# Patient Record
Sex: FEMALE | Race: BLACK OR AFRICAN AMERICAN | NOT HISPANIC OR LATINO | Employment: FULL TIME | URBAN - METROPOLITAN AREA
[De-identification: names, ages, dates, MRNs, and addresses within clinical notes are randomized per-mention and may not be internally consistent; named-entity substitution may affect disease eponyms.]

---

## 2017-07-28 ENCOUNTER — EH NON-PROVIDER (OUTPATIENT)
Dept: OCCUPATIONAL MEDICINE | Facility: CLINIC | Age: 22
End: 2017-07-28

## 2017-07-28 ENCOUNTER — HOSPITAL ENCOUNTER (OUTPATIENT)
Facility: MEDICAL CENTER | Age: 22
End: 2017-07-28
Attending: PREVENTIVE MEDICINE
Payer: COMMERCIAL

## 2017-07-28 ENCOUNTER — EMPLOYEE HEALTH (OUTPATIENT)
Dept: OCCUPATIONAL MEDICINE | Facility: CLINIC | Age: 22
End: 2017-07-28

## 2017-07-28 VITALS
WEIGHT: 127 LBS | BODY MASS INDEX: 22.5 KG/M2 | DIASTOLIC BLOOD PRESSURE: 60 MMHG | SYSTOLIC BLOOD PRESSURE: 100 MMHG | HEIGHT: 63 IN | OXYGEN SATURATION: 90 % | TEMPERATURE: 98.8 F | HEART RATE: 90 BPM

## 2017-07-28 DIAGNOSIS — Z02.1 PRE-EMPLOYMENT HEALTH SCREENING EXAMINATION: ICD-10-CM

## 2017-07-28 DIAGNOSIS — Z02.1 PRE-EMPLOYMENT DRUG SCREENING: ICD-10-CM

## 2017-07-28 LAB
AMP AMPHETAMINE: NORMAL
BAR BARBITURATES: NORMAL
BZO BENZODIAZEPINES: NORMAL
COC COCAINE: NORMAL
INT CON NEG: NORMAL
INT CON POS: NORMAL
MDMA ECSTASY: NORMAL
MET METHAMPHETAMINES: NORMAL
MTD METHADONE: NORMAL
OPI OPIATES: NORMAL
OXY OXYCODONE: NORMAL
PCP PHENCYCLIDINE: NORMAL
POC URINE DRUG SCREEN OCDRS: NORMAL
THC: NORMAL

## 2017-07-28 PROCEDURE — 80305 DRUG TEST PRSMV DIR OPT OBS: CPT | Performed by: PREVENTIVE MEDICINE

## 2017-07-28 PROCEDURE — 8915 PR COMPREHENSIVE PHYSICAL: Performed by: PREVENTIVE MEDICINE

## 2017-07-28 PROCEDURE — 86480 TB TEST CELL IMMUN MEASURE: CPT | Performed by: PREVENTIVE MEDICINE

## 2017-07-28 NOTE — NON-PROVIDER
[employer] New Employee  1. Drug Screen   2. Immunizations  - Quantiferon labs    - Varicella  docs  - MMR  docs  - Hep B  docs  - Tdap  docs   - Flu   Na   3. Mask Fit     na  4. Physical Clearance

## 2017-07-28 NOTE — MR AVS SNAPSHOT
Alicia Meadesey   2017 4:00 PM   Employee Health   MRN: [id number]    Department:  [facility]   Dept Phone:  [phone]    Description:  Female : 1995   Provider:  Mundo Baird M.D.           Reason for Visit     Other physical Procedure Room1      Allergies as of 2017     Not on File      You were diagnosed with     Pre-employment health screening examination   [550988]         Basic Information     Date Of Birth Sex Race Ethnicity Preferred Language    1995 Female Unknown Unknown English      Health Maintenance     Patient has no pending health maintenance at this time      Current Immunizations     No immunizations on file.      Below and/or attached are the medications your provider expects you to take. Review all of your home medications and newly ordered medications with your provider and/or pharmacist. Follow medication instructions as directed by your provider and/or pharmacist. Please keep your medication list with you and share with your provider. Update the information when medications are discontinued, doses are changed, or new medications (including over-the-counter products) are added; and carry medication information at all times in the event of emergency situations     Allergies:  No Known Allergies          Medications  Valid as of: 2017 -  6:18 PM    Generic Name Brand Name Tablet Size Instructions for use    .                 Medicines prescribed today were sent to:     None      Medication refill instructions:       If your prescription bottle indicates you have medication refills left, it is not necessary to call your provider’s office. Please contact your pharmacy and they will refill your medication.    If your prescription bottle indicates you do not have any refills left, you may request refills at any time through one of the following ways: The online OR Productivity system (except Urgent Care), by calling your provider’s office, or by asking  your pharmacy to contact your provider’s office with a refill request. Medication refills are processed only during regular business hours and may not be available until the next business day. Your provider may request additional information or to have a follow-up visit with you prior to refilling your medication.   *Please Note: Medication refills are assigned a new Rx number when refilled electronically. Your pharmacy may indicate that no refills were authorized even though a new prescription for the same medication is available at the pharmacy. Please request the medicine by name with the pharmacy before contacting your provider for a refill.           PWA Access Code: [email or web address]  Expires: 8/27/2017  5:52 PM    PWA  A secure, online tool to manage your health information     [employer]’s PWA® is a secure, online tool that connects you to your personalized health information from the privacy of your home -- day or night - making it very easy for you to manage your healthcare. Once the activation process is completed, you can even access your medical information using the PWA carlos, which is available for free in the Apple Carlos store or Google Play store.     PWA provides the following levels of access (as shown below):   My Chart Features   [facility] Primary [facility] Doctor [facility]  Specialists [facility]  Urgent  Care Non-[facility]  Primary Care  Doctor   Email your healthcare team securely and privately 24/7 X X X    Manage appointments: schedule your next appointment; view details of past/upcoming appointments X      Request prescription refills. X      View recent personal medical records, including lab and immunizations X X X X   View health record, including health history, allergies, medications X X X X   Read reports about your outpatient visits, procedures, consult and ER notes X X X X   See your discharge summary, which is a recap of your hospital and/or ER visit that includes your diagnosis,  lab results, and care plan. X X       How to register for Microtask:  1. Go to  [email or web address].  2. Click on the Sign Up Now box, which takes you to the New Member Sign Up page. You will need to provide the following information:  a. Enter your Microtask Access Code exactly as it appears at the top of this page. (You will not need to use this code after you’ve completed the sign-up process. If you do not sign up before the expiration date, you must request a new code.)   b. Enter your date of birth.   c. Enter your home email address.   d. Click Submit, and follow the next screen’s instructions.  3. Create a Volteat ID. This will be your Volteat login ID and cannot be changed, so think of one that is secure and easy to remember.  4. Create a Volteat password. You can change your password at any time.  5. Enter your Password Reset Question and Answer. This can be used at a later time if you forget your password.   6. Enter your e-mail address. This allows you to receive e-mail notifications when new information is available in Microtask.  7. Click Sign Up. You can now view your health information.    For assistance activating your Microtask account, call [phone]

## 2017-07-28 NOTE — MR AVS SNAPSHOT
"Alicia Tejada   2017 3:20 PM    Non-Provider   MRN: [id number]    Department:  [facility]   Dept Phone:  [phone]    Description:  Female : 1995   Provider:  [facility] NESS DANIEL RPRASANNA           Reason for Visit     Other [facility] new employee ds, blood work, vaccine      Allergies as of 2017     Not on File      You were diagnosed with     Pre-employment drug screening   [259794]       Pre-employment health screening examination   [919425]         Vital Signs     Blood Pressure Pulse Temperature Height Weight Body Mass Index    100/60 mmHg 90 37.1 °C (98.8 °F) 1.6 m (5' 2.99\") 57.607 kg (127 lb) 22.50 kg/m2    Oxygen Saturation                   90%           Basic Information     Date Of Birth Sex Race Ethnicity Preferred Language    1995 Female Unknown Unknown English      Health Maintenance     Patient has no pending health maintenance at this time      Results     POCT 11 Panel Urine Drug Screen      Component    AMPHETAMINE    COCAINE    POC THC    METHAMPHETAMINES    OPIATES    PHENCYCLIDINE    BENZODIAZIPINES    BARBITURATES    METHADONE    MDMA Ecstasy    OXYCODONE    Urine Drug Screen    neg    Internal Control Positive    Valid    Internal Control Negative    Valid                        Current Immunizations     No immunizations on file.      Below and/or attached are the medications your provider expects you to take. Review all of your home medications and newly ordered medications with your provider and/or pharmacist. Follow medication instructions as directed by your provider and/or pharmacist. Please keep your medication list with you and share with your provider. Update the information when medications are discontinued, doses are changed, or new medications (including over-the-counter products) are added; and carry medication information at all times in the event of emergency situations     Allergies:  No Known Allergies          Medications  Valid as of: July " 28, 2017 -  5:52 PM    Generic Name Brand Name Tablet Size Instructions for use    .                 Medicines prescribed today were sent to:     None      Medication refill instructions:       If your prescription bottle indicates you have medication refills left, it is not necessary to call your provider’s office. Please contact your pharmacy and they will refill your medication.    If your prescription bottle indicates you do not have any refills left, you may request refills at any time through one of the following ways: The online [employer] system (except Urgent Care), by calling your provider’s office, or by asking your pharmacy to contact your provider’s office with a refill request. Medication refills are processed only during regular business hours and may not be available until the next business day. Your provider may request additional information or to have a follow-up visit with you prior to refilling your medication.   *Please Note: Medication refills are assigned a new Rx number when refilled electronically. Your pharmacy may indicate that no refills were authorized even though a new prescription for the same medication is available at the pharmacy. Please request the medicine by name with the pharmacy before contacting your provider for a refill.        Your To Do List     Future Labs/Procedures Complete By Expires    QuantiFERON-TB Gold [TB TEST CELL IMM MEASURE AG]  As directed 7/28/2018         [employer] Access Code: [email or web address]  Expires: 8/27/2017  5:52 PM    Skybox Imaging  A secure, online tool to manage your health information     [employer]’s Skybox Imaging® is a secure, online tool that connects you to your personalized health information from the privacy of your home -- day or night - making it very easy for you to manage your healthcare. Once the activation process is completed, you can even access your medical information using the [employer] carlos, which is available for free in the Apple Carlos store or  Google Play store.     Advanced [employer] Technology provides the following levels of access (as shown below):   My Chart Features   [facility] Primary [facility] Doctor [facility]  Specialists [facility]  Urgent  Care Non-[facility]  Primary Care  Doctor   Email your healthcare team securely and privately 24/7 X X X    Manage appointments: schedule your next appointment; view details of past/upcoming appointments X      Request prescription refills. X      View recent personal medical records, including lab and immunizations X X X X   View health record, including health history, allergies, medications X X X X   Read reports about your outpatient visits, procedures, consult and ER notes X X X X   See your discharge summary, which is a recap of your hospital and/or ER visit that includes your diagnosis, lab results, and care plan. X X       How to register for Advanced Cell Technology:  1. Go to  [email or web address].  2. Click on the Sign Up Now box, which takes you to the New Member Sign Up page. You will need to provide the following information:  a. Enter your Advanced Cell Technology Access Code exactly as it appears at the top of this page. (You will not need to use this code after you’ve completed the sign-up process. If you do not sign up before the expiration date, you must request a new code.)   b. Enter your date of birth.   c. Enter your home email address.   d. Click Submit, and follow the next screen’s instructions.  3. Create a Advanced Cell Technology ID. This will be your Advanced Cell Technology login ID and cannot be changed, so think of one that is secure and easy to remember.  4. Create a Advanced Cell Technology password. You can change your password at any time.  5. Enter your Password Reset Question and Answer. This can be used at a later time if you forget your password.   6. Enter your e-mail address. This allows you to receive e-mail notifications when new information is available in Advanced Cell Technology.  7. Click Sign Up. You can now view your health information.    For assistance activating your Advanced [employer] Technology account, call  [phone]

## 2017-07-30 LAB
M TB TUBERC IFN-G BLD QL: NEGATIVE
M TB TUBERC IFN-G/MITOGEN IGNF BLD: 0
M TB TUBERC IGNF/MITOGEN IGNF CONTROL: 69.15 [IU]/ML
MITOGEN IGNF BCKGRD COR BLD-ACNC: 0.01 [IU]/ML

## 2017-10-07 ENCOUNTER — IMMUNIZATION (OUTPATIENT)
Dept: OCCUPATIONAL MEDICINE | Facility: CLINIC | Age: 22
End: 2017-10-07

## 2017-10-07 DIAGNOSIS — Z23 NEED FOR VACCINATION: ICD-10-CM

## 2017-10-07 PROCEDURE — 90686 IIV4 VACC NO PRSV 0.5 ML IM: CPT | Performed by: EMERGENCY MEDICINE

## 2018-02-13 ENCOUNTER — OFFICE VISIT (OUTPATIENT)
Dept: URGENT CARE | Facility: CLINIC | Age: 23
End: 2018-02-13
Payer: COMMERCIAL

## 2018-02-13 VITALS
TEMPERATURE: 99.1 F | OXYGEN SATURATION: 98 % | RESPIRATION RATE: 14 BRPM | BODY MASS INDEX: 20.91 KG/M2 | WEIGHT: 118 LBS | HEART RATE: 93 BPM | DIASTOLIC BLOOD PRESSURE: 84 MMHG | SYSTOLIC BLOOD PRESSURE: 102 MMHG | HEIGHT: 63 IN

## 2018-02-13 DIAGNOSIS — R09.81 SINUS CONGESTION: ICD-10-CM

## 2018-02-13 DIAGNOSIS — R05.9 COUGH: ICD-10-CM

## 2018-02-13 DIAGNOSIS — J02.9 PHARYNGITIS, UNSPECIFIED ETIOLOGY: ICD-10-CM

## 2018-02-13 LAB
INT CON NEG: NORMAL
INT CON POS: NORMAL
S PYO AG THROAT QL: NEGATIVE

## 2018-02-13 PROCEDURE — 99204 OFFICE O/P NEW MOD 45 MIN: CPT | Performed by: PHYSICIAN ASSISTANT

## 2018-02-13 PROCEDURE — 87880 STREP A ASSAY W/OPTIC: CPT | Performed by: PHYSICIAN ASSISTANT

## 2018-02-13 RX ORDER — AMOXICILLIN AND CLAVULANATE POTASSIUM 875; 125 MG/1; MG/1
1 TABLET, FILM COATED ORAL 2 TIMES DAILY
Qty: 20 TAB | Refills: 0 | Status: SHIPPED | OUTPATIENT
Start: 2018-02-13 | End: 2018-08-16

## 2018-02-14 ASSESSMENT — ENCOUNTER SYMPTOMS
SORE THROAT: 1
VOMITING: 0
SPUTUM PRODUCTION: 0
CHILLS: 0
ABDOMINAL PAIN: 0
SINUS PAIN: 0
WHEEZING: 0
COUGH: 1
DIARRHEA: 0
SHORTNESS OF BREATH: 0
NAUSEA: 0
FEVER: 0

## 2018-05-24 ENCOUNTER — OFFICE VISIT (OUTPATIENT)
Dept: URGENT CARE | Facility: CLINIC | Age: 23
End: 2018-05-24
Payer: COMMERCIAL

## 2018-05-24 ENCOUNTER — HOSPITAL ENCOUNTER (OUTPATIENT)
Facility: MEDICAL CENTER | Age: 23
End: 2018-05-24
Attending: PHYSICIAN ASSISTANT
Payer: COMMERCIAL

## 2018-05-24 VITALS
HEART RATE: 80 BPM | DIASTOLIC BLOOD PRESSURE: 72 MMHG | BODY MASS INDEX: 21.26 KG/M2 | WEIGHT: 120 LBS | TEMPERATURE: 98.3 F | RESPIRATION RATE: 16 BRPM | HEIGHT: 63 IN | OXYGEN SATURATION: 95 % | SYSTOLIC BLOOD PRESSURE: 120 MMHG

## 2018-05-24 DIAGNOSIS — N89.8 VAGINAL DISCHARGE: ICD-10-CM

## 2018-05-24 PROCEDURE — 87591 N.GONORRHOEAE DNA AMP PROB: CPT

## 2018-05-24 PROCEDURE — 87491 CHLMYD TRACH DNA AMP PROBE: CPT

## 2018-05-24 PROCEDURE — 87510 GARDNER VAG DNA DIR PROBE: CPT

## 2018-05-24 PROCEDURE — 87660 TRICHOMONAS VAGIN DIR PROBE: CPT

## 2018-05-24 PROCEDURE — 87480 CANDIDA DNA DIR PROBE: CPT

## 2018-05-24 PROCEDURE — 99214 OFFICE O/P EST MOD 30 MIN: CPT | Performed by: PHYSICIAN ASSISTANT

## 2018-05-24 RX ORDER — FLUCONAZOLE 150 MG/1
TABLET ORAL
Qty: 2 TAB | Refills: 0 | Status: SHIPPED | OUTPATIENT
Start: 2018-05-24 | End: 2018-08-16

## 2018-05-24 ASSESSMENT — ENCOUNTER SYMPTOMS
COUGH: 0
SHORTNESS OF BREATH: 0
PALPITATIONS: 0
FLANK PAIN: 0
BACK PAIN: 0
FEVER: 0
CHILLS: 0

## 2018-05-24 NOTE — PROGRESS NOTES
"Subjective:      Alicia Tejada is a 22 y.o. female who presents with Vaginal Discharge (vaginal discharge and itchiness x 4 days)            Vaginal Itching   This is a new problem. The current episode started in the past 7 days. The problem occurs constantly. The problem has been unchanged. Pertinent negatives include no chest pain, chills, coughing or fever. Associated symptoms comments: White discharge  . Nothing aggravates the symptoms. She has tried nothing for the symptoms.       Review of Systems   Constitutional: Negative for chills and fever.   Respiratory: Negative for cough and shortness of breath.    Cardiovascular: Negative for chest pain and palpitations.   Genitourinary: Negative for dysuria, flank pain, frequency, hematuria and urgency.        White discharge and itching from vagina   Musculoskeletal: Negative for back pain.   All other systems reviewed and are negative.    PMH:  has no past medical history on file.  MEDS:   Current Outpatient Prescriptions:   •  fluconazole (DIFLUCAN) 150 MG tablet, Take 1 tablet.  Repeat in 72 hours if symptoms do not resolve., Disp: 2 Tab, Rfl: 0  •  amoxicillin-clavulanate (AUGMENTIN) 875-125 MG Tab, Take 1 Tab by mouth 2 times a day. (Patient not taking: Reported on 5/24/2018), Disp: 20 Tab, Rfl: 0  ALLERGIES: No Known Allergies  SURGHX: History reviewed. No pertinent surgical history.  SOCHX:  reports that she has never smoked. She has never used smokeless tobacco.  FH: Family history was reviewed, no pertinent findings to report  Medications, Allergies, and current problem list reviewed today in Epic       Objective:     /72   Pulse 80   Temp 36.8 °C (98.3 °F)   Resp 16   Ht 1.6 m (5' 2.99\")   Wt 54.4 kg (120 lb)   SpO2 95%   BMI 21.26 kg/m²      Physical Exam   Constitutional: She is oriented to person, place, and time. She appears well-developed and well-nourished.   HENT:   Head: Normocephalic and atraumatic.   Right Ear: External ear normal. "   Left Ear: External ear normal.   Nose: Nose normal.   Mouth/Throat: Oropharynx is clear and moist.   Neck: Normal range of motion. Neck supple.   Cardiovascular: Normal rate, regular rhythm and normal heart sounds.    Pulmonary/Chest: Effort normal and breath sounds normal.   Abdominal: Soft.   Genitourinary: There is no tenderness on the right labia. There is no tenderness on the left labia. No erythema, tenderness or bleeding in the vagina. Vaginal discharge found.   Musculoskeletal: She exhibits no tenderness.   No CVA tenderness present.   Neurological: She is alert and oriented to person, place, and time.   Skin: Skin is warm and dry.   Psychiatric: She has a normal mood and affect. Her behavior is normal. Judgment and thought content normal.   Vitals reviewed.              Assessment/Plan:   Probable yeast infection.  Cultures taken during pelvic exam with MA chaperone.  Further treatment pending labs.    1. Vaginal discharge    - CHLAMYDIA/GC PCR URINE OR SWAB; Future  - VAGINAL PATHOGENS DNA PANEL; Future  - fluconazole (DIFLUCAN) 150 MG tablet; Take 1 tablet.  Repeat in 72 hours if symptoms do not resolve.  Dispense: 2 Tab; Refill: 0    Differential diagnosis, natural history, supportive care discussed. Follow-up with primary care provider within 7-10 days, emergency room precautions discussed.  Patient and/or family appears understanding of information.

## 2018-05-25 LAB
C TRACH DNA SPEC QL NAA+PROBE: NEGATIVE
CANDIDA DNA VAG QL PROBE+SIG AMP: POSITIVE
G VAGINALIS DNA VAG QL PROBE+SIG AMP: NEGATIVE
N GONORRHOEA DNA SPEC QL NAA+PROBE: NEGATIVE
SPECIMEN SOURCE: NORMAL
T VAGINALIS DNA VAG QL PROBE+SIG AMP: NEGATIVE

## 2018-07-26 ENCOUNTER — APPOINTMENT (OUTPATIENT)
Dept: MEDICAL GROUP | Facility: MEDICAL CENTER | Age: 23
End: 2018-07-26
Payer: COMMERCIAL

## 2018-07-30 ENCOUNTER — HOSPITAL ENCOUNTER (OUTPATIENT)
Dept: LAB | Facility: MEDICAL CENTER | Age: 23
End: 2018-07-30
Payer: COMMERCIAL

## 2018-07-30 LAB
BDY FAT % MEASURED: 24.4 %
BP DIAS: 58 MMHG
BP SYS: 107 MMHG
CHOLEST SERPL-MCNC: 147 MG/DL (ref 100–199)
DIABETES HTDIA: NO
EVENT NAME HTEVT: NORMAL
FASTING HTFAS: YES
GLUCOSE SERPL-MCNC: 118 MG/DL (ref 65–99)
HDLC SERPL-MCNC: 44 MG/DL
HYPERTENSION HTHYP: NO
LDLC SERPL CALC-MCNC: 85 MG/DL
SCREENING LOC CITY HTCIT: NORMAL
SCREENING LOC STATE HTSTA: NORMAL
SCREENING LOCATION HTLOC: NORMAL
SMOKING HTSMO: NO
SUBSCRIBER ID HTSID: NORMAL
TRIGL SERPL-MCNC: 90 MG/DL (ref 0–149)

## 2018-07-30 PROCEDURE — 82947 ASSAY GLUCOSE BLOOD QUANT: CPT

## 2018-07-30 PROCEDURE — 36415 COLL VENOUS BLD VENIPUNCTURE: CPT

## 2018-07-30 PROCEDURE — S5190 WELLNESS ASSESSMENT BY NONPH: HCPCS

## 2018-07-30 PROCEDURE — 80061 LIPID PANEL: CPT

## 2018-08-08 ENCOUNTER — APPOINTMENT (OUTPATIENT)
Dept: RADIOLOGY | Facility: IMAGING CENTER | Age: 23
End: 2018-08-08
Attending: PHYSICIAN ASSISTANT
Payer: COMMERCIAL

## 2018-08-08 ENCOUNTER — OFFICE VISIT (OUTPATIENT)
Dept: URGENT CARE | Facility: CLINIC | Age: 23
End: 2018-08-08
Payer: COMMERCIAL

## 2018-08-08 VITALS
HEIGHT: 64 IN | BODY MASS INDEX: 21.45 KG/M2 | WEIGHT: 125.66 LBS | HEART RATE: 78 BPM | DIASTOLIC BLOOD PRESSURE: 50 MMHG | SYSTOLIC BLOOD PRESSURE: 100 MMHG | TEMPERATURE: 98.8 F | OXYGEN SATURATION: 87 % | RESPIRATION RATE: 14 BRPM

## 2018-08-08 DIAGNOSIS — R10.13 EPIGASTRIC PAIN: ICD-10-CM

## 2018-08-08 LAB
APPEARANCE UR: NORMAL
BILIRUB UR STRIP-MCNC: NEGATIVE MG/DL
COLOR UR AUTO: NORMAL
GLUCOSE UR STRIP.AUTO-MCNC: NEGATIVE MG/DL
KETONES UR STRIP.AUTO-MCNC: NEGATIVE MG/DL
LEUKOCYTE ESTERASE UR QL STRIP.AUTO: NEGATIVE
NITRITE UR QL STRIP.AUTO: NEGATIVE
PH UR STRIP.AUTO: 7 [PH] (ref 5–8)
PROT UR QL STRIP: NEGATIVE MG/DL
RBC UR QL AUTO: NORMAL
SP GR UR STRIP.AUTO: 1.01
UROBILINOGEN UR STRIP-MCNC: 4 MG/DL

## 2018-08-08 PROCEDURE — 99214 OFFICE O/P EST MOD 30 MIN: CPT | Performed by: PHYSICIAN ASSISTANT

## 2018-08-08 PROCEDURE — 81002 URINALYSIS NONAUTO W/O SCOPE: CPT | Performed by: PHYSICIAN ASSISTANT

## 2018-08-08 PROCEDURE — 74019 RADEX ABDOMEN 2 VIEWS: CPT | Mod: TC | Performed by: PHYSICIAN ASSISTANT

## 2018-08-08 ASSESSMENT — ENCOUNTER SYMPTOMS
VOMITING: 0
FEVER: 0
CONSTITUTIONAL NEGATIVE: 1
MUSCULOSKELETAL NEGATIVE: 1
ABDOMINAL PAIN: 1
NAUSEA: 0

## 2018-08-09 ENCOUNTER — TELEPHONE (OUTPATIENT)
Dept: URGENT CARE | Facility: CLINIC | Age: 23
End: 2018-08-09

## 2018-08-09 ENCOUNTER — HOSPITAL ENCOUNTER (OUTPATIENT)
Dept: RADIOLOGY | Facility: MEDICAL CENTER | Age: 23
End: 2018-08-09
Attending: PHYSICIAN ASSISTANT
Payer: COMMERCIAL

## 2018-08-09 ENCOUNTER — HOSPITAL ENCOUNTER (OUTPATIENT)
Dept: LAB | Facility: MEDICAL CENTER | Age: 23
End: 2018-08-09
Attending: PHYSICIAN ASSISTANT
Payer: COMMERCIAL

## 2018-08-09 DIAGNOSIS — R10.13 EPIGASTRIC PAIN: ICD-10-CM

## 2018-08-09 DIAGNOSIS — K82.8 PORCELAIN GALLBLADDER: ICD-10-CM

## 2018-08-09 DIAGNOSIS — K80.20 GALLSTONES: ICD-10-CM

## 2018-08-09 LAB
ALBUMIN SERPL BCP-MCNC: 4.7 G/DL (ref 3.2–4.9)
ALBUMIN/GLOB SERPL: 1.7 G/DL
ALP SERPL-CCNC: 45 U/L (ref 30–99)
ALT SERPL-CCNC: 12 U/L (ref 2–50)
ANION GAP SERPL CALC-SCNC: 8 MMOL/L (ref 0–11.9)
ANISOCYTOSIS BLD QL SMEAR: ABNORMAL
AST SERPL-CCNC: 40 U/L (ref 12–45)
BASOPHILS # BLD AUTO: 0.4 % (ref 0–1.8)
BASOPHILS # BLD: 0.04 K/UL (ref 0–0.12)
BILIRUB SERPL-MCNC: 4.2 MG/DL (ref 0.1–1.5)
BUN SERPL-MCNC: 6 MG/DL (ref 8–22)
CALCIUM SERPL-MCNC: 9.3 MG/DL (ref 8.5–10.5)
CHLORIDE SERPL-SCNC: 106 MMOL/L (ref 96–112)
CO2 SERPL-SCNC: 25 MMOL/L (ref 20–33)
COMMENT 1642: NORMAL
CREAT SERPL-MCNC: 0.51 MG/DL (ref 0.5–1.4)
EOSINOPHIL # BLD AUTO: 0.18 K/UL (ref 0–0.51)
EOSINOPHIL NFR BLD: 1.6 % (ref 0–6.9)
ERYTHROCYTE [DISTWIDTH] IN BLOOD BY AUTOMATED COUNT: 83.6 FL (ref 35.9–50)
GLOBULIN SER CALC-MCNC: 2.7 G/DL (ref 1.9–3.5)
GLUCOSE SERPL-MCNC: 82 MG/DL (ref 65–99)
HCT VFR BLD AUTO: 20.1 % (ref 37–47)
HGB BLD-MCNC: 7 G/DL (ref 12–16)
IMM GRANULOCYTES # BLD AUTO: 0.05 K/UL (ref 0–0.11)
IMM GRANULOCYTES NFR BLD AUTO: 0.5 % (ref 0–0.9)
LIPASE SERPL-CCNC: 19 U/L (ref 11–82)
LYMPHOCYTES # BLD AUTO: 2.9 K/UL (ref 1–4.8)
LYMPHOCYTES NFR BLD: 26.6 % (ref 22–41)
MACROCYTES BLD QL SMEAR: ABNORMAL
MCH RBC QN AUTO: 30.8 PG (ref 27–33)
MCHC RBC AUTO-ENTMCNC: 34.8 G/DL (ref 33.6–35)
MCV RBC AUTO: 88.5 FL (ref 81.4–97.8)
MONOCYTES # BLD AUTO: 0.7 K/UL (ref 0–0.85)
MONOCYTES NFR BLD AUTO: 6.4 % (ref 0–13.4)
MORPHOLOGY BLD-IMP: NORMAL
NEUTROPHILS # BLD AUTO: 7.05 K/UL (ref 2–7.15)
NEUTROPHILS NFR BLD: 64.5 % (ref 44–72)
NRBC # BLD AUTO: 0.55 K/UL
NRBC BLD-RTO: 5 /100 WBC
PATH REV: NORMAL
PATH REV: NORMAL
PLATELET # BLD AUTO: 546 K/UL (ref 164–446)
PLATELET BLD QL SMEAR: NORMAL
PMV BLD AUTO: 10.4 FL (ref 9–12.9)
POIKILOCYTOSIS BLD QL SMEAR: NORMAL
POLYCHROMASIA BLD QL SMEAR: NORMAL
POTASSIUM SERPL-SCNC: 4 MMOL/L (ref 3.6–5.5)
PROT SERPL-MCNC: 7.4 G/DL (ref 6–8.2)
RBC # BLD AUTO: 2.27 M/UL (ref 4.2–5.4)
RBC BLD AUTO: PRESENT
SCHISTOCYTES BLD QL SMEAR: NORMAL
SICKLE CELLS BLD QL SMEAR: NORMAL
SODIUM SERPL-SCNC: 139 MMOL/L (ref 135–145)
TARGETS BLD QL SMEAR: NORMAL
WBC # BLD AUTO: 10.9 K/UL (ref 4.8–10.8)

## 2018-08-09 PROCEDURE — 80053 COMPREHEN METABOLIC PANEL: CPT

## 2018-08-09 PROCEDURE — 83690 ASSAY OF LIPASE: CPT

## 2018-08-09 PROCEDURE — 36415 COLL VENOUS BLD VENIPUNCTURE: CPT

## 2018-08-09 PROCEDURE — 80500 HCHG CLINICAL PATH CONSULT-LIMITED: CPT

## 2018-08-09 PROCEDURE — 76705 ECHO EXAM OF ABDOMEN: CPT

## 2018-08-09 PROCEDURE — 85025 COMPLETE CBC W/AUTO DIFF WBC: CPT

## 2018-08-09 NOTE — TELEPHONE ENCOUNTER
Called pt back again after seeing CBC; pt has profound anemia [hx same, hx xfusion...]; told pt to go to ER tonight for more eval/tx; prob has surgical gallbladder, as well. rw

## 2018-08-09 NOTE — TELEPHONE ENCOUNTER
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 rw

## 2018-08-09 NOTE — PROGRESS NOTES
"Subjective:      Alicia Tejada is a 23 y.o. female who presents with Abdominal Pain ([id number], stomach pain, bloated, back pain in morning, unable to sleep well, constipation)            Abdominal Pain   This is a new problem. The current episode started yesterday. The onset quality is undetermined. The problem occurs constantly. The problem has been unchanged. The pain is located in the epigastric region. The pain is moderate. The quality of the pain is aching. The abdominal pain radiates to the back. Pertinent negatives include no dysuria, fever, melena, nausea or vomiting. Nothing aggravates the pain. The pain is relieved by nothing. She has tried nothing for the symptoms. The treatment provided no relief. There is no history of GERD.       Review of Systems   Constitutional: Negative.  Negative for fever.   HENT: Negative.    Gastrointestinal: Positive for abdominal pain. Negative for melena, nausea and vomiting.   Genitourinary: Negative.  Negative for dysuria.   Musculoskeletal: Negative.    Skin: Negative.           Objective:     /50   Pulse 78   Temp 37.1 °C (98.8 °F)   Resp 14   Ht 1.626 m (5' 4\")   Wt 57 kg (125 lb 10.6 oz)   SpO2 (!) 87%   BMI 21.57 kg/m²      Physical Exam   Constitutional: She is oriented to person, place, and time. She appears well-developed and well-nourished. No distress.   HENT:   Head: Normocephalic and atraumatic.   Mouth/Throat: Oropharynx is clear and moist.   Abdominal: Soft. Bowel sounds are normal. She exhibits no distension and no mass. There is tenderness. There is no guarding.   Neurological: She is alert and oriented to person, place, and time.   Skin: Skin is warm and dry. Capillary refill takes less than 2 seconds.   Psychiatric: She has a normal mood and affect. Her behavior is normal.   Nursing note and vitals reviewed.    Active Ambulatory Problems     Diagnosis Date Noted   • No Active Ambulatory Problems     Resolved Ambulatory Problems     Diagnosis " Date Noted   • No Resolved Ambulatory Problems     No Additional Past Medical History     Current Outpatient Prescriptions on File Prior to Visit   Medication Sig Dispense Refill   • fluconazole (DIFLUCAN) 150 MG tablet Take 1 tablet.  Repeat in 72 hours if symptoms do not resolve. 2 Tab 0   • amoxicillin-clavulanate (AUGMENTIN) 875-125 MG Tab Take 1 Tab by mouth 2 times a day. (Patient not taking: Reported on 5/24/2018) 20 Tab 0     No current facility-administered medications on file prior to visit.      Social History     Social History   • Marital status: Unknown     Spouse name: N/A   • Number of children: N/A   • Years of education: N/A     Occupational History   • Not on file.     Social History Main Topics   • Smoking status: Never Smoker   • Smokeless tobacco: Never Used   • Alcohol use Not on file   • Drug use: Unknown   • Sexual activity: Not on file     Other Topics Concern   • Not on file     Social History Narrative   • No narrative on file     History reviewed. No pertinent family history.  Patient has no known allergies.       xr ng  (read/interpret. By me. Rw)         Assessment/Plan:     ·  epig pn; bloating, constip      · Labs; miralax

## 2018-08-15 ENCOUNTER — TELEPHONE (OUTPATIENT)
Dept: MEDICAL GROUP | Facility: PHYSICIAN GROUP | Age: 23
End: 2018-08-15

## 2018-08-15 NOTE — TELEPHONE ENCOUNTER
ESTABLISHED PATIENT PRE-VISIT PLANNING     Note: Patient will not be contacted if there is no indication to call.     1.  Reviewed notes from the last few office visits within the medical group: Yes    2.  If any orders were placed at last visit or intended to be done for this visit (i.e. 6 mos follow-up), do we have Results/Consult Notes?        •  Labs - Labs were not ordered at last office visit.   Note: If patient appointment is for lab review and patient did not complete labs, check with provider if OK to reschedule patient until labs completed.       •  Imaging - Imaging was not ordered at last office visit.       •  Referrals - No referrals were ordered at last office visit.    3. Is this appointment scheduled as a Hospital Follow-Up? No    4.  Immunizations were updated in Epic using WebIZ?: Epic matches WebIZ       •  Web Iz Recommendations: FLU, HPV and TD    5.  Patient is due for the following Health Maintenance Topics:   Health Maintenance Due   Topic Date Due   • IMM MENINGOCOCCAL B VACCINE (1 of 3 - Trumenba 3-Dose Series ) 06/22/2005   • IMM HPV VACCINE (1 of 3 - Female 3-dose series) 06/22/2006   • PAP SMEAR  06/22/2016       - Patient has completed FLU, HEPATITIS A  and HEPATITIS B Immunization(s) per WebIZ. Chart has been updated.    6.  MDX printed for Provider? NO    7.  Patient was NOT informed to arrive 15 min prior to their scheduled appointment and bring in their medication bottles.

## 2018-08-16 ENCOUNTER — OFFICE VISIT (OUTPATIENT)
Dept: MEDICAL GROUP | Facility: PHYSICIAN GROUP | Age: 23
End: 2018-08-16
Payer: COMMERCIAL

## 2018-08-16 VITALS
TEMPERATURE: 98.7 F | RESPIRATION RATE: 16 BRPM | HEIGHT: 64 IN | OXYGEN SATURATION: 97 % | WEIGHT: 120.4 LBS | SYSTOLIC BLOOD PRESSURE: 98 MMHG | HEART RATE: 68 BPM | DIASTOLIC BLOOD PRESSURE: 68 MMHG | BODY MASS INDEX: 20.55 KG/M2

## 2018-08-16 DIAGNOSIS — D57.1 HB-SS DISEASE WITHOUT CRISIS (HCC): ICD-10-CM

## 2018-08-16 DIAGNOSIS — Z30.09 COUNSELING FOR BIRTH CONTROL, ORAL CONTRACEPTIVES: ICD-10-CM

## 2018-08-16 DIAGNOSIS — F41.9 ANXIETY: ICD-10-CM

## 2018-08-16 DIAGNOSIS — K80.20 GALLSTONES: ICD-10-CM

## 2018-08-16 PROBLEM — Z30.011 ENCOUNTER FOR INITIAL PRESCRIPTION OF CONTRACEPTIVE PILLS: Status: ACTIVE | Noted: 2018-08-16

## 2018-08-16 PROCEDURE — 99214 OFFICE O/P EST MOD 30 MIN: CPT | Performed by: PHYSICIAN ASSISTANT

## 2018-08-16 RX ORDER — CITALOPRAM HYDROBROMIDE 10 MG/1
10 TABLET ORAL DAILY
Qty: 30 TAB | Refills: 2 | Status: SHIPPED | OUTPATIENT
Start: 2018-08-16 | End: 2018-09-24

## 2018-08-16 ASSESSMENT — PATIENT HEALTH QUESTIONNAIRE - PHQ9: CLINICAL INTERPRETATION OF PHQ2 SCORE: 0

## 2018-08-16 ASSESSMENT — PAIN SCALES - GENERAL: PAINLEVEL: NO PAIN

## 2018-08-16 NOTE — ASSESSMENT & PLAN NOTE
Chronic but stable problem.  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  She tells me that as a child she took penicillin and folic acid.  States she discontinued medications on her own.  She tells me that she follows up with a hematologist in the [place] area annually.  Patient would like to establish care with hematologist at [facility].  States she works on her diet, exercise and stays hydrated.  She mentions that she experienced a sickle cell crisis in 01/14 and was hospitalized at [facility] in [place] for 1 week.  She tells me that she had a blood transfusion at 6-7 years old.  Asymptomatic at this time.  States mother and father have sickle cell trait.

## 2018-08-16 NOTE — ASSESSMENT & PLAN NOTE
Patient would like to discuss birth control methods.  She tells me that one year ago she stopped taking oral contraceptive Sprintec, but had taken medication for 3 years.  She tells me that she is noncompliant with oral contraceptive.  States she was placed on oral contraceptives for contraceptive reasons and for long menstrual periods.  Denies being sexually active at this time.  States menstrual cycles are regular and menses for 7 days in duration.  Experiences heavy bleeding on days 1-3 in moderate to light bleeding on the remainder days.  She tells me that she experiences moderate to severe cramping and takes over-the-counter ibuprofen or Advil with no alleviation symptoms.  Patient has a positive medical history for sickle cell anemia.  Denies personal or family history of DVT/PEs.  Admits to regular exercise routine.  Denies smoking.

## 2018-08-16 NOTE — PROGRESS NOTES
Chief Complaint   Patient presents with   • Establish Care     anxiety, and pt has gallstones        HISTORY OF PRESENT ILLNESS: Alicia Tejada is an established 23 y.o. female here to discuss the evaluation and management of:    Counseling for birth control, oral contraceptives  Patient would like to discuss birth control methods.  She tells me that one year ago she stopped taking oral contraceptive Sprintec, but had taken medication for 3 years.  She tells me that she is noncompliant with oral contraceptive.  States she was placed on oral contraceptives for contraceptive reasons and for long menstrual periods.  Denies being sexually active at this time.  States menstrual cycles are regular and menses for 7 days in duration.  Experiences heavy bleeding on days 1-3 in moderate to light bleeding on the remainder days.  She tells me that she experiences moderate to severe cramping and takes over-the-counter ibuprofen or Advil with no alleviation symptoms.  Patient has a positive medical history for sickle cell anemia.  Denies personal or family history of DVT/PEs.  Admits to regular exercise routine.  Denies smoking.      Gallstones  Patient was seen at urgent care on 08/08/2018 for abdominal pain, bloating and constipation.  She tells me that she is diagnosed with gallstones.  States she is still experiencing abdominal fullness, bloating and constipation.  She tells me that she followed up with [facility] and was advised to take over-the-counter MiraLAX and digestive advantage for 2 weeks.  If symptoms do not improve that more than likely she will have to proceed with a cholecystectomy.  Denies nausea, vomiting, fever, chills, abdominal pain.      Anxiety  Chronic but worsening problem.  Patient states she has been experiencing anxiety for 4 years.  States anxiety symptoms developed when she started college.  She tells me on average she will experience one panic attack per month.  Panic attack  symptoms include tachycardia, diaphoresis and rapid breathing.  States during panic attacks she uses coping mechanisms.  States symptoms lasted 10-15 minutes in duration.  She mentions that occasionally she has difficulty falling asleep due to experiencing racing thoughts.  States she has wakes up often.  She admits to healthy diet and regular exercise routine.  Denies homicidal or suicidal ideation.      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  She tells me that as a child she took penicillin and folic acid.  States she discontinued medications on her own.  She tells me that she follows up with a hematologist in the [place] area annually.  Patient would like to establish care with hematologist at [facility].  States she works on her diet, exercise and stays hydrated.  She mentions that she experienced a sickle cell crisis in  and was hospitalized at [facility] in [place] for 1 week.  She tells me that she had a blood transfusion at 6-7 years old.  Asymptomatic at this time.  States mother and father have sickle cell trait.      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    Current Outpatient Prescriptions   Medication Sig Dispense Refill   • citalopram (CELEXA) 10 MG tablet Take 1 Tab by mouth every day. 30 Tab 2     No current facility-administered medications for this visit.        Social History   Substance Use Topics   • Smoking status: Never Smoker   • Smokeless tobacco: Never Used   • Alcohol use Yes      Comment: OCC. No-Binge Drinking.        Family Status   Relation Status   • Mo Alive   • Fa Alive   • Bro Alive   • MGMo    • MGFa    • PGMo Alive   • PGFa Alive   • Bro Alive   • Bro Alive  "  • MUnc Alive     Family History   Problem Relation Age of Onset   • No Known Problems Mother    • No Known Problems Father    • No Known Problems Brother    • Diabetes Maternal Grandmother         Type II   • Hypertension Maternal Grandmother    • Diabetes Paternal Grandmother         Type II   • Hypertension Paternal Grandmother    • No Known Problems Brother    • GI Brother         GI Ulcers   • Other Maternal Uncle         Sickle Cell        ROS:  Review of Systems   Constitutional: Negative for fever, chills, weight loss and malaise/fatigue.   HENT: Negative for ear pain, nosebleeds, congestion, sore throat and neck pain.    Eyes: Negative for blurred vision.   Respiratory: Negative for cough, sputum production, shortness of breath and wheezing.    Cardiovascular: Negative for chest pain, palpitations, orthopnea and leg swelling.   Gastrointestinal: Negative for heartburn, nausea, vomiting and abdominal pain.   Genitourinary: Negative for dysuria, urgency and frequency.   Musculoskeletal: Negative for myalgias, back pain and joint pain.   Skin: Negative for rash and itching.   Neurological: Negative for dizziness, tingling, tremors, sensory change, focal weakness and headaches.   Endo/Heme/Allergies: Does not bruise/bleed easily.   Psychiatric/Behavioral: Negative for depression, suicidal ideas and memory loss.  The patient is nervous/anxious and does have insomnia.    All other systems reviewed and are negative except as in HPI.    Exam: Blood pressure (!) 98/68, pulse 68, temperature 37.1 °C (98.7 °F), resp. rate 16, height 1.626 m (5' 4\"), weight 54.6 kg (120 lb 6.4 oz), last menstrual period 08/01/2018, SpO2 97 %, not currently breastfeeding. Body mass index is 20.67 kg/m².  General: Normal appearing. No distress.  HEENT: Normocephalic. Eyes conjunctiva clear lids without ptosis, pupils equal and reactive to light accommodation, ears normal shape and contour, canals are clear bilaterally, tympanic " membranes are benign, nasal mucosa benign, oropharynx is without erythema, edema or exudates.   Neck: Supple without JVD or bruit. Thyroid is not enlarged.  Pulmonary: Clear to ausculation.  Normal effort. No rales, ronchi, or wheezing.  Cardiovascular: Regular rate and rhythm without murmur.   Abdomen: Soft, nontender, nondistended. Normal bowel sounds. Liver and spleen are not palpable  Neurologic: Grossly nonfocal.  Cranial nerves are normal. LE DTR's normal and symmetric.  Lymph: No cervical, supraclavicular or axillary lymph nodes are palpable  Skin: Warm and dry.  No rashes or suspicious skin lesions.  Musculoskeletal: Normal gait. No extremity cyanosis, clubbing, or edema.  Psych: Normal mood and affect. Alert and oriented x3. Judgment and insight is normal.    Medical decision-making and discussion:  1. Counseling for birth control, oral contraceptives  Discussed birth control methods at length.  Discussed risks associated with OCPs including: blood clot, heart attack, and stroke. Advised not to smoke while on hormonal birth control.  Patient is considering Depo-Provera.  At this time does not want to proceed with Depo-Provera injection.  Will discuss in more detail during follow-up appointment in 6 weeks.      2. Gallstones  Continue following up with gastroenterology as indicated.  Continue current medication regimen.  Advised patient to increase fiber intake and continue working on hydration.  Suggested over-the-counter smooth move tea or prune juice.     Advised patient if she develops fever, chills, increased abdominal pain to seek immediate emergency care.    3. Anxiety  Patient is drug naïve.  During today's appointment patient has been prescribed Celexa 10 mg tablet and advised take 1 tablet by mouth every day.  Discussed common side effects and adverse reactions of medication with patient.  Advised patient that she may experience side effects were 2+ weeks but side effect symptoms should subside and  she should start feeling the benefits of the medication around 4-6 weeks.  Advised patient to continue using coping mechanisms.  Discussed with patient that dosage of medication may need to be increased to reach therapeutic range.     Denies any suicidal or homicidal ideation. Emphasized importance of healthy diet and exercise. Discussed that should the patient have any symptoms they should call suicide prevention hotline or report to the emergency room immediately.      - citalopram (CELEXA) 10 MG tablet; Take 1 Tab by mouth every day.  Dispense: 30 Tab; Refill: 2    4. 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  Advised patient to continue work on diet, exercise and hydration.    - REFERRAL TO HEMATOLOGY ONCOLOGY Referral to? [facility]      Please note that this dictation was created using voice recognition software. I have made every reasonable attempt to correct obvious errors, but I expect that there are errors of grammar and possibly content that I did not discover before finalizing the note.      Return in about 6 weeks (around 9/27/2018).

## 2018-08-16 NOTE — ASSESSMENT & PLAN NOTE
Patient was seen at urgent care on 08/08/2018 for abdominal pain, bloating and constipation.  She tells me that she is diagnosed with gallstones.  States she is still experiencing abdominal fullness, bloating and constipation.  She tells me that she followed up with [facility] and was advised to take over-the-counter MiraLAX and digestive advantage for 2 weeks.  If symptoms do not improve that more than likely she will have to proceed with a cholecystectomy.  Denies nausea, vomiting, fever, chills, abdominal pain.

## 2018-08-16 NOTE — ASSESSMENT & PLAN NOTE
Chronic but worsening problem.  Patient states she has been experiencing anxiety for 4 years.  States anxiety symptoms developed when she started college.  She tells me on average she will experience one panic attack per month.  Panic attack symptoms include tachycardia, diaphoresis and rapid breathing.  States during panic attacks she uses coping mechanisms.  States symptoms lasted 10-15 minutes in duration.  She mentions that occasionally she has difficulty falling asleep due to experiencing racing thoughts.  States she has wakes up often.  She admits to healthy diet and regular exercise routine.  Denies homicidal or suicidal ideation.

## 2018-08-21 ENCOUNTER — TELEPHONE (OUTPATIENT)
Dept: HEMATOLOGY ONCOLOGY | Facility: MEDICAL CENTER | Age: 23
End: 2018-08-21

## 2018-08-21 NOTE — TELEPHONE ENCOUNTER
1st attempt to contact the patient.  LM on voicemail for patient requesting a call back to schedule a new patient hematology appointment with Par[facility].  NP/HTH/ Hb-SS Meri w/o crisis/ Marlee Shearer

## 2018-09-13 ENCOUNTER — OFFICE VISIT (OUTPATIENT)
Dept: HEMATOLOGY ONCOLOGY | Facility: MEDICAL CENTER | Age: 23
End: 2018-09-13
Payer: COMMERCIAL

## 2018-09-13 ENCOUNTER — HOSPITAL ENCOUNTER (OUTPATIENT)
Dept: LAB | Facility: MEDICAL CENTER | Age: 23
End: 2018-09-13
Attending: INTERNAL MEDICINE
Payer: COMMERCIAL

## 2018-09-13 VITALS
BODY MASS INDEX: 20.32 KG/M2 | DIASTOLIC BLOOD PRESSURE: 66 MMHG | TEMPERATURE: 98.6 F | WEIGHT: 119.05 LBS | OXYGEN SATURATION: 89 % | RESPIRATION RATE: 16 BRPM | HEIGHT: 64 IN | SYSTOLIC BLOOD PRESSURE: 104 MMHG | HEART RATE: 85 BPM

## 2018-09-13 DIAGNOSIS — R79.89 ABNORMAL BILIRUBIN TEST: ICD-10-CM

## 2018-09-13 DIAGNOSIS — K80.20 GALLSTONES: ICD-10-CM

## 2018-09-13 DIAGNOSIS — D57.1 HB-SS DISEASE WITHOUT CRISIS (HCC): Primary | ICD-10-CM

## 2018-09-13 DIAGNOSIS — R17 TOTAL BILIRUBIN, ELEVATED: ICD-10-CM

## 2018-09-13 DIAGNOSIS — D57.1 HB-SS DISEASE WITHOUT CRISIS (HCC): ICD-10-CM

## 2018-09-13 LAB
ALBUMIN SERPL BCP-MCNC: 4.6 G/DL (ref 3.2–4.9)
ALP SERPL-CCNC: 50 U/L (ref 30–99)
ALT SERPL-CCNC: 15 U/L (ref 2–50)
ANISOCYTOSIS BLD QL SMEAR: ABNORMAL
AST SERPL-CCNC: 51 U/L (ref 12–45)
BASOPHILS # BLD AUTO: 2.5 % (ref 0–1.8)
BASOPHILS # BLD: 0.36 K/UL (ref 0–0.12)
BILIRUB CONJ SERPL-MCNC: 0.6 MG/DL (ref 0.1–0.5)
BILIRUB INDIRECT SERPL-MCNC: 2.9 MG/DL (ref 0–1)
BILIRUB SERPL-MCNC: 3.5 MG/DL (ref 0.1–1.5)
EOSINOPHIL # BLD AUTO: 0.36 K/UL (ref 0–0.51)
EOSINOPHIL NFR BLD: 2.5 % (ref 0–6.9)
ERYTHROCYTE [DISTWIDTH] IN BLOOD BY AUTOMATED COUNT: 76.8 FL (ref 35.9–50)
HCT VFR BLD AUTO: 20.6 % (ref 37–47)
HGB BLD-MCNC: 7.2 G/DL (ref 12–16)
HYPOCHROMIA BLD QL SMEAR: ABNORMAL
LYMPHOCYTES # BLD AUTO: 6.13 K/UL (ref 1–4.8)
LYMPHOCYTES NFR BLD: 42.9 % (ref 22–41)
MACROCYTES BLD QL SMEAR: ABNORMAL
MANUAL DIFF BLD: NORMAL
MCH RBC QN AUTO: 29.9 PG (ref 27–33)
MCHC RBC AUTO-ENTMCNC: 35 G/DL (ref 33.6–35)
MCV RBC AUTO: 85.5 FL (ref 81.4–97.8)
MICROCYTES BLD QL SMEAR: ABNORMAL
MONOCYTES # BLD AUTO: 0.49 K/UL (ref 0–0.85)
MONOCYTES NFR BLD AUTO: 3.4 % (ref 0–13.4)
MORPHOLOGY BLD-IMP: NORMAL
NEUTROPHILS # BLD AUTO: 6.85 K/UL (ref 2–7.15)
NEUTROPHILS NFR BLD: 47.9 % (ref 44–72)
NRBC # BLD AUTO: 0.14 K/UL
NRBC BLD-RTO: 1 /100 WBC
PLATELET # BLD AUTO: 539 K/UL (ref 164–446)
PLATELET BLD QL SMEAR: NORMAL
PMV BLD AUTO: 10.5 FL (ref 9–12.9)
POIKILOCYTOSIS BLD QL SMEAR: NORMAL
POLYCHROMASIA BLD QL SMEAR: NORMAL
PROT SERPL-MCNC: 8.1 G/DL (ref 6–8.2)
RBC # BLD AUTO: 2.41 M/UL (ref 4.2–5.4)
RBC BLD AUTO: PRESENT
SICKLE CELLS BLD QL SMEAR: NORMAL
WBC # BLD AUTO: 14.3 K/UL (ref 4.8–10.8)

## 2018-09-13 PROCEDURE — 85007 BL SMEAR W/DIFF WBC COUNT: CPT

## 2018-09-13 PROCEDURE — 80076 HEPATIC FUNCTION PANEL: CPT

## 2018-09-13 PROCEDURE — 99204 OFFICE O/P NEW MOD 45 MIN: CPT | Mod: Q6 | Performed by: INTERNAL MEDICINE

## 2018-09-13 PROCEDURE — 85027 COMPLETE CBC AUTOMATED: CPT

## 2018-09-13 PROCEDURE — 36415 COLL VENOUS BLD VENIPUNCTURE: CPT

## 2018-09-13 RX ORDER — CETIRIZINE HYDROCHLORIDE 10 MG/1
10 TABLET ORAL
COMMUNITY
End: 2020-10-21

## 2018-09-13 ASSESSMENT — ENCOUNTER SYMPTOMS
DIZZINESS: 0
NERVOUS/ANXIOUS: 0
HEADACHES: 0
HEMOPTYSIS: 0
NAUSEA: 0
FEVER: 0
PALPITATIONS: 0
VOMITING: 0
CONSTITUTIONAL NEGATIVE: 1
DEPRESSION: 0
CHILLS: 0
WHEEZING: 0
SINUS PAIN: 0
COUGH: 0
DOUBLE VISION: 0
WEIGHT LOSS: 0
ABDOMINAL PAIN: 0
MYALGIAS: 0
BLURRED VISION: 0

## 2018-09-13 ASSESSMENT — PAIN SCALES - GENERAL: PAINLEVEL: NO PAIN

## 2018-09-13 NOTE — PROGRESS NOTES
Consult Note: Hematology    Date of consultation: 9/13/2018 7:44 AM    Referring provider: Marlee Shearer P.A.-C.    Reason for consultation: Sickle cell Anemia -- Hgb - SS     History of presenting illness:  23 year old female , diagnosed with sickle cell anemia as an infant, presents today to establish care. She had been doing well , she received only 2 PRBC transfusions at 7 years of age and no need since then. One episode of pneumonia -in early childhood, and no other infections . One episode of sickle cell crisis -1/2014 , without recurrence .Presents with abdominal pain , RUQ pain late July early August 2018 .     US gallbladder: 8/9/18 : FINDINGS: There is no ascites. The liver is normal in contour. There is no evidence of solid mass lesion. The liver measures 18.22 cm. Gallbladder is contracted and demonstrates shadowing most likely due to multiple stones within the gallbladder. The gallbladder wall measures 0.24 cc.  There is no pericholecystic fluid.  The common duct measures 0.34 cm. The visualized pancreas is unremarkable. The visualized aorta is normal in caliber. Intrahepatic IVC is patent. The portal vein is patent with hepatopetal flow measuring 10 mm.  The right kidney measures 10.94 cm.  She is ongoing further GI evaluation at [facility].         Thank you very much for allowing me to see  Alicia Tejada today .     Past Medical History:  History reviewed. No pertinent past medical history.    Past surgical history:    Past Surgical History:   Procedure Laterality Date   • TONSILLECTOMY         Allergies:  No Known Allergies    Medications:    Current Outpatient Prescriptions   Medication Sig Dispense Refill   • cetirizine (ZYRTEC) 10 MG Tab Take 10 mg by mouth every day.     • Psyllium (METAMUCIL FIBER PO) Take  by mouth.     • citalopram (CELEXA) 10 MG tablet Take 1 Tab by mouth every day. 30 Tab 2     No current facility-administered medications for this visit.        Social  History:     Social History     Social History   • Marital status: Single     Spouse name: N/A   • Number of children: N/A   • Years of education: N/A     Occupational History   • Not on file.     Social History Main Topics   • Smoking status: Never Smoker   • Smokeless tobacco: Never Used   • Alcohol use Yes      Comment: OCC. No-Binge Drinking.    • Drug use: No   • Sexual activity: Not Currently     Partners: Male     Birth control/ protection: Condom     Other Topics Concern   • Not on file     Social History Narrative   • No narrative on file       Family History:     Family History   Problem Relation Age of Onset   • No Known Problems Mother    • No Known Problems Father    • No Known Problems Brother    • Diabetes Maternal Grandmother         Type II   • Hypertension Maternal Grandmother    • Diabetes Paternal Grandmother         Type II   • Hypertension Paternal Grandmother    • No Known Problems Brother    • GI Brother         GI Ulcers   • Other Maternal Uncle         Sickle Cell        Review of Systems   Constitutional: Negative.  Negative for chills, fever, malaise/fatigue and weight loss.   HENT: Negative for hearing loss, nosebleeds and sinus pain.    Eyes: Negative for blurred vision and double vision.   Respiratory: Negative for cough, hemoptysis and wheezing.    Cardiovascular: Negative for chest pain, palpitations and leg swelling.   Gastrointestinal: Negative for abdominal pain, nausea and vomiting.        Bloating off/on   Genitourinary: Negative for dysuria.   Musculoskeletal: Negative for joint pain and myalgias.   Skin: Negative for itching and rash.   Neurological: Negative for dizziness and headaches.   Psychiatric/Behavioral: Negative for depression. The patient is not nervous/anxious.        Physical Exam   Constitutional: She is oriented to person, place, and time and well-developed, well-nourished, and in no distress. No distress.   HENT:   Head: Normocephalic.   Mouth/Throat: No  "oropharyngeal exudate.   Eyes: Pupils are equal, round, and reactive to light. EOM are normal.   Neck: Normal range of motion. Neck supple. No tracheal deviation present. No thyromegaly present.   Cardiovascular: Normal rate, regular rhythm and normal heart sounds.    No murmur heard.  Pulmonary/Chest: Effort normal and breath sounds normal. No respiratory distress. She has no wheezes. She has no rales.   Abdominal: Soft. Bowel sounds are normal. She exhibits no distension and no mass. There is no tenderness. There is no rebound.   Musculoskeletal: She exhibits no edema.   Lymphadenopathy:     She has no cervical adenopathy.   Neurological: She is alert and oriented to person, place, and time.   Skin: Skin is warm and dry. No rash noted. She is not diaphoretic. No erythema.   Psychiatric: Mood and affect normal.       Vitals:   /66   Pulse 85   Temp 37 °C (98.6 °F)   Resp 16   Ht 1.626 m (5' 4\")   Wt 54 kg (119 lb 0.8 oz)   LMP 08/23/2018   SpO2 89%   Breastfeeding? No   BMI 20.43 kg/m²     Labs:   No results for input(s): RBC, HEMOGLOBIN, HEMATOCRIT, PLATELETCT, PROTHROMBTM, APTT, INR, IRON, FERRITIN, TOTIRONBC in the last 72 hours.  Lab Results   Component Value Date/Time    SODIUM 139 08/09/2018 08:56 AM    POTASSIUM 4.0 08/09/2018 08:56 AM    CHLORIDE 106 08/09/2018 08:56 AM    CO2 25 08/09/2018 08:56 AM    GLUCOSE 82 08/09/2018 08:56 AM    BUN 6 (L) 08/09/2018 08:56 AM    CREATININE 0.51 08/09/2018 08:56 AM      8/9/18 : 10.9/7.0/ 546,000   T Bili - 4.2. -- reviewed.  Assessment and Plan:    1. Sickle cell disease , Hgb SS , without acute crisis. HGB-7 g/dL - stable her baseline .     2. Gallstones newly diagnosed , abdominal discomfort-- GI evaluation ongoing . Discussed that in the case of Sickle cell disease will jorge to bring Hgb to 10g/dL before GB surgery will be performed . At this time no plans for surgical consult yet , but patient is contemplating surgery. We reviewed the characteristic " increased incidence of gallstones in sickle cell disease.    3. Mild elevated total billirubin noted on labs from 8/9/18 -- will repeat LFT for close monitoring.     4. Repeat for now US gallbladder in 3 months and follow up in 3 months for final plans for when the gallbladder surgery will be completed.     She agreed and verbalized her agreement and understanding with the current plan.  I answered all questions and concerns she has at this time.              Thank you for allowing me to participate in her care.      All labs and/or imaging studies mentioned in the note above were reviewed with and explained to the patient as a pertain to medical decision making.        SIGNATURES:  Beti Walter    CC:  BRAD Hassan, Marlee ADAMS P.A.-C.

## 2018-09-13 NOTE — PATIENT INSTRUCTIONS
1. Blood work today , recheck - CBC and liver function tests. Will call with results if abnormal   2. Consider to repeat US gallbladder in 3 months --  3. If surgery will be needed your hemoglobin needs to be 10, so will transfuse before surgery as per standard of care.   4. See hematology iv 3 months or sooner if surgery scheduled .

## 2018-09-24 ENCOUNTER — OFFICE VISIT (OUTPATIENT)
Dept: MEDICAL GROUP | Facility: PHYSICIAN GROUP | Age: 23
End: 2018-09-24
Payer: COMMERCIAL

## 2018-09-24 VITALS
OXYGEN SATURATION: 93 % | HEART RATE: 87 BPM | BODY MASS INDEX: 20.45 KG/M2 | RESPIRATION RATE: 16 BRPM | TEMPERATURE: 98.2 F | WEIGHT: 119.8 LBS | SYSTOLIC BLOOD PRESSURE: 106 MMHG | DIASTOLIC BLOOD PRESSURE: 62 MMHG | HEIGHT: 64 IN

## 2018-09-24 DIAGNOSIS — K80.20 GALLSTONES: ICD-10-CM

## 2018-09-24 DIAGNOSIS — Z23 NEED FOR VACCINATION: ICD-10-CM

## 2018-09-24 DIAGNOSIS — Z30.013 ENCOUNTER FOR INITIAL PRESCRIPTION OF INJECTABLE CONTRACEPTIVE: ICD-10-CM

## 2018-09-24 DIAGNOSIS — D57.1 HB-SS DISEASE WITHOUT CRISIS (HCC): ICD-10-CM

## 2018-09-24 DIAGNOSIS — F41.9 ANXIETY: ICD-10-CM

## 2018-09-24 LAB
INT CON NEG: NEGATIVE
INT CON POS: POSITIVE
POC URINE PREGNANCY TEST: NEGATIVE

## 2018-09-24 PROCEDURE — 90670 PCV13 VACCINE IM: CPT | Performed by: PHYSICIAN ASSISTANT

## 2018-09-24 PROCEDURE — 81025 URINE PREGNANCY TEST: CPT | Performed by: PHYSICIAN ASSISTANT

## 2018-09-24 PROCEDURE — 99214 OFFICE O/P EST MOD 30 MIN: CPT | Mod: 25 | Performed by: PHYSICIAN ASSISTANT

## 2018-09-24 PROCEDURE — 90471 IMMUNIZATION ADMIN: CPT | Performed by: PHYSICIAN ASSISTANT

## 2018-09-24 RX ORDER — MEDROXYPROGESTERONE ACETATE 150 MG/ML
150 INJECTION, SUSPENSION INTRAMUSCULAR ONCE
Qty: 1 ML | Refills: 0 | OUTPATIENT
Start: 2018-09-24 | End: 2018-09-24 | Stop reason: CLARIF

## 2018-09-24 RX ORDER — CITALOPRAM 20 MG/1
20 TABLET ORAL DAILY
Qty: 30 TAB | Refills: 3 | Status: ON HOLD | OUTPATIENT
Start: 2018-09-24 | End: 2019-01-15

## 2018-09-24 RX ORDER — MEDROXYPROGESTERONE ACETATE 150 MG/ML
150 INJECTION, SUSPENSION INTRAMUSCULAR ONCE
Status: COMPLETED | OUTPATIENT
Start: 2018-09-24 | End: 2018-09-24

## 2018-09-24 RX ADMIN — MEDROXYPROGESTERONE ACETATE 150 MG: 150 INJECTION, SUSPENSION INTRAMUSCULAR at 09:23

## 2018-09-24 NOTE — PROGRESS NOTES
Chief Complaint   Patient presents with   • Medication Management       HISTORY OF PRESENT ILLNESS: Alicia Tejada is an established 23 y.o. female here to discuss the evaluation and management of:    Encounter for initial prescription of injectable contraceptive  Patient would like to proceed with Depo-Provera.  She tells me that she is tried oral contraceptives in the past and was noncompliant with medication.  She feels Depo-Provera would best fit her needs.   She tells me that she is sexually active with one partner and experiences long menstrual periods. States menstrual cycles are regular and menses for 7 days in duration.  Experiences heavy bleeding on days 1-3 and moderate to light bleeding on the remainder days.  + for moderate to severe cramping and takes over-the-counter ibuprofen or Advil with mild alleviation symptoms.  Patient has a positive medical history for sickle cell anemia and is following up with hematology.  Denies personal or family history of DVT/PEs.  Admits to regular exercise routine.  Denies smoking.    Anxiety  Chronic but stable problem.  During her last appointment on 8/16/2018 patient was prescribed Celexa 10 mg tablet once daily.  She tells me she has been compliant with medication and experiences no side effects or complications from medication.  States since initiating medication anxiety symptoms have improved and she is sleeping some better.  States she still occasionally has difficulty falling asleep due to racing thoughts.  States on average she sleeps 6 hours per night.  She mentions that she feels less anxious at work.  Denies panic attacks since initiating medication.  Denies homicidal or suicidal ideation.  States she has a healthy diet and regular exercise routine.    Gallstones  Chronic problem.  She tells me that she is following up with [facility].  States she was advised by her gastroenterologist that cholecystectomy was not warranted at this  time.  Patient is waiting and watching.  States she is still experiencing abdominal bloating and fullness.  States she is undergoing further workup.  Still taking over-the-counter MiraLAX for constipation.  Denies nausea, vomiting, fever, chills, abdominal pain.    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  Since her last appointment on 2018 patient has established with hematology and will be following patient closely.  Asymptomatic at this time.      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    Current Outpatient Prescriptions   Medication Sig Dispense Refill   • citalopram (CELEXA) 20 MG Tab Take 1 Tab by mouth every day. 30 Tab 3   • cetirizine (ZYRTEC) 10 MG Tab Take 10 mg by mouth every day.       No current facility-administered medications for this visit.        Social History   Substance Use Topics   • Smoking status: Never Smoker   • Smokeless tobacco: Never Used   • Alcohol use Yes      Comment: OCC. No-Binge Drinking.        Family Status   Relation Status   • Mo Alive   • Fa Alive   • Bro Alive   • MGMo    • MGFa    • PGMo Alive   • PGFa Alive   • Bro Alive   • Bro Alive   • MUnc Alive     Family History   Problem Relation Age of Onset   • No Known Problems Mother    • No Known Problems Father    • No Known Problems Brother    • Diabetes Maternal Grandmother         Type II   • Hypertension Maternal Grandmother    • Diabetes Paternal Grandmother         Type II   • Hypertension Paternal Grandmother    • No Known Problems Brother    • GI Brother         GI Ulcers   • Other Maternal Uncle   "       Sickle Cell        ROS:  Review of Systems   Constitutional: Negative for fever, chills, weight loss and malaise/fatigue.   HENT: Negative for ear pain, nosebleeds, congestion, sore throat and neck pain.    Eyes: Negative for blurred vision.   Respiratory: Negative for cough, sputum production, shortness of breath and wheezing.    Cardiovascular: Negative for chest pain, palpitations, orthopnea and leg swelling.   Gastrointestinal: Negative for heartburn, nausea, vomiting and abdominal pain.  Positive for abdominal bloating and fullness.  Genitourinary: Negative for dysuria, urgency and frequency.   Musculoskeletal: Negative for myalgias, back pain and joint pain.   Skin: Negative for rash and itching.   Neurological: Negative for dizziness, tingling, tremors, sensory change, focal weakness and headaches.   Endo/Heme/Allergies: Does not bruise/bleed easily.   Psychiatric/Behavioral: Negative for suicidal ideas and memory loss.  The patient is not nervous/anxious and does not have insomnia.  + for anxiety.  All other systems reviewed and are negative except as in HPI.    Exam: Blood pressure 106/62, pulse 87, temperature 36.8 °C (98.2 °F), temperature source Temporal, resp. rate 16, height 1.626 m (5' 4\"), weight 54.3 kg (119 lb 12.8 oz), SpO2 93 %, not currently breastfeeding. Body mass index is 20.56 kg/m².  General: Normal appearing. No distress.  HEENT: Normocephalic. Eyes conjunctiva clear lids without ptosis, pupils equal and reactive to light accommodation, ears normal shape and contour, canals are clear bilaterally, tympanic membranes are benign, nasal mucosa benign, oropharynx is without erythema, edema or exudates.   Neck: Supple without JVD or bruit. Thyroid is not enlarged.  Pulmonary: Clear to ausculation.  Normal effort. No rales, ronchi, or wheezing.  Cardiovascular: Regular rate and rhythm without murmur.   Abdomen: Soft, nontender, nondistended. Normal bowel sounds. Liver and spleen are not " palpable.  Neurologic: Grossly nonfocal.  Cranial nerves are normal.  Lymph: No cervical, supraclavicular or axillary lymph nodes are palpable  Skin: Warm and dry.  No rashes or suspicious skin lesions.  Musculoskeletal: Normal gait. No extremity cyanosis, clubbing, or edema.  Psych: Normal mood and affect. Alert and oriented x3. Judgment and insight is normal.    Medical decision-making and discussion:  1. Anxiety  During today's appointment discontinued Celexa 10 mg tab and increase dosage to 20 mg tab once daily.  Discussed common side effects and adverse reactions of medication with patient.  Advised patient that she may explain side effects for 2+ weeks but side effect symptoms should subside and she will start feeling the benefits of the medication soon after.  Advised patient to continue using coping mechanisms and working on diet and exercise.  Practice good sleep hygiene.  Patient will follow-up in 8 weeks for med eval.    Denies any suicidal or homicidal ideation. Discussed that should the patient have any symptoms they should call suicide prevention hotline or report to the emergency room immediately.    - citalopram (CELEXA) 20 MG Tab; Take 1 Tab by mouth every day.  Dispense: 30 Tab; Refill: 3    2. Encounter for initial prescription of injectable contraceptive  POCT Pregnancy: Negative    Discussed OCPs at length.  Patient will be getting Depo-Provera during today's appointment.  Advised patient she will do be due for next vaccination in 3 months.  Use back-up for up to 2 weeks. Discussed risks associated with OCPs including: blood clot, heart attack, and stroke.  Patient denies smoking.  Advised not to smoke while on hormonal birth control.  Denies personal history or family history of DVT/PEs.    Discussed with patient that most common side effect is breakthrough bleeding.  Advised patient if she develops this symptom to contact me.    - POCT PREGNANCY  - medroxyPROGESTERone (DEPO-PROVERA) injection  150 mg; 1 mL by Intramuscular route Once.      3. Gallstones  Chronic problem.  Advised patient to continue following up with [facility] as indicated.  Continue current medication regimen.  Will continue to monitor.    4. 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  Continue following up with hematology as indicated.  Will continue to monitor.    5. Need for vaccination  A Prevnar vaccination was administered to patient without complication. A VIS form was provided to patient.     - Pneumococcal Conjugate Vaccine 13-Valent      Please note that this dictation was created using voice recognition software. I have made every reasonable attempt to correct obvious errors, but I expect that there are errors of grammar and possibly content that I did not discover before finalizing the note.      Return in about 2 months (around 11/24/2018).

## 2018-09-24 NOTE — ASSESSMENT & PLAN NOTE
Chronic but stable problem.  Since her last appointment on 8/16/2018 patient has established with hematology and will be following patient closely.  Asymptomatic at this time.

## 2018-09-24 NOTE — ASSESSMENT & PLAN NOTE
Patient would like to proceed with Depo-Provera.  She tells me that she is tried oral contraceptives in the past and was noncompliant with medication.  She feels Depo-Provera would best fit her needs.   She tells me that she is sexually active with one partner and experiences long menstrual periods. States menstrual cycles are regular and menses for 7 days in duration.  Experiences heavy bleeding on days 1-3 and moderate to light bleeding on the remainder days.  + for moderate to severe cramping and takes over-the-counter ibuprofen or Advil with mild alleviation symptoms.  Patient has a positive medical history for sickle cell anemia and is following up with hematology.  Denies personal or family history of DVT/PEs.  Admits to regular exercise routine.  Denies smoking.

## 2018-09-24 NOTE — ASSESSMENT & PLAN NOTE
Chronic problem.  She tells me that she is following up with [facility].  States she was advised by her gastroenterologist that cholecystectomy was not warranted at this time.  Patient is waiting and watching.  States she is still experiencing abdominal bloating and fullness.  States she is undergoing further workup.  Still taking over-the-counter MiraLAX for constipation.  Denies nausea, vomiting, fever, chills, abdominal pain.

## 2018-09-24 NOTE — ASSESSMENT & PLAN NOTE
Chronic but stable problem.  During her last appointment on 8/16/2018 patient was prescribed Celexa 10 mg tablet once daily.  She tells me she has been compliant with medication and experiences no side effects or complications from medication.  States since initiating medication anxiety symptoms have improved and she is sleeping some better.  States she still occasionally has difficulty falling asleep due to racing thoughts.  States on average she sleeps 6 hours per night.  She mentions that she feels less anxious at work.  Denies panic attacks since initiating medication.  Denies homicidal or suicidal ideation.  States she has a healthy diet and regular exercise routine.

## 2018-10-05 ENCOUNTER — NON-PROVIDER VISIT (OUTPATIENT)
Dept: MEDICAL GROUP | Facility: MEDICAL CENTER | Age: 23
End: 2018-10-05
Payer: COMMERCIAL

## 2018-10-05 DIAGNOSIS — Z23 NEED FOR VACCINATION: ICD-10-CM

## 2018-10-05 PROCEDURE — 90471 IMMUNIZATION ADMIN: CPT | Performed by: FAMILY MEDICINE

## 2018-10-05 PROCEDURE — 90686 IIV4 VACC NO PRSV 0.5 ML IM: CPT | Performed by: FAMILY MEDICINE

## 2018-10-05 NOTE — NON-PROVIDER
"Alicia Tejada is a 23 y.o. female here for a non-provider visit for:   FLU    Reason for immunization: Annual Flu Vaccine  Immunization records indicate need for vaccine: Yes, confirmed with Epic  Minimum interval has been met for this vaccine: Yes  ABN completed: Not Indicated    Order and dose verified by: [facility]  VIS Dated  8/7/15 was given to patient: Yes  All IAC Questionnaire questions were answered \"No.\"    Patient tolerated injection and no adverse effects were observed or reported: Yes    Pt scheduled for next dose in series: Not Indicated    "

## 2018-10-23 ENCOUNTER — HOSPITAL ENCOUNTER (OUTPATIENT)
Dept: RADIOLOGY | Facility: MEDICAL CENTER | Age: 23
End: 2018-10-23
Attending: INTERNAL MEDICINE
Payer: COMMERCIAL

## 2018-10-23 DIAGNOSIS — K80.20 GALLSTONES: ICD-10-CM

## 2018-10-23 PROCEDURE — 76705 ECHO EXAM OF ABDOMEN: CPT

## 2018-11-01 ENCOUNTER — TELEPHONE (OUTPATIENT)
Dept: HEMATOLOGY ONCOLOGY | Facility: MEDICAL CENTER | Age: 23
End: 2018-11-01

## 2018-11-01 DIAGNOSIS — D57.1 HB-SS DISEASE WITHOUT CRISIS (HCC): ICD-10-CM

## 2018-11-01 DIAGNOSIS — K80.20 GALLSTONES: ICD-10-CM

## 2018-11-01 DIAGNOSIS — R79.89 ABNORMAL BILIRUBIN TEST: ICD-10-CM

## 2018-11-01 NOTE — TELEPHONE ENCOUNTER
I spoke to patient letting her know that we faxed over a referral to [facility] for her and we need to get her an appointment to establish care with Dr. Garcia.    I scheduled patient on 11/29/18 at 8:00 am. Patient stated she will call to rescheduled if she needs to.

## 2018-11-01 NOTE — TELEPHONE ENCOUNTER
----- Message from ROSA ELENA Casanova sent at 11/1/2018  9:23 AM PDT -----  Patient saw Dr. Walter for sickle cell disease.  She needs to establish care with Dr. Garcia.  I have placed referral to general surgery to discuss gallbladder removal, but she should get in with him in the next 2 weeks.  Can you please follow the referral as well to make sure she is seen by surgery?    Thank you

## 2018-11-01 NOTE — TELEPHONE ENCOUNTER
Patient establish care with Dr. Walter, hematologist.  Ultrasound of the gallbladder was completed which does show multiple large and small gallstones as well as gallbladder sludge.  I discussed the results with Dr. Morin, and due to patient's underlying sickle cell disease as well as ultrasound report recommendation for surgical intervention regarding the abnormal findings of the gallbladder.  I spoke to the patient personally this morning and she verbalized understanding.  Patient is in agreement for referral to surgery to proceed with surgical intervention.    I will also have patient scheduled to transfer care to Dr. Garcia, hematologist for continued monitoring regarding her sickle cell disease.  Patient verbalized understanding was appreciative of the phone call.      Us-ruq    Result Date: 10/23/2018  10/23/2018 10:22 AM HISTORY/REASON FOR EXAM:  Calculus Abdominal pain TECHNIQUE/EXAM DESCRIPTION AND NUMBER OF VIEWS:  Real-time sonography of the liver and biliary tree. COMPARISON: 8/9/2018 FINDINGS: The liver is normal in contour. There is no evidence of solid mass lesion. The liver measures 15.85 cm. The gallbladder contains multiple large and small stones with sludge. The gallbladder wall thickness measures 0.24 cm. There is no pericholecystic fluid. The common duct measures 0.43 cm. The visualized pancreas is unremarkable. The visualized aorta is normal in caliber. Intrahepatic IVC is patent. The portal vein is patent with hepatopetal flow. The right kidney measures 10.67 cm. There is no ascites.     Multiple large and small gallstones as well as gallbladder sludge.

## 2018-11-16 ENCOUNTER — TELEPHONE (OUTPATIENT)
Dept: HEMATOLOGY ONCOLOGY | Facility: MEDICAL CENTER | Age: 23
End: 2018-11-16

## 2018-11-16 NOTE — TELEPHONE ENCOUNTER
Patient stated she will see Dr. Greene at [facility] [place] on Monday 11/19/18 at 9:30 am.

## 2018-11-16 NOTE — TELEPHONE ENCOUNTER
----- Message from ROSA ELENA Casanova sent at 11/15/2018  3:03 PM PST -----  Can you check to see if and when the patient is scheduled to see surgery?

## 2018-11-29 ENCOUNTER — OFFICE VISIT (OUTPATIENT)
Dept: HEMATOLOGY ONCOLOGY | Facility: MEDICAL CENTER | Age: 23
End: 2018-11-29
Payer: COMMERCIAL

## 2018-11-29 ENCOUNTER — OFFICE VISIT (OUTPATIENT)
Dept: MEDICAL GROUP | Facility: PHYSICIAN GROUP | Age: 23
End: 2018-11-29
Payer: COMMERCIAL

## 2018-11-29 ENCOUNTER — HOSPITAL ENCOUNTER (OUTPATIENT)
Facility: MEDICAL CENTER | Age: 23
End: 2018-11-29
Attending: PHYSICIAN ASSISTANT
Payer: COMMERCIAL

## 2018-11-29 VITALS
TEMPERATURE: 97.9 F | DIASTOLIC BLOOD PRESSURE: 70 MMHG | SYSTOLIC BLOOD PRESSURE: 102 MMHG | RESPIRATION RATE: 16 BRPM | BODY MASS INDEX: 20.66 KG/M2 | WEIGHT: 121.03 LBS | HEART RATE: 89 BPM | OXYGEN SATURATION: 94 % | HEIGHT: 64 IN

## 2018-11-29 VITALS
TEMPERATURE: 98.3 F | WEIGHT: 121 LBS | HEIGHT: 64 IN | DIASTOLIC BLOOD PRESSURE: 60 MMHG | RESPIRATION RATE: 14 BRPM | OXYGEN SATURATION: 97 % | HEART RATE: 83 BPM | SYSTOLIC BLOOD PRESSURE: 92 MMHG | BODY MASS INDEX: 20.66 KG/M2

## 2018-11-29 DIAGNOSIS — Z12.4 SCREENING FOR CERVICAL CANCER: ICD-10-CM

## 2018-11-29 DIAGNOSIS — Z23 NEED FOR VACCINATION: ICD-10-CM

## 2018-11-29 DIAGNOSIS — Z01.419 WOMEN'S ANNUAL ROUTINE GYNECOLOGICAL EXAMINATION: Primary | ICD-10-CM

## 2018-11-29 DIAGNOSIS — Z01.419 WOMEN'S ANNUAL ROUTINE GYNECOLOGICAL EXAMINATION: ICD-10-CM

## 2018-11-29 DIAGNOSIS — D57.1 HB-SS DISEASE WITHOUT CRISIS (HCC): ICD-10-CM

## 2018-11-29 PROCEDURE — 87591 N.GONORRHOEAE DNA AMP PROB: CPT

## 2018-11-29 PROCEDURE — 99395 PREV VISIT EST AGE 18-39: CPT | Mod: 25 | Performed by: PHYSICIAN ASSISTANT

## 2018-11-29 PROCEDURE — 99214 OFFICE O/P EST MOD 30 MIN: CPT | Performed by: INTERNAL MEDICINE

## 2018-11-29 PROCEDURE — 87491 CHLMYD TRACH DNA AMP PROBE: CPT

## 2018-11-29 PROCEDURE — 90471 IMMUNIZATION ADMIN: CPT | Performed by: PHYSICIAN ASSISTANT

## 2018-11-29 PROCEDURE — 90732 PPSV23 VACC 2 YRS+ SUBQ/IM: CPT | Performed by: PHYSICIAN ASSISTANT

## 2018-11-29 PROCEDURE — 88175 CYTOPATH C/V AUTO FLUID REDO: CPT

## 2018-11-29 ASSESSMENT — PAIN SCALES - GENERAL: PAINLEVEL: NO PAIN

## 2018-11-29 NOTE — PROGRESS NOTES
SUBJECTIVE: 23 y.o. female for annual routine gynecologic exam  Chief Complaint   Patient presents with   • Annual Exam     PAP       Obstetric History     No data available      Last Pap: Never  History   Sexual Activity   • Sexual activity: Not Currently   • Partners: Male   • Birth control/ protection: Condom     Sexual history: previously sexually active but currently abstaining, single partner, heterosexual, on Depo Provera   H/O Abnormal Pap no  She  reports that she has never smoked. She has never used smokeless tobacco.        Allergies: Patient has no known allergies.     ROS:    Reports no menopause symptoms of hot flashes, night sweats, sleep disruption, mood changes.Denies vaginal dryness.   Patient is on Depo-Provera and no longer has a menses.   She does not take OTC analgesics for cramping  No significant bloating/fluid retention, pelvic pain, or dyspareunia. No vaginal discharge   No breast tenderness, mass, nipple discharge, changes in size or contour, or abnormal cyclic discomfort.  No urinary tract symptoms, no incontinence, no polydipsia, polyuria,  No abdominal pain, change in bowel habits, black or bloody stools.    No unusual headaches, no visual changes, menstrual migraines   No prolonged cough. No dyspnea or chest pain on exertion.  No depression, labile mood, anxiety, libido changes, insomnia.  No temperature intolerance.  No new/concerning skin lesions, concerns.     Exercise: no regular exercise program  Preventive Care: Yes, self breast exam every 6 months. Wears sunscreen when outdoors. Wears seatbelt.     Current medicines (including changes today)  Current Outpatient Prescriptions   Medication Sig Dispense Refill   • citalopram (CELEXA) 20 MG Tab Take 1 Tab by mouth every day. 30 Tab 3   • cetirizine (ZYRTEC) 10 MG Tab Take 10 mg by mouth every day.       No current facility-administered medications for this visit.      She  has no past medical history on file.  She  has a past  "surgical history that includes tonsillectomy.     Family History:   Family History   Problem Relation Age of Onset   • No Known Problems Mother    • No Known Problems Father    • No Known Problems Brother    • Diabetes Maternal Grandmother         Type II   • Hypertension Maternal Grandmother    • Diabetes Paternal Grandmother         Type II   • Hypertension Paternal Grandmother    • No Known Problems Brother    • GI Brother         GI Ulcers   • Other Maternal Uncle         Sickle Cell        OBJECTIVE:   BP (!) 92/60   Pulse 83   Temp 36.8 °C (98.3 °F)   Resp 14   Ht 1.626 m (5' 4\")   Wt 54.9 kg (121 lb)   SpO2 97%   BMI 20.77 kg/m²   Body mass index is 20.77 kg/m².    Exam: Blood pressure (!) 92/60, pulse 83, temperature 36.8 °C (98.3 °F), resp. rate 14, height 1.626 m (5' 4\"), weight 54.9 kg (121 lb), SpO2 97 %, not currently breastfeeding.  General: Normal appearing. No distress.  HEENT: Normocephalic. Eyes conjunctiva clear lids without ptosis, pupils equal and reactive to light accommodation, ears normal shape and contour, canals are clear bilaterally, tympanic membranes are benign, nasal mucosa benign, oropharynx is without erythema, edema or exudates.   Neck: Supple without JVD or bruit. Thyroid is not enlarged.  Pulmonary: Clear to ausculation.  Normal effort. No rales, ronchi, or wheezing.  Cardiovascular: Regular rate and rhythm without murmur. Carotid and radial pulses are intact and equal bilaterally.  Abdomen: Soft, nontender, nondistended. Normal bowel sounds. Liver and spleen are not palpable  Neurologic: Grossly nonfocal.  Cranial nerves are normal. DTR's normal and symmetric.  Lymph: No cervical, supraclavicular or axillary lymph nodes are palpable  Skin: Warm and dry.  No rashes or suspicious skin lesions.  Musculoskeletal: Normal gait. No extremity cyanosis, clubbing, or edema.  Psych: Normal mood and affect. Alert and oriented x3. Judgment and insight is normal.     Breast Exam: " Performed with instruction during examination. No axillary lymphadenopathy, no skin changes, no dominant masses. No nipple retraction  Pelvic Exam -  Normal external genitalia with no lesions. Vaginal Mucosa:  normal vaginal mucosa, normal discharge . Cervix with no visible lesions. No cervical motion tenderness. Uterus is normal sized with no masses. No adnexal tenderness or enlargement appreciated. Thin Prep Pap is obtained, vaginal swab is obtained and specimen(s) sent to lab  Rectal: deferred    <ASSESSMENT and PLAN>  1. Women's annual routine gynecological examination  2. Screening for cervical cancer    Thin Prep Pap is obtained, vaginal swab is obtained and specimen(s) sent to lab    - THINPREP RFLX HPV ASCUS W/CTNG; Future    3. Need for vaccination  Pneumovax vaccination was administered to patient without complication. A VIS form was provided to patient.     - Pneumococal Polysaccharide Vaccine 23-Valent =>3YO SQ/IM    Patient has been scheduled to complete [facility] paperwork on 12/6/2018 for upcoming total cystectomy.      Discussed  breast self exam, mammography screening, STD prevention, HIV risk factors and prevention, feminine hygiene, adequate intake of calcium and vitamin D, diet and exercise   Follow-up in 3 years for next Gyn exam and 3 years for next Pap.   Next office visit for recheck of chronic medical conditions is due in  1 year

## 2018-11-29 NOTE — PROGRESS NOTES
Date of visit: 11/29/2018  8:17 AM      Chief Complaint- Sickle cell Anemia -- Hgb - SS      History of presenting illness:  23 year old female , diagnosed with sickle cell anemia as an infant, presents today to [facility] care. She had been doing well , she received only 2 PRBC transfusions at 7 years of age and no need since then. One episode of pneumonia -in early childhood, and no other infections . One episode of sickle cell crisis -1/2014 , without recurrence .Presents with abdominal pain , RUQ pain late July early August 2018 .      US gallbladder: 8/9/18 : FINDINGS: There is no ascites. The liver is normal in contour. There is no evidence of solid mass lesion. The liver measures 18.22 cm. Gallbladder is contracted and demonstrates shadowing most likely due to multiple stones within the gallbladder. The gallbladder wall measures 0.24 cc.  There is no pericholecystic fluid.  The common duct measures 0.34 cm. The visualized pancreas is unremarkable. The visualized aorta is normal in caliber. Intrahepatic IVC is patent. The portal vein is patent with hepatopetal flow measuring 10 mm.     Seen by surgery.  Cholecystectomy scheduled for January.  Surgery wants hemoglobin to be around 10 notable.    Past Medical History:    No past medical history on file.    Past surgical history:       Past Surgical History:   Procedure Laterality Date   • TONSILLECTOMY         Allergies:       Patient has no known allergies.    Medications:         Current Outpatient Prescriptions   Medication Sig Dispense Refill   • citalopram (CELEXA) 20 MG Tab Take 1 Tab by mouth every day. 30 Tab 3   • cetirizine (ZYRTEC) 10 MG Tab Take 10 mg by mouth every day.       No current facility-administered medications for this visit.          Social History:     Social History     Social History   • Marital status: Single     Spouse name: N/A   • Number of children: N/A   • Years of education: N/A     Occupational History   • Not on file.  "    Social History Main Topics   • Smoking status: Never Smoker   • Smokeless tobacco: Never Used   • Alcohol use Yes      Comment: OCC. No-Binge Drinking.    • Drug use: No   • Sexual activity: Not Currently     Partners: Male     Birth control/ protection: Condom      Comment: In a committed relationship.     Other Topics Concern   • Not on file     Social History Narrative   • No narrative on file       Family History:      Family History   Problem Relation Age of Onset   • No Known Problems Mother    • No Known Problems Father    • No Known Problems Brother    • Diabetes Maternal Grandmother         Type II   • Hypertension Maternal Grandmother    • Diabetes Paternal Grandmother         Type II   • Hypertension Paternal Grandmother    • No Known Problems Brother    • GI Brother         GI Ulcers   • Other Maternal Uncle         Sickle Cell        Review of Systems:  All other review of systems are negative except what was mentioned above in the HPI.    Constitutional: Negative for fever, chills, weight loss and malaise/fatigue.    HEENT: No new auditory or visual complaints. No sore throat and neck pain.     Respiratory: Negative for cough, sputum production, shortness of breath and wheezing.    Cardiovascular: Negative for chest pain, palpitations, orthopnea and leg swelling.    Gastrointestinal: Negative for heartburn, nausea, vomiting and abdominal pain.    Genitourinary: Negative for dysuria, hematuria    Musculoskeletal: No new arthralgias or myalgias   Skin: Negative for rash and itching.    Neurological: Negative for focal weakness and headaches.    Endo/Heme/Allergies: No abnormal bleed/bruise.    Psychiatric/Behavioral: No new depression/anxiety.    Physical Exam:  Vitals: /70 (BP Location: Right arm, Patient Position: Sitting, BP Cuff Size: Small adult)   Pulse 89   Temp 36.6 °C (97.9 °F) (Temporal)   Resp 16   Ht 1.626 m (5' 4\")   Wt 54.9 kg (121 lb 0.5 oz)   SpO2 94%   BMI 20.78 kg/m² "     General: Not in acute distress, alert and oriented x 3  HEENT: No pallor, icterus. Oropharynx clear.   Neck: Supple, no palpable masses.  Lymph nodes: No palpable cervical, supraclavicular, axillary or inguinal lymphadenopathy.    CVS: regular rate and rhythm, no rubs or gallops  RESP: Clear to auscultate bilaterally, no wheezing or crackles.   ABD: Soft, non tender, non distended, positive bowel sounds, no palpable organomegaly  EXT: No edema or cyanosis  CNS: Alert and oriented x3, No focal deficits.  Skin- No rash.      Labs:   No visits with results within 1 Week(s) from this visit.   Latest known visit with results is:   Office Visit on 09/24/2018   Component Date Value Ref Range Status   • POC Urine Pregnancy Test 09/24/2018 Negative  Negative Final   • Internal Control Positive 09/24/2018 Positive   Final   • Internal Control Negative 09/24/2018 Negative   Final             Assessment and Plan:  Sickle cell Anemia -- Hgb - SS   -Received her pediatric care at [facility].  We will obtain records.  She was never started on Hydrea  -She does have chronic anemia from which she is asymptomatic.  She has developed gallstones.  No history of stroke or dactylitis.  Fortunately no recurrent pain crisis chest syndrome or stroke.  -We will obtain LDH and reticulocyte count and a baseline hemoglobin electrophoresis.  -We will arrange 2 units of packed red blood cell transfusion on oh 2 days prior to her upcoming surgery.  -We will discussed the option of starting Hydrea/ L- Glutamine when she returns to the clinic    She agreed and verbalized  agreement and understanding with the current plan.  I answered all questions and concerns at this time         Please note that this dictation was created using voice recognition software. I have made every reasonable attempt to correct obvious errors, but I expect that there are errors of grammar and possibly content that I did not discover before finalizing  the note.      SIGNATURES:  Prakash Garcia    CC:  Marlee Shearer P.A.-C.  No ref. provider found

## 2018-12-01 LAB
C TRACH DNA GENITAL QL NAA+PROBE: NEGATIVE
CYTOLOGY REG CYTOL: NORMAL
N GONORRHOEA DNA GENITAL QL NAA+PROBE: NEGATIVE
SPECIMEN SOURCE: NORMAL

## 2018-12-06 ENCOUNTER — OFFICE VISIT (OUTPATIENT)
Dept: MEDICAL GROUP | Facility: PHYSICIAN GROUP | Age: 23
End: 2018-12-06
Payer: COMMERCIAL

## 2018-12-06 VITALS
HEART RATE: 100 BPM | TEMPERATURE: 99.4 F | HEIGHT: 64 IN | RESPIRATION RATE: 14 BRPM | WEIGHT: 121 LBS | DIASTOLIC BLOOD PRESSURE: 68 MMHG | BODY MASS INDEX: 20.66 KG/M2 | SYSTOLIC BLOOD PRESSURE: 100 MMHG | OXYGEN SATURATION: 96 %

## 2018-12-06 DIAGNOSIS — K80.20 CALCULUS OF GALLBLADDER WITHOUT CHOLECYSTITIS WITHOUT OBSTRUCTION: ICD-10-CM

## 2018-12-06 PROCEDURE — 99213 OFFICE O/P EST LOW 20 MIN: CPT | Performed by: PHYSICIAN ASSISTANT

## 2018-12-06 NOTE — PROGRESS NOTES
Chief Complaint   Patient presents with   • Other     [facility] Paperwork       HISTORY OF PRESENT ILLNESS: Alicia Tejada is an established 23 y.o. female here to discuss the evaluation and management of:    Calculus of gallbladder without cholecystitis without obstruction    Patient is here today for completion of [facility] paperwork.  She tells me that she is scheduled for a cholecystectomy on 01/15/18 with [facility] with Dr. Greene.  States she is scheduled for postop on 18.  She tells me she feels she will be able to go back to work on 18.  She tells me she still experiencing abdominal bloating and fullness.  Patient is prescribed over-the-counter MiraLAX for constipation and has been advised to increase fiber consumption.  Denies nausea, vomiting, fever, chills, abdominal pain.    Paperwork will be completed during today's appointment.      Patient Active Problem List    Diagnosis Date Noted   • Calculus of gallbladder without cholecystitis without obstruction 2018   • Encounter for initial prescription of injectable contraceptive 2018   • Total bilirubin, elevated 2018   • Abnormal bilirubin test 2018   • Counseling for birth control, oral contraceptives 2018   • Anxiety 2018   • Hb-SS disease without crisis (HCC) 2018       Allergies:Patient has no known allergies.    Current Outpatient Prescriptions   Medication Sig Dispense Refill   • citalopram (CELEXA) 20 MG Tab Take 1 Tab by mouth every day. 30 Tab 3   • cetirizine (ZYRTEC) 10 MG Tab Take 10 mg by mouth every day.       No current facility-administered medications for this visit.        Social History   Substance Use Topics   • Smoking status: Never Smoker   • Smokeless tobacco: Never Used   • Alcohol use Yes      Comment: OCC. No-Binge Drinking.        Family Status   Relation Status   • Mo Alive   • Fa Alive   • Bro Alive   • MGMo    • MGFa    • PGMo Alive   • PGFa Alive  "  • Bro Alive   • Bro Alive   • MUnc Alive     Family History   Problem Relation Age of Onset   • No Known Problems Mother    • No Known Problems Father    • No Known Problems Brother    • Diabetes Maternal Grandmother         Type II   • Hypertension Maternal Grandmother    • Diabetes Paternal Grandmother         Type II   • Hypertension Paternal Grandmother    • No Known Problems Brother    • GI Brother         GI Ulcers   • Other Maternal Uncle         Sickle Cell        ROS:  Review of Systems   Constitutional: Negative for fever, chills, weight loss and malaise/fatigue.   HENT: Negative for ear pain, nosebleeds, congestion, sore throat and neck pain.    Eyes: Negative for blurred vision.   Respiratory: Negative for cough, sputum production, shortness of breath and wheezing.    Cardiovascular: Negative for chest pain, palpitations, orthopnea and leg swelling.   Gastrointestinal: Negative for heartburn, nausea, vomiting and abdominal pain.  Positive for abdominal bloating.  Genitourinary: Negative for dysuria, urgency and frequency.   Musculoskeletal: Negative for myalgias, back pain and joint pain.   Skin: Negative for rash and itching.   Neurological: Negative for dizziness, tingling, tremors, sensory change, focal weakness and headaches.   Endo/Heme/Allergies: Does not bruise/bleed easily.   Psychiatric/Behavioral: Negative for depression, suicidal ideas and memory loss.  The patient is not nervous/anxious and does not have insomnia.    All other systems reviewed and are negative except as in HPI.    Exam: Blood pressure 100/68, pulse 100, temperature 37.4 °C (99.4 °F), resp. rate 14, height 1.626 m (5' 4\"), weight 54.9 kg (121 lb), SpO2 96 %, not currently breastfeeding. Body mass index is 20.77 kg/m².  General: Normal appearing. No distress.  HEENT: Normocephalic. Eyes conjunctiva clear lids without ptosis, ears normal shape and contour.   Neck: Supple without JVD or bruit. Thyroid is not " enlarged.  Pulmonary: Clear to ausculation.  Normal effort. No rales, ronchi, or wheezing.  Cardiovascular: Regular rate and rhythm without murmur.   Abdomen: Soft, nontender, nondistended.   Neurologic: Grossly nonfocal.  Cranial nerves are normal.   Lymph: No cervical, supraclavicular or axillary lymph nodes are palpable  Skin: Warm and dry.  No rashes or suspicious skin lesions.  Musculoskeletal: Normal gait. No extremity cyanosis, clubbing, or edema.  Psych: Normal mood and affect. Alert and oriented x3. Judgment and insight is normal.    Medical decision-making and discussion:  1. Calculus of gallbladder without cholecystitis without obstruction  Continue following up with hematology as indicated.  Patient scheduled for cholecystectomy on 01/15/18 with Dr. Greene at [facility].  Advised patient to continue working on fiber consumption.    [facility] paperwork has been completed during today's appointment.       Please note that this dictation was created using voice recognition software. I have made every reasonable attempt to correct obvious errors, but I expect that there are errors of grammar and possibly content that I did not discover before finalizing the note.      Return if symptoms worsen or fail to improve.

## 2018-12-12 ENCOUNTER — TELEPHONE (OUTPATIENT)
Dept: HEMATOLOGY ONCOLOGY | Facility: MEDICAL CENTER | Age: 23
End: 2018-12-12

## 2018-12-12 NOTE — TELEPHONE ENCOUNTER
Called patient to schedule a follow up appointment with  for March . Patient stated she is unaware of her schedule for work and she prefers to call us to schedule her appointment. Patient is aware that  wants to see her around 3/19/19

## 2018-12-13 ENCOUNTER — NON-PROVIDER VISIT (OUTPATIENT)
Dept: MEDICAL GROUP | Facility: MEDICAL CENTER | Age: 23
End: 2018-12-13
Payer: COMMERCIAL

## 2018-12-13 PROCEDURE — 96372 THER/PROPH/DIAG INJ SC/IM: CPT | Performed by: NURSE PRACTITIONER

## 2018-12-13 RX ORDER — MEDROXYPROGESTERONE ACETATE 150 MG/ML
150 INJECTION, SUSPENSION INTRAMUSCULAR ONCE
Status: COMPLETED | OUTPATIENT
Start: 2018-12-13 | End: 2018-12-13

## 2018-12-13 RX ADMIN — MEDROXYPROGESTERONE ACETATE 150 MG: 150 INJECTION, SUSPENSION INTRAMUSCULAR at 11:23

## 2018-12-13 NOTE — NON-PROVIDER
Alicia Tejada is a 23 y.o. female here for a non-provider visit for Depo-provera injection.    Reason for injection: contraceptive   Order in MAR?: Yes  Patient supplied?:No  Minimum interval has been met for this injection (per MAR order): Yes    Order and dose verified by: aed  Patient tolerated injection and no adverse effects were observed or reported: Yes    # of Administrations remaining in MAR: 0

## 2019-01-03 ENCOUNTER — HOSPITAL ENCOUNTER (OUTPATIENT)
Dept: RADIOLOGY | Facility: MEDICAL CENTER | Age: 24
End: 2019-01-03
Attending: SURGERY | Admitting: SURGERY
Payer: COMMERCIAL

## 2019-01-03 DIAGNOSIS — Z01.812 PRE-PROCEDURAL LABORATORY EXAMINATION: ICD-10-CM

## 2019-01-03 DIAGNOSIS — Z01.810 PRE-OPERATIVE CARDIOVASCULAR EXAMINATION: ICD-10-CM

## 2019-01-03 DIAGNOSIS — Z01.811 PRE-OPERATIVE RESPIRATORY EXAMINATION: ICD-10-CM

## 2019-01-03 LAB
ALBUMIN SERPL BCP-MCNC: 4.5 G/DL (ref 3.2–4.9)
ALBUMIN SERPL BCP-MCNC: 4.5 G/DL (ref 3.2–4.9)
ALBUMIN/GLOB SERPL: 1.5 G/DL
ALP SERPL-CCNC: 42 U/L (ref 30–99)
ALP SERPL-CCNC: 45 U/L (ref 30–99)
ALT SERPL-CCNC: 15 U/L (ref 2–50)
ALT SERPL-CCNC: 17 U/L (ref 2–50)
AMYLASE SERPL-CCNC: 47 U/L (ref 20–103)
ANION GAP SERPL CALC-SCNC: 8 MMOL/L (ref 0–11.9)
AST SERPL-CCNC: 36 U/L (ref 12–45)
AST SERPL-CCNC: 39 U/L (ref 12–45)
BILIRUB CONJ SERPL-MCNC: 0.4 MG/DL (ref 0.1–0.5)
BILIRUB INDIRECT SERPL-MCNC: 2.8 MG/DL (ref 0–1)
BILIRUB SERPL-MCNC: 3.2 MG/DL (ref 0.1–1.5)
BILIRUB SERPL-MCNC: 3.2 MG/DL (ref 0.1–1.5)
BUN SERPL-MCNC: 6 MG/DL (ref 8–22)
CALCIUM SERPL-MCNC: 9.2 MG/DL (ref 8.5–10.5)
CHLORIDE SERPL-SCNC: 108 MMOL/L (ref 96–112)
CO2 SERPL-SCNC: 25 MMOL/L (ref 20–33)
CREAT SERPL-MCNC: 0.49 MG/DL (ref 0.5–1.4)
EKG IMPRESSION: NORMAL
ERYTHROCYTE [DISTWIDTH] IN BLOOD BY AUTOMATED COUNT: 75.4 FL (ref 35.9–50)
GLOBULIN SER CALC-MCNC: 3 G/DL (ref 1.9–3.5)
GLUCOSE SERPL-MCNC: 75 MG/DL (ref 65–99)
HCT VFR BLD AUTO: 21.5 % (ref 37–47)
HGB BLD-MCNC: 7.5 G/DL (ref 12–16)
MCH RBC QN AUTO: 30.2 PG (ref 27–33)
MCHC RBC AUTO-ENTMCNC: 34.9 G/DL (ref 33.6–35)
MCV RBC AUTO: 86.7 FL (ref 81.4–97.8)
PLATELET # BLD AUTO: 514 K/UL (ref 164–446)
PMV BLD AUTO: 10.2 FL (ref 9–12.9)
POTASSIUM SERPL-SCNC: 3.7 MMOL/L (ref 3.6–5.5)
PROT SERPL-MCNC: 7.5 G/DL (ref 6–8.2)
PROT SERPL-MCNC: 7.5 G/DL (ref 6–8.2)
RBC # BLD AUTO: 2.48 M/UL (ref 4.2–5.4)
SODIUM SERPL-SCNC: 141 MMOL/L (ref 135–145)
WBC # BLD AUTO: 9.5 K/UL (ref 4.8–10.8)

## 2019-01-03 PROCEDURE — 93010 ELECTROCARDIOGRAM REPORT: CPT | Performed by: INTERNAL MEDICINE

## 2019-01-03 PROCEDURE — 82150 ASSAY OF AMYLASE: CPT

## 2019-01-03 PROCEDURE — 36415 COLL VENOUS BLD VENIPUNCTURE: CPT

## 2019-01-03 PROCEDURE — 71046 X-RAY EXAM CHEST 2 VIEWS: CPT

## 2019-01-03 PROCEDURE — 85027 COMPLETE CBC AUTOMATED: CPT

## 2019-01-03 PROCEDURE — 93005 ELECTROCARDIOGRAM TRACING: CPT

## 2019-01-03 PROCEDURE — 80076 HEPATIC FUNCTION PANEL: CPT

## 2019-01-03 PROCEDURE — 80053 COMPREHEN METABOLIC PANEL: CPT

## 2019-01-03 RX ORDER — ALBUTEROL SULFATE 90 UG/1
2 AEROSOL, METERED RESPIRATORY (INHALATION) EVERY 6 HOURS PRN
COMMUNITY
End: 2021-03-11

## 2019-01-10 RX ORDER — LIDOCAINE HYDROCHLORIDE 10 MG/ML
20 INJECTION, SOLUTION INFILTRATION; PERINEURAL
Status: CANCELLED | OUTPATIENT
Start: 2019-01-13

## 2019-01-10 RX ORDER — SODIUM CHLORIDE 9 MG/ML
500 INJECTION, SOLUTION INTRAVENOUS ONCE
Status: CANCELLED | OUTPATIENT
Start: 2019-01-13 | End: 2019-01-13

## 2019-01-10 RX ORDER — ACETAMINOPHEN 325 MG/1
650 TABLET ORAL ONCE
Status: CANCELLED | OUTPATIENT
Start: 2019-01-13 | End: 2019-01-13

## 2019-01-10 RX ORDER — ACETAMINOPHEN 325 MG/1
650 TABLET ORAL PRN
Status: CANCELLED | OUTPATIENT
Start: 2019-01-13

## 2019-01-10 RX ORDER — DIPHENHYDRAMINE HCL 25 MG
25 TABLET ORAL ONCE
Status: CANCELLED | OUTPATIENT
Start: 2019-01-13 | End: 2019-01-13

## 2019-01-13 ENCOUNTER — OUTPATIENT INFUSION SERVICES (OUTPATIENT)
Dept: ONCOLOGY | Facility: MEDICAL CENTER | Age: 24
End: 2019-01-13
Attending: INTERNAL MEDICINE
Payer: COMMERCIAL

## 2019-01-13 VITALS
DIASTOLIC BLOOD PRESSURE: 59 MMHG | BODY MASS INDEX: 19.94 KG/M2 | RESPIRATION RATE: 20 BRPM | HEART RATE: 78 BPM | SYSTOLIC BLOOD PRESSURE: 104 MMHG | HEIGHT: 65 IN | TEMPERATURE: 96.5 F | OXYGEN SATURATION: 95 % | WEIGHT: 119.71 LBS

## 2019-01-13 DIAGNOSIS — D57.1 HB-SS DISEASE WITHOUT CRISIS (HCC): ICD-10-CM

## 2019-01-13 LAB
ABO GROUP BLD: NORMAL
ABO GROUP BLD: NORMAL
BARCODED ABORH UBTYP: 5100
BARCODED ABORH UBTYP: 9500
BARCODED PRD CODE UBPRD: NORMAL
BARCODED PRD CODE UBPRD: NORMAL
BARCODED UNIT NUM UBUNT: NORMAL
BARCODED UNIT NUM UBUNT: NORMAL
BLD GP AB SCN SERPL QL: NORMAL
COMPONENT R 8504R: NORMAL
COMPONENT R 8504R: NORMAL
KELL GROUP AG RBC: NORMAL
PRODUCT TYPE UPROD: NORMAL
PRODUCT TYPE UPROD: NORMAL
RH BLD: NORMAL
RH BLD: NORMAL
UNIT STATUS USTAT: NORMAL
UNIT STATUS USTAT: NORMAL

## 2019-01-13 PROCEDURE — 86850 RBC ANTIBODY SCREEN: CPT

## 2019-01-13 PROCEDURE — 86905 BLOOD TYPING RBC ANTIGENS: CPT | Mod: 91

## 2019-01-13 PROCEDURE — 36430 TRANSFUSION BLD/BLD COMPNT: CPT

## 2019-01-13 PROCEDURE — 36415 COLL VENOUS BLD VENIPUNCTURE: CPT

## 2019-01-13 PROCEDURE — A9270 NON-COVERED ITEM OR SERVICE: HCPCS | Performed by: NURSE PRACTITIONER

## 2019-01-13 PROCEDURE — 86923 COMPATIBILITY TEST ELECTRIC: CPT | Mod: 91

## 2019-01-13 PROCEDURE — 700102 HCHG RX REV CODE 250 W/ 637 OVERRIDE(OP): Performed by: NURSE PRACTITIONER

## 2019-01-13 PROCEDURE — P9016 RBC LEUKOCYTES REDUCED: HCPCS | Mod: 91

## 2019-01-13 PROCEDURE — 86901 BLOOD TYPING SEROLOGIC RH(D): CPT

## 2019-01-13 PROCEDURE — 700105 HCHG RX REV CODE 258: Performed by: NURSE PRACTITIONER

## 2019-01-13 PROCEDURE — 86902 BLOOD TYPE ANTIGEN DONOR EA: CPT

## 2019-01-13 PROCEDURE — 306780 HCHG STAT FOR TRANSFUSION PER CASE

## 2019-01-13 PROCEDURE — 86900 BLOOD TYPING SEROLOGIC ABO: CPT

## 2019-01-13 RX ORDER — ACETAMINOPHEN 325 MG/1
TABLET ORAL
Status: ACTIVE
Start: 2019-01-13 | End: 2019-01-13

## 2019-01-13 RX ORDER — DIPHENHYDRAMINE HCL 25 MG
25 TABLET ORAL ONCE
Status: COMPLETED | OUTPATIENT
Start: 2019-01-13 | End: 2019-01-13

## 2019-01-13 RX ORDER — SODIUM CHLORIDE 9 MG/ML
500 INJECTION, SOLUTION INTRAVENOUS ONCE
Status: CANCELLED | OUTPATIENT
Start: 2019-01-13 | End: 2019-01-13

## 2019-01-13 RX ORDER — DIPHENHYDRAMINE HCL 25 MG
TABLET ORAL
Status: ACTIVE
Start: 2019-01-13 | End: 2019-01-13

## 2019-01-13 RX ORDER — ACETAMINOPHEN 325 MG/1
650 TABLET ORAL PRN
Status: CANCELLED | OUTPATIENT
Start: 2019-01-13

## 2019-01-13 RX ORDER — ACETAMINOPHEN 325 MG/1
650 TABLET ORAL ONCE
Status: COMPLETED | OUTPATIENT
Start: 2019-01-13 | End: 2019-01-13

## 2019-01-13 RX ORDER — HEPARIN SODIUM (PORCINE) LOCK FLUSH IV SOLN 100 UNIT/ML 100 UNIT/ML
500 SOLUTION INTRAVENOUS PRN
Status: CANCELLED | OUTPATIENT
Start: 2019-01-13

## 2019-01-13 RX ORDER — DIPHENHYDRAMINE HCL 25 MG
25 TABLET ORAL ONCE
Status: CANCELLED | OUTPATIENT
Start: 2019-01-13 | End: 2019-01-13

## 2019-01-13 RX ORDER — SODIUM CHLORIDE 9 MG/ML
500 INJECTION, SOLUTION INTRAVENOUS ONCE
Status: COMPLETED | OUTPATIENT
Start: 2019-01-13 | End: 2019-01-13

## 2019-01-13 RX ORDER — LIDOCAINE HYDROCHLORIDE 10 MG/ML
20 INJECTION, SOLUTION INFILTRATION; PERINEURAL
Status: CANCELLED | OUTPATIENT
Start: 2019-01-13

## 2019-01-13 RX ORDER — ACETAMINOPHEN 325 MG/1
650 TABLET ORAL ONCE
Status: CANCELLED | OUTPATIENT
Start: 2019-01-13 | End: 2019-01-13

## 2019-01-13 RX ADMIN — DIPHENHYDRAMINE HCL 25 MG: 25 TABLET ORAL at 11:14

## 2019-01-13 RX ADMIN — SODIUM CHLORIDE 30 ML: 9 INJECTION, SOLUTION INTRAVENOUS at 11:14

## 2019-01-13 RX ADMIN — ACETAMINOPHEN 650 MG: 325 TABLET ORAL at 11:14

## 2019-01-13 ASSESSMENT — PAIN SCALES - GENERAL: PAINLEVEL_OUTOF10: 0

## 2019-01-15 ENCOUNTER — HOSPITAL ENCOUNTER (OUTPATIENT)
Facility: MEDICAL CENTER | Age: 24
End: 2019-01-16
Attending: SURGERY | Admitting: SURGERY
Payer: COMMERCIAL

## 2019-01-15 ENCOUNTER — APPOINTMENT (OUTPATIENT)
Dept: RADIOLOGY | Facility: MEDICAL CENTER | Age: 24
End: 2019-01-15
Attending: SURGERY
Payer: COMMERCIAL

## 2019-01-15 LAB
B-HCG FREE SERPL-ACNC: <5 MIU/ML
ERYTHROCYTE [DISTWIDTH] IN BLOOD BY AUTOMATED COUNT: 63.5 FL (ref 35.9–50)
HCG UR QL: NEGATIVE
HCT VFR BLD AUTO: 33.8 % (ref 37–47)
HGB BLD-MCNC: 11.9 G/DL (ref 12–16)
IHCGL IHCGL: NEGATIVE MIU/ML
MCH RBC QN AUTO: 30.6 PG (ref 27–33)
MCHC RBC AUTO-ENTMCNC: 35.2 G/DL (ref 33.6–35)
MCV RBC AUTO: 86.9 FL (ref 81.4–97.8)
PATHOLOGY CONSULT NOTE: NORMAL
PLATELET # BLD AUTO: 552 K/UL (ref 164–446)
PMV BLD AUTO: 10 FL (ref 9–12.9)
RBC # BLD AUTO: 3.89 M/UL (ref 4.2–5.4)
SP GR UR REFRACTOMETRY: 1.01
WBC # BLD AUTO: 9.8 K/UL (ref 4.8–10.8)

## 2019-01-15 PROCEDURE — 700102 HCHG RX REV CODE 250 W/ 637 OVERRIDE(OP)

## 2019-01-15 PROCEDURE — 501570 HCHG TROCAR, SEPARATOR: Performed by: SURGERY

## 2019-01-15 PROCEDURE — 160009 HCHG ANES TIME/MIN: Performed by: SURGERY

## 2019-01-15 PROCEDURE — 502571 HCHG PACK, LAP CHOLE: Performed by: SURGERY

## 2019-01-15 PROCEDURE — 700101 HCHG RX REV CODE 250

## 2019-01-15 PROCEDURE — 501571 HCHG TROCAR, SEPARATOR 12X100: Performed by: SURGERY

## 2019-01-15 PROCEDURE — G0378 HOSPITAL OBSERVATION PER HR: HCPCS

## 2019-01-15 PROCEDURE — 160028 HCHG SURGERY MINUTES - 1ST 30 MINS LEVEL 3: Performed by: SURGERY

## 2019-01-15 PROCEDURE — A9270 NON-COVERED ITEM OR SERVICE: HCPCS | Performed by: SURGERY

## 2019-01-15 PROCEDURE — 501583 HCHG TROCAR, THRD CAN&SEAL 5X100: Performed by: SURGERY

## 2019-01-15 PROCEDURE — 501838 HCHG SUTURE GENERAL: Performed by: SURGERY

## 2019-01-15 PROCEDURE — 96376 TX/PRO/DX INJ SAME DRUG ADON: CPT

## 2019-01-15 PROCEDURE — 500002 HCHG ADHESIVE, DERMABOND: Performed by: SURGERY

## 2019-01-15 PROCEDURE — 700111 HCHG RX REV CODE 636 W/ 250 OVERRIDE (IP): Performed by: ANESTHESIOLOGY

## 2019-01-15 PROCEDURE — 160036 HCHG PACU - EA ADDL 30 MINS PHASE I: Performed by: SURGERY

## 2019-01-15 PROCEDURE — 160039 HCHG SURGERY MINUTES - EA ADDL 1 MIN LEVEL 3: Performed by: SURGERY

## 2019-01-15 PROCEDURE — 501338 HCHG SHEARS, ENDO: Performed by: SURGERY

## 2019-01-15 PROCEDURE — 160002 HCHG RECOVERY MINUTES (STAT): Performed by: SURGERY

## 2019-01-15 PROCEDURE — 160048 HCHG OR STATISTICAL LEVEL 1-5: Performed by: SURGERY

## 2019-01-15 PROCEDURE — 85027 COMPLETE CBC AUTOMATED: CPT

## 2019-01-15 PROCEDURE — 81025 URINE PREGNANCY TEST: CPT

## 2019-01-15 PROCEDURE — A9270 NON-COVERED ITEM OR SERVICE: HCPCS | Performed by: ANESTHESIOLOGY

## 2019-01-15 PROCEDURE — 700102 HCHG RX REV CODE 250 W/ 637 OVERRIDE(OP): Performed by: SURGERY

## 2019-01-15 PROCEDURE — 96374 THER/PROPH/DIAG INJ IV PUSH: CPT

## 2019-01-15 PROCEDURE — 700102 HCHG RX REV CODE 250 W/ 637 OVERRIDE(OP): Performed by: ANESTHESIOLOGY

## 2019-01-15 PROCEDURE — 700101 HCHG RX REV CODE 250: Performed by: SURGERY

## 2019-01-15 PROCEDURE — 88304 TISSUE EXAM BY PATHOLOGIST: CPT

## 2019-01-15 PROCEDURE — A9270 NON-COVERED ITEM OR SERVICE: HCPCS

## 2019-01-15 PROCEDURE — 700111 HCHG RX REV CODE 636 W/ 250 OVERRIDE (IP): Performed by: SURGERY

## 2019-01-15 PROCEDURE — 700111 HCHG RX REV CODE 636 W/ 250 OVERRIDE (IP)

## 2019-01-15 PROCEDURE — 84702 CHORIONIC GONADOTROPIN TEST: CPT

## 2019-01-15 PROCEDURE — 160035 HCHG PACU - 1ST 60 MINS PHASE I: Performed by: SURGERY

## 2019-01-15 PROCEDURE — 501399 HCHG SPECIMAN BAG, ENDO CATC: Performed by: SURGERY

## 2019-01-15 RX ORDER — GABAPENTIN 300 MG/1
300 CAPSULE ORAL
Status: DISCONTINUED | OUTPATIENT
Start: 2019-01-15 | End: 2019-01-15 | Stop reason: HOSPADM

## 2019-01-15 RX ORDER — CITALOPRAM 20 MG/1
20 TABLET ORAL EVERY EVENING
COMMUNITY
End: 2019-03-12

## 2019-01-15 RX ORDER — TRAZODONE HYDROCHLORIDE 50 MG/1
50 TABLET ORAL NIGHTLY PRN
Status: DISCONTINUED | OUTPATIENT
Start: 2019-01-15 | End: 2019-01-16 | Stop reason: HOSPADM

## 2019-01-15 RX ORDER — SCOLOPAMINE TRANSDERMAL SYSTEM 1 MG/1
1 PATCH, EXTENDED RELEASE TRANSDERMAL
Status: DISCONTINUED | OUTPATIENT
Start: 2019-01-15 | End: 2019-01-16 | Stop reason: HOSPADM

## 2019-01-15 RX ORDER — KETOROLAC TROMETHAMINE 30 MG/ML
30 INJECTION, SOLUTION INTRAMUSCULAR; INTRAVENOUS EVERY 6 HOURS
Status: DISCONTINUED | OUTPATIENT
Start: 2019-01-15 | End: 2019-01-16 | Stop reason: HOSPADM

## 2019-01-15 RX ORDER — SODIUM CHLORIDE, SODIUM LACTATE, POTASSIUM CHLORIDE, CALCIUM CHLORIDE 600; 310; 30; 20 MG/100ML; MG/100ML; MG/100ML; MG/100ML
INJECTION, SOLUTION INTRAVENOUS ONCE
Status: COMPLETED | OUTPATIENT
Start: 2019-01-15 | End: 2019-01-15

## 2019-01-15 RX ORDER — MORPHINE SULFATE 4 MG/ML
4 INJECTION, SOLUTION INTRAMUSCULAR; INTRAVENOUS
Status: DISCONTINUED | OUTPATIENT
Start: 2019-01-15 | End: 2019-01-16 | Stop reason: HOSPADM

## 2019-01-15 RX ORDER — DIPHENHYDRAMINE HYDROCHLORIDE 50 MG/ML
12.5 INJECTION INTRAMUSCULAR; INTRAVENOUS
Status: DISCONTINUED | OUTPATIENT
Start: 2019-01-15 | End: 2019-01-15 | Stop reason: HOSPADM

## 2019-01-15 RX ORDER — HYDROMORPHONE HYDROCHLORIDE 1 MG/ML
0.4 INJECTION, SOLUTION INTRAMUSCULAR; INTRAVENOUS; SUBCUTANEOUS
Status: DISCONTINUED | OUTPATIENT
Start: 2019-01-15 | End: 2019-01-15 | Stop reason: HOSPADM

## 2019-01-15 RX ORDER — HYDROMORPHONE HYDROCHLORIDE 1 MG/ML
0.2 INJECTION, SOLUTION INTRAMUSCULAR; INTRAVENOUS; SUBCUTANEOUS
Status: DISCONTINUED | OUTPATIENT
Start: 2019-01-15 | End: 2019-01-15 | Stop reason: HOSPADM

## 2019-01-15 RX ORDER — HALOPERIDOL 5 MG/ML
1 INJECTION INTRAMUSCULAR
Status: DISCONTINUED | OUTPATIENT
Start: 2019-01-15 | End: 2019-01-15 | Stop reason: HOSPADM

## 2019-01-15 RX ORDER — FLUTICASONE PROPIONATE 50 MCG
1 SPRAY, SUSPENSION (ML) NASAL DAILY
COMMUNITY
End: 2019-08-15

## 2019-01-15 RX ORDER — OXYCODONE HYDROCHLORIDE 5 MG/1
5 TABLET ORAL
Status: DISCONTINUED | OUTPATIENT
Start: 2019-01-15 | End: 2019-01-16 | Stop reason: HOSPADM

## 2019-01-15 RX ORDER — SODIUM CHLORIDE, SODIUM LACTATE, POTASSIUM CHLORIDE, CALCIUM CHLORIDE 600; 310; 30; 20 MG/100ML; MG/100ML; MG/100ML; MG/100ML
INJECTION, SOLUTION INTRAVENOUS CONTINUOUS
Status: DISCONTINUED | OUTPATIENT
Start: 2019-01-15 | End: 2019-01-15 | Stop reason: HOSPADM

## 2019-01-15 RX ORDER — MEPERIDINE HYDROCHLORIDE 25 MG/ML
6.25 INJECTION INTRAMUSCULAR; INTRAVENOUS; SUBCUTANEOUS
Status: DISCONTINUED | OUTPATIENT
Start: 2019-01-15 | End: 2019-01-15 | Stop reason: HOSPADM

## 2019-01-15 RX ORDER — LIDOCAINE HYDROCHLORIDE 10 MG/ML
INJECTION, SOLUTION INFILTRATION; PERINEURAL
Status: COMPLETED
Start: 2019-01-15 | End: 2019-01-15

## 2019-01-15 RX ORDER — HYDROMORPHONE HYDROCHLORIDE 1 MG/ML
0.1 INJECTION, SOLUTION INTRAMUSCULAR; INTRAVENOUS; SUBCUTANEOUS
Status: DISCONTINUED | OUTPATIENT
Start: 2019-01-15 | End: 2019-01-15 | Stop reason: HOSPADM

## 2019-01-15 RX ORDER — MEDROXYPROGESTERONE ACETATE 150 MG/ML
150 INJECTION, SUSPENSION INTRAMUSCULAR
COMMUNITY
End: 2019-03-12

## 2019-01-15 RX ORDER — DEXTROSE MONOHYDRATE, SODIUM CHLORIDE, AND POTASSIUM CHLORIDE 50; 1.49; 4.5 G/1000ML; G/1000ML; G/1000ML
INJECTION, SOLUTION INTRAVENOUS CONTINUOUS
Status: DISPENSED | OUTPATIENT
Start: 2019-01-15 | End: 2019-01-15

## 2019-01-15 RX ORDER — DIPHENHYDRAMINE HYDROCHLORIDE 50 MG/ML
25 INJECTION INTRAMUSCULAR; INTRAVENOUS EVERY 6 HOURS PRN
Status: DISCONTINUED | OUTPATIENT
Start: 2019-01-15 | End: 2019-01-16 | Stop reason: HOSPADM

## 2019-01-15 RX ORDER — HALOPERIDOL 5 MG/ML
1 INJECTION INTRAMUSCULAR EVERY 6 HOURS PRN
Status: DISCONTINUED | OUTPATIENT
Start: 2019-01-15 | End: 2019-01-16 | Stop reason: HOSPADM

## 2019-01-15 RX ORDER — IBUPROFEN 200 MG
200 TABLET ORAL
COMMUNITY
End: 2019-08-15

## 2019-01-15 RX ORDER — ACETAMINOPHEN 500 MG
1000 TABLET ORAL EVERY 6 HOURS
Status: DISCONTINUED | OUTPATIENT
Start: 2019-01-15 | End: 2019-01-16 | Stop reason: HOSPADM

## 2019-01-15 RX ORDER — CITALOPRAM 20 MG/1
20 TABLET ORAL
Status: DISCONTINUED | OUTPATIENT
Start: 2019-01-15 | End: 2019-01-16 | Stop reason: HOSPADM

## 2019-01-15 RX ORDER — BUPIVACAINE HYDROCHLORIDE 5 MG/ML
INJECTION, SOLUTION PERINEURAL
Status: DISCONTINUED | OUTPATIENT
Start: 2019-01-15 | End: 2019-01-15 | Stop reason: HOSPADM

## 2019-01-15 RX ORDER — DEXAMETHASONE SODIUM PHOSPHATE 4 MG/ML
4 INJECTION, SOLUTION INTRA-ARTICULAR; INTRALESIONAL; INTRAMUSCULAR; INTRAVENOUS; SOFT TISSUE
Status: DISCONTINUED | OUTPATIENT
Start: 2019-01-15 | End: 2019-01-16 | Stop reason: HOSPADM

## 2019-01-15 RX ORDER — ONDANSETRON 2 MG/ML
4 INJECTION INTRAMUSCULAR; INTRAVENOUS
Status: DISCONTINUED | OUTPATIENT
Start: 2019-01-15 | End: 2019-01-15 | Stop reason: HOSPADM

## 2019-01-15 RX ORDER — ONDANSETRON 2 MG/ML
4 INJECTION INTRAMUSCULAR; INTRAVENOUS EVERY 4 HOURS PRN
Status: DISCONTINUED | OUTPATIENT
Start: 2019-01-15 | End: 2019-01-16 | Stop reason: HOSPADM

## 2019-01-15 RX ORDER — OXYCODONE HYDROCHLORIDE 10 MG/1
10 TABLET ORAL
Status: DISCONTINUED | OUTPATIENT
Start: 2019-01-15 | End: 2019-01-16 | Stop reason: HOSPADM

## 2019-01-15 RX ADMIN — KETOROLAC TROMETHAMINE 30 MG: 30 INJECTION, SOLUTION INTRAMUSCULAR; INTRAVENOUS at 23:04

## 2019-01-15 RX ADMIN — OXYCODONE HYDROCHLORIDE 10 MG: 10 TABLET ORAL at 21:45

## 2019-01-15 RX ADMIN — KETOROLAC TROMETHAMINE 30 MG: 30 INJECTION, SOLUTION INTRAMUSCULAR; INTRAVENOUS at 17:26

## 2019-01-15 RX ADMIN — ACETAMINOPHEN 1000 MG: 500 TABLET ORAL at 23:04

## 2019-01-15 RX ADMIN — LIDOCAINE HYDROCHLORIDE 0.5 ML: 10 INJECTION, SOLUTION INFILTRATION; PERINEURAL at 07:10

## 2019-01-15 RX ADMIN — SODIUM CHLORIDE, SODIUM LACTATE, POTASSIUM CHLORIDE, CALCIUM CHLORIDE: 600; 310; 30; 20 INJECTION, SOLUTION INTRAVENOUS at 07:13

## 2019-01-15 RX ADMIN — Medication 0.5 ML: at 07:10

## 2019-01-15 RX ADMIN — POTASSIUM CHLORIDE, DEXTROSE MONOHYDRATE AND SODIUM CHLORIDE: 150; 5; 450 INJECTION, SOLUTION INTRAVENOUS at 15:26

## 2019-01-15 RX ADMIN — CITALOPRAM HYDROBROMIDE 20 MG: 20 TABLET ORAL at 21:45

## 2019-01-15 RX ADMIN — HYDROCODONE BITARTRATE AND ACETAMINOPHEN 30 ML: 2.5; 108 SOLUTION ORAL at 09:21

## 2019-01-15 RX ADMIN — ACETAMINOPHEN 1000 MG: 500 TABLET ORAL at 17:26

## 2019-01-15 ASSESSMENT — PAIN SCALES - GENERAL
PAINLEVEL_OUTOF10: 0
PAINLEVEL_OUTOF10: 5
PAINLEVEL_OUTOF10: 7
PAINLEVEL_OUTOF10: 5
PAINLEVEL_OUTOF10: 5
PAINLEVEL_OUTOF10: 6
PAINLEVEL_OUTOF10: 7

## 2019-01-15 ASSESSMENT — LIFESTYLE VARIABLES: ALCOHOL_USE: NO

## 2019-01-15 ASSESSMENT — COGNITIVE AND FUNCTIONAL STATUS - GENERAL
MOBILITY SCORE: 24
DAILY ACTIVITIY SCORE: 24
SUGGESTED CMS G CODE MODIFIER DAILY ACTIVITY: CH
SUGGESTED CMS G CODE MODIFIER MOBILITY: CH

## 2019-01-15 ASSESSMENT — PATIENT HEALTH QUESTIONNAIRE - PHQ9
2. FEELING DOWN, DEPRESSED, IRRITABLE, OR HOPELESS: NOT AT ALL
SUM OF ALL RESPONSES TO PHQ9 QUESTIONS 1 AND 2: 0
1. LITTLE INTEREST OR PLEASURE IN DOING THINGS: NOT AT ALL

## 2019-01-15 NOTE — OP REPORT
DATE OF OPERATION: 1/15/2019    PREOPERATIVE DIAGNOSIS:   Right upper quadrant pain  Cholelithiasis  Sickle cell disease    POSTOPERATIVE DIAGNOSIS: Same.    PROCEDURE PERFORMED: Laparoscopic cholecystectomy.     SURGEON: Noel Greene    ASSISTANT: Colin Shearer    ANESTHESIOLOGIST:  Addi Harden MD..    ANESTHESIA: General endotracheal anesthesia.    INDICATIONS: The patient is a 23 y.o. female with a history and physical consistent with symptomatic gallbladder disease. The patient is taken to the operating room today for laparoscopic cholecystectomy.     FINDINGS: The gallbladder showed signs of stones.     SPECIMEN: Gallbladder with contents.    ESTIMATED BLOOD LOSS: 25 mL.    PROCEDURE:Informed consent was obtained pre-operatively.  The patient was taken back to the operating room and placed in supine position.  General endotracheal anesthesia was administered and the patient was intubated. Intravenous antibiotics were administered by the anesthesiologist in correct time interval. Sequential compression devices were placed. The patient's arms were tucked and all joints and skin surfaces were padded and protected appropriately. The abdomen was prepped and draped in a sterile fashion.  A time-out was performed and the patient and procedure were both verified.      Marcaine 0.5% without epinephrine was used to infiltrate the port sites. A 5 mm so-umbilical incision was made an Optiview technique was utilized with a 0 5 mm scope to enter the abdominal cavity. Carbon dioxide gas was applied to the port and pneumoperitoneum was achieved.  A 5  millimeter 0° laparoscope was placed through this port.  The abdominal contents were inspected noted to be free of injury  port placement.  The scope was exchanged for a 5 mm 30° laparoscope and a 10/11mm and two 5 mm ports were then placed in the epigastric region, right subcostal, and right lateral locations under directed visualization, respectively.  He patient was then  positioned and a left lateral decubitus with reverse Trendelenburg position    The gallbladder was identified, elevated, and retracted cephalad over the liver edge by the fundus to expose the infundibulum. Dissection was carried out within the hepatocystic triangle, definitively identifying the cystic duct and cystic artery. The cystic duct could be seen clearly terminating at the infundibulum.  The critical view of safety was achieved.      The cystic duct and artery were each clipped once proximally, twice distally, and divided. The gallbladder was dissected free from the undersurface of the liver using electrocautery and placed within an EndoCatch bag. The gallbladder was delivered intact from the abdominal cavity through the epigastric port site and submitted for pathology. The gallbladder fossa was inspected and hemostasis was noted to be satisfactory.     The epigastric port site fascia was closed with a 0 Vicryl suture using a suture passer. Once tied down, the fascia was noted to be tight and well approximated.    The ports were opened and the abdomen was allowed to deflate. All ports were then removed.  The port site skin incisions were closed with interrupted 4-0 Vicryl subcuticular sutures.    The patient tolerated the procedure well and there were no apparent complications. All sponge, sharps, and instrument counts were correct on 2 separate occasions. The patient was awakened, extubated, and transferred to the PACU in satisfactory condition.     Noel Greene MD PhD  [facility]  Colon and Rectal Surgeon  [phone]

## 2019-01-15 NOTE — CARE PLAN
Problem: Safety  Goal: Will remain free from injury  Outcome: PROGRESSING AS EXPECTED  Patient educated on the importance of calling a staff member before getting out of bed. Patient verbalizes understanding and patient calling appropriately. Bed in lowest position, call light and other belongings within reach, treaded socks on, and hourly rounding in place. Bed alarm off    Problem: Pain Management  Goal: Pain level will decrease to patient's comfort goal  Outcome: PROGRESSING AS EXPECTED  Pt receiving IVF until tolerating PO fluids.

## 2019-01-15 NOTE — OR NURSING
Parents called with pt status and room delay.  RN will call parents on cell phone when room assigned.

## 2019-01-15 NOTE — PROGRESS NOTES
Pt arrived to room 427-1 via wheelchair  Pt walked to bathroom with SBA and a steady gait.   Denies dizziness  AOx4  Pain rated 5/10 in abdomen- pt states that this is a comfortable level.     Pt arrived on 10 L oxy mask per ERAS protocol.   Lungs clear. Denies SOB.     Bowels hypoactive x4.   x4 lap sites assessed on the abdomen- all surgical incisions are well approximated and open to air with dermabond.     PIV in the left AC is running IVF    Pt started on a regular diet. Tolerating sips  Denies nausea    Denies numbness and tingling.     Pt asked to send her home medications with her family.     Pt oriented to the call light, change of shift times, visit times, bathroom location and vital sign frequency.   POC discussed and all questions answered

## 2019-01-15 NOTE — PROGRESS NOTES
Pre op assessment complete, pt's VSS, pt and family updated on POC. Call light within reach, pt denies any other needs at this time.     Blood sent to lab

## 2019-01-16 VITALS
WEIGHT: 121.25 LBS | TEMPERATURE: 99 F | HEART RATE: 65 BPM | RESPIRATION RATE: 18 BRPM | SYSTOLIC BLOOD PRESSURE: 103 MMHG | HEIGHT: 64 IN | DIASTOLIC BLOOD PRESSURE: 68 MMHG | BODY MASS INDEX: 20.7 KG/M2 | OXYGEN SATURATION: 93 %

## 2019-01-16 LAB
ALBUMIN SERPL BCP-MCNC: 3.9 G/DL (ref 3.2–4.9)
ALBUMIN/GLOB SERPL: 1.3 G/DL
ALP SERPL-CCNC: 42 U/L (ref 30–99)
ALT SERPL-CCNC: 42 U/L (ref 2–50)
ANION GAP SERPL CALC-SCNC: 9 MMOL/L (ref 0–11.9)
AST SERPL-CCNC: 54 U/L (ref 12–45)
BILIRUB SERPL-MCNC: 2.3 MG/DL (ref 0.1–1.5)
BUN SERPL-MCNC: 9 MG/DL (ref 8–22)
CALCIUM SERPL-MCNC: 9.1 MG/DL (ref 8.5–10.5)
CHLORIDE SERPL-SCNC: 107 MMOL/L (ref 96–112)
CO2 SERPL-SCNC: 23 MMOL/L (ref 20–33)
CREAT SERPL-MCNC: 0.54 MG/DL (ref 0.5–1.4)
ERYTHROCYTE [DISTWIDTH] IN BLOOD BY AUTOMATED COUNT: 59.5 FL (ref 35.9–50)
GLOBULIN SER CALC-MCNC: 2.9 G/DL (ref 1.9–3.5)
GLUCOSE SERPL-MCNC: 95 MG/DL (ref 65–99)
HCT VFR BLD AUTO: 27.8 % (ref 37–47)
HGB BLD-MCNC: 9.7 G/DL (ref 12–16)
MCH RBC QN AUTO: 30.5 PG (ref 27–33)
MCHC RBC AUTO-ENTMCNC: 34.9 G/DL (ref 33.6–35)
MCV RBC AUTO: 87.4 FL (ref 81.4–97.8)
PLATELET # BLD AUTO: 456 K/UL (ref 164–446)
PMV BLD AUTO: 9.7 FL (ref 9–12.9)
POTASSIUM SERPL-SCNC: 4 MMOL/L (ref 3.6–5.5)
PROT SERPL-MCNC: 6.8 G/DL (ref 6–8.2)
RBC # BLD AUTO: 3.18 M/UL (ref 4.2–5.4)
SODIUM SERPL-SCNC: 139 MMOL/L (ref 135–145)
WBC # BLD AUTO: 9.6 K/UL (ref 4.8–10.8)

## 2019-01-16 PROCEDURE — 85027 COMPLETE CBC AUTOMATED: CPT

## 2019-01-16 PROCEDURE — 36415 COLL VENOUS BLD VENIPUNCTURE: CPT

## 2019-01-16 PROCEDURE — 700102 HCHG RX REV CODE 250 W/ 637 OVERRIDE(OP): Performed by: SURGERY

## 2019-01-16 PROCEDURE — 700111 HCHG RX REV CODE 636 W/ 250 OVERRIDE (IP): Performed by: SURGERY

## 2019-01-16 PROCEDURE — A9270 NON-COVERED ITEM OR SERVICE: HCPCS | Performed by: SURGERY

## 2019-01-16 PROCEDURE — G0378 HOSPITAL OBSERVATION PER HR: HCPCS

## 2019-01-16 PROCEDURE — 96376 TX/PRO/DX INJ SAME DRUG ADON: CPT

## 2019-01-16 PROCEDURE — 80053 COMPREHEN METABOLIC PANEL: CPT

## 2019-01-16 RX ADMIN — KETOROLAC TROMETHAMINE 30 MG: 30 INJECTION, SOLUTION INTRAMUSCULAR; INTRAVENOUS at 12:13

## 2019-01-16 RX ADMIN — ACETAMINOPHEN 1000 MG: 500 TABLET ORAL at 12:13

## 2019-01-16 RX ADMIN — ACETAMINOPHEN 1000 MG: 500 TABLET ORAL at 06:21

## 2019-01-16 RX ADMIN — KETOROLAC TROMETHAMINE 30 MG: 30 INJECTION, SOLUTION INTRAMUSCULAR; INTRAVENOUS at 06:21

## 2019-01-16 ASSESSMENT — PAIN SCALES - GENERAL
PAINLEVEL_OUTOF10: 5
PAINLEVEL_OUTOF10: 4
PAINLEVEL_OUTOF10: 3

## 2019-01-16 ASSESSMENT — LIFESTYLE VARIABLES: EVER_SMOKED: NEVER

## 2019-01-16 NOTE — PROGRESS NOTES
"S:  23 y.o.female s/p laparoscopic cholecystectomy  POD# 1.  Patient did well overnight.  Denies any pain.  She did not require any narcotic administration.  She is tolerating a diet and ambulating without difficulty.  She wants to go home.    O:  Blood pressure 103/68, pulse 65, temperature 37.2 °C (99 °F), temperature source Temporal, resp. rate 18, height 1.626 m (5' 4\"), weight 55 kg (121 lb 4.1 oz), last menstrual period 09/01/2018, SpO2 93 %, not currently breastfeeding.  I/O last 3 completed shifts:  In: 1940 [P.O.:640; I.V.:1300]  Out: 10   Recent Labs      01/16/19   0430   SODIUM  139   POTASSIUM  4.0   CHLORIDE  107   CO2  23   GLUCOSE  95   BUN  9   CREATININE  0.54   CALCIUM  9.1     Recent Labs      01/15/19   0712  01/16/19   0430   WBC  9.8  9.6   RBC  3.89*  3.18*   HEMOGLOBIN  11.9*  9.7*   HEMATOCRIT  33.8*  27.8*   MCV  86.9  87.4   MCH  30.6  30.5   MCHC  35.2*  34.9   RDW  63.5*  59.5*   PLATELETCT  552*  456*   MPV  10.0  9.7       Alert and Oriented x3, No Acute Distress  Normal Respiratory Effort  Abdomen soft, appropriately tender  Incisions/Bandages clean/dry/intact  Extremities warm and well perfused    A/P:done well  Patient will be discharged to home.  She did not require narcotic prescriptions.  She was given discharge instructions.  She has a postoperative appointment already made.  Noel Greene MD PhD  [facility]  Colon and Rectal Surgeon  [phone]  "

## 2019-01-16 NOTE — DISCHARGE INSTRUCTIONS
Laparoscopic Cholecystectomy Discharge Instructions:     1. DIET: Upon discharge from the hospital you may resume your normal preoperative diet. Depending on how you are feeling and whether you have nausea or not, you may wish to stay with a bland diet for the first few days. However, you can advance this as quickly as you feel ready.     2. ACTIVITIES: You have a 10 pound weight restriction for two weeks after surgery. This means no lifting anything heavier than a gallon of milk. Routine activities such as bathing, walking, going up and down stairs, and driving* (see below) are safe.  Avoid strenuous activities and exercise that involves twisting, bending, and, running.     3. DRIVING: You may drive whenever you are off pain medications and are able to perform the activities needed to drive, i.e. turning, bending, twisting, wearing a seat belt, etc.     4. BATHING: You may get the wound wet at any time after leaving the hospital. You may shower, but do not submerge in a bath or a pool for at least a week.     5. BOWEL FUNCTION: A few patients, after this operation, will develop either frequent or loose stools after meals. This usually corrects itself after a few days, to a few months.     Much more common than loose stools, is constipation. The combination of pain medication and decreased activity after surgery can cause constipation in otherwise normal patients. If you feel this is occurring, take an over the counter laxative (Milk of Magnesia, Ex-Lax, Senokot, Miralax, Magnesium Citrate, etc.) until the problem has resolved.     6. PAIN MEDICATION: You will be given a prescription for pain medication at discharge. Please take these as directed. It is important to remember not to take medications on an empty stomach as this may cause nausea.  Wean the use of pain medication as tolerated as soon as possible.     7.CALL IF YOU HAVE: (1) Fevers to more than 101F, (2) Unusual chest or leg pain, (3) Drainage or fluid  from incision that may be foul smelling, increased tenderness or soreness at the wound or the wound edges are no longer together, redness or swelling at the incision site. Please do not hesitate to call with any other questions.     8. APPOINTMENT: Contact our office at [phone] for a follow-up appointment in 1 to 2 weeks following your procedure.     If you have any additional questions, please do not hesitate to call the office and speak to either myself or the physician on call.     Office address:   [address] [place], [place] [address]e [facility], [address] [place] [address]   Noel Greene MD PhD   [facility]   Colon and Rectal Surgeon   [phone]     Discharge Instructions    Discharged to home by car with relative. Discharged via wheelchair, hospital escort: Yes.  Special equipment needed: Not Applicable    Be sure to schedule a follow-up appointment with your primary care doctor or any specialists as instructed.     Discharge Plan:   Diet Plan: Discussed  Activity Level: Discussed  Confirmed Follow up Appointment: Patient to Call and Schedule Appointment  Confirmed Symptoms Management: Discussed  Medication Reconciliation Updated: Yes  Pneumococcal Vaccine Administered/Refused: Not given - Patient refused pneumococcal vaccine  Influenza Vaccine Indication: Not indicated: Previously immunized this influenza season and > 8 years of age    I understand that a diet low in cholesterol, fat, and sodium is recommended for good health. Unless I have been given specific instructions below for another diet, I accept this instruction as my diet prescription.   Other diet: diet as tolerated    Special Instructions: None    · Is patient discharged on Warfarin / Coumadin?   No     Depression / Suicide Risk    As you are discharged from this [facility] facility, it is important to learn how to keep safe from harming yourself.    Recognize the warning signs:  · Abrupt changes in personality, positive or negative- including increase in  energy   · Giving away possessions  · Change in eating patterns- significant weight changes-  positive or negative  · Change in sleeping patterns- unable to sleep or sleeping all the time   · Unwillingness or inability to communicate  · Depression  · Unusual sadness, discouragement and loneliness  · Talk of wanting to die  · Neglect of personal appearance   · Rebelliousness- reckless behavior  · Withdrawal from people/activities they love  · Confusion- inability to concentrate     If you or a loved one observes any of these behaviors or has concerns about self-harm, here's what you can do:  · Talk about it- your feelings and reasons for harming yourself  · Remove any means that you might use to hurt yourself (examples: pills, rope, extension cords, firearm)  · Get professional help from the community (Mental Health, Substance Abuse, psychological counseling)  · Do not be alone:Call your Safe Contact- someone whom you trust who will be there for you.  · Call your local CRISIS HOTLINE 825-3709 or 999-627-6147  · Call your local Children's Mobile Crisis Response Team [place] (342) 058-6858 or www.[employer] Mobile  · Call the toll free National Suicide Prevention Hotlines   · National Suicide Prevention Lifeline 432-027-CWLE (5044)  · National Hope Line Network 800-SUICIDE (857-8092)

## 2019-01-16 NOTE — DIETARY
"Nutrition services: Day 0 of admit.  Alicia Tejada is a 23 y.o. female with admitting DX of Cholelithiasis, Biliary calculus.    Poor PO noted on admit screen. Attempted to speak with pt at bedside, however pt was resting and did not rouse to name.  Per chart review, PO improved to % for most recent meal.    Assessment:  Height: 162.6 cm (5' 4\")  Weight: 55 kg (121 lb 4.1 oz)  Body mass index is 20.81 kg/m².  Diet/Intake: Regular    Evaluation:   1. Lap trev 1/15.    Recommendations/Plan:  1. Encourage intake of meals.  2. Document intake of all meals as % taken in ADLs to provide interdisciplinary communication across all shifts.   3. Monitor weight.  4. Nutrition rep will continue to see patient for ongoing meal and snack preferences.             "

## 2019-01-16 NOTE — CARE PLAN
Problem: Knowledge Deficit  Goal: Knowledge of disease process/condition, treatment plan, diagnostic tests, and medications will improve  Plan of care for shift discussed with pt.    Problem: Discharge Barriers/Planning  Goal: Patient's continuum of care needs will be met  Anticipate dc home today with family

## 2019-01-16 NOTE — PROGRESS NOTES
Assumed care of patient. Report received. Assessment complete.  All questions and concerns addressed.

## 2019-01-17 NOTE — PROGRESS NOTES
Discharging Patient home per physician order.  Discharged with family--left ambulating to vehicle with steady gait.  Demonstrated understanding of discharge instructions, follow up appointments, home medications, prescriptions, home care for surgical wound, and nursing care instructions for activity and diet.  Ambulating without assistance, voiding without difficulty, pain well controlled, tolerating oral medications, oxygen saturation greater than 90% , tolerating diet.   Educational handouts re: worsening symptoms and md instructions given and discussed.  Verbalized understanding of discharge instructions and educational handouts.  All questions answered.  Belongings with patient at time of discharge.

## 2019-01-29 NOTE — TELEPHONE ENCOUNTER
"2nd attempt:    Called and spoke with patient regarding to schedule a follow up hematology appointment with our office. Patient stated \"If you do not mind I will call you in about another month, I do not have my work schedule with me and I believe Dr. Garcia would like to see me sometime in April.\" I verified with patient Dr. Garcia would like to see patient in march. Patient stated \"Okay, I will call as soon as I get my schedule.\" I verbalized understanding.  "

## 2019-02-26 ENCOUNTER — OFFICE VISIT (OUTPATIENT)
Dept: URGENT CARE | Facility: MEDICAL CENTER | Age: 24
End: 2019-02-26
Payer: COMMERCIAL

## 2019-02-26 VITALS
HEART RATE: 86 BPM | DIASTOLIC BLOOD PRESSURE: 70 MMHG | SYSTOLIC BLOOD PRESSURE: 98 MMHG | HEIGHT: 64 IN | BODY MASS INDEX: 20.66 KG/M2 | WEIGHT: 121 LBS | TEMPERATURE: 98.8 F | OXYGEN SATURATION: 100 % | RESPIRATION RATE: 16 BRPM

## 2019-02-26 DIAGNOSIS — M62.830 LUMBAR PARASPINAL MUSCLE SPASM: ICD-10-CM

## 2019-02-26 DIAGNOSIS — M54.50 ACUTE LEFT-SIDED LOW BACK PAIN WITHOUT SCIATICA: ICD-10-CM

## 2019-02-26 DIAGNOSIS — J00 COMMON COLD: ICD-10-CM
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
## 2019-02-26 PROCEDURE — 99214 OFFICE O/P EST MOD 30 MIN: CPT | Performed by: PHYSICIAN ASSISTANT

## 2019-02-26 RX ORDER — CYCLOBENZAPRINE HCL 10 MG
10 TABLET ORAL 3 TIMES DAILY PRN
Qty: 30 TAB | Refills: 0 | Status: SHIPPED | OUTPATIENT
Start: 2019-02-26 | End: 2019-08-15

## 2019-02-26 ASSESSMENT — ENCOUNTER SYMPTOMS
CHILLS: 1
COUGH: 1
BACK PAIN: 1
SENSORY CHANGE: 0
FOCAL WEAKNESS: 0
SORE THROAT: 1
FEVER: 1
TINGLING: 0
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
## 2019-02-26 NOTE — PATIENT INSTRUCTIONS
Lumbosacral Strain  Lumbosacral strain is an injury that causes pain in the lower back (lumbosacral spine). This injury usually occurs from overstretching the muscles or ligaments along your spine. A strain can affect one or more muscles or cord-like tissues that connect bones to other bones (ligaments).  What are the causes?  This condition may be caused by:  · A hard, direct hit (blow) to the back.  · Excessive stretching of the lower back muscles. This may result from:  ¨ A fall.  ¨ Lifting something heavy.  ¨ Repetitive movements such as bending or crouching.  What increases the risk?  The following factors may increase your risk of getting this condition:  · Participating in sports or activities that involve:  ¨ A sudden twist of the back.  ¨ Pushing or pulling motions.  · Being overweight or obese.  · Having poor strength and flexibility, especially tight hamstrings or weak muscles in the back or abdomen.  · Having too much of a curve in the lower back.  · Having a pelvis that is tilted forward.  What are the signs or symptoms?  The main symptom of this condition is pain in the lower back, at the site of the strain. Pain may extend (radiate) down one or both legs.  How is this diagnosed?  This condition is diagnosed based on:  · Your symptoms.  · Your medical history.  · A physical exam.  ¨ Your health care provider may push on certain areas of your back to determine the source of your pain.  ¨ You may be asked to bend forward, backward, and side to side to assess the severity of your pain and your range of motion.  · Imaging tests, such as:  ¨ X-rays.  ¨ MRI.  How is this treated?  Treatment for this condition may include:  · Putting heat and cold on the affected area.  · Medicines to help relieve pain and relax your muscles (muscle relaxants).  · NSAIDs to help reduce swelling and discomfort.  When your symptoms improve, it is important to gradually return to your normal routine as soon as possible to reduce  pain, avoid stiffness, and avoid loss of muscle strength. Generally, symptoms should improve within 6 weeks of treatment. However, recovery time varies.  Follow these instructions at home:  Managing pain, stiffness, and swelling  · If directed, put ice on the injured area during the first 24 hours after your strain.  ¨ Put ice in a plastic bag.  ¨ Place a towel between your skin and the bag.  ¨ Leave the ice on for 20 minutes, 2-3 times a day.  · If directed, put heat on the affected area as often as told by your health care provider. Use the heat source that your health care provider recommends, such as a moist heat pack or a heating pad.  ¨ Place a towel between your skin and the heat source.  ¨ Leave the heat on for 20-30 minutes.  ¨ Remove the heat if your skin turns bright red. This is especially important if you are unable to feel pain, heat, or cold. You may have a greater risk of getting burned.  Activity  · Rest and return to your normal activities as told by your health care provider. Ask your health care provider what activities are safe for you.  · Avoid activities that take a lot of energy for as long as told by your health care provider.  General instructions  · Take over-the-counter and prescription medicines only as told by your health care provider.  · Do not drive or use heavy machinery while taking prescription pain medicine.  · Do not use any products that contain nicotine or tobacco, such as cigarettes and e-cigarettes. If you need help quitting, ask your health care provider.  · Keep all follow-up visits as told by your health care provider. This is important.  How is this prevented?  · Use correct form when playing sports and lifting heavy objects.  · Use good posture when sitting and standing.  · Maintain a healthy weight.  · Sleep on a mattress with medium firmness to support your back.  · Be safe and responsible while being active to avoid falls.  · Do at least 150 minutes of  moderate-intensity exercise each week, such as brisk walking or water aerobics. Try a form of exercise that takes stress off your back, such as swimming or stationary cycling.  · Maintain physical fitness, including:  ¨ Strength.  ¨ Flexibility.  ¨ Cardiovascular fitness.  ¨ Endurance.  Contact a health care provider if:  · Your back pain does not improve after 6 weeks of treatment.  · Your symptoms get worse.  Get help right away if:  · Your back pain is severe.  · You cannot stand or walk.  · You have difficulty controlling when you urinate or when you have a bowel movement.  · You feel nauseous or you vomit.  · Your feet get very cold.  · You have numbness, tingling, weakness, or problems using your arms or legs.  · You develop any of the following:  ¨ Shortness of breath.  ¨ Dizziness.  ¨ Pain in your legs.  ¨ Weakness in your buttocks or legs.  ¨ Discoloration of the skin on your toes or legs.  This information is not intended to replace advice given to you by your health care provider. Make sure you discuss any questions you have with your health care provider.  Document Released: 09/27/2006 Document Revised: 07/07/2017 Document Reviewed: 05/21/2017  [employer] Interactive Patient Education © 2017 [employer]

## 2019-03-01 NOTE — ADDENDUM NOTE
Encounter addended by: Noel Greene M.D. on: 3/1/2019  2:55 PM<BR>    Actions taken: Sign clinical note

## 2019-03-12 ENCOUNTER — OFFICE VISIT (OUTPATIENT)
Dept: MEDICAL GROUP | Facility: PHYSICIAN GROUP | Age: 24
End: 2019-03-12
Payer: COMMERCIAL

## 2019-03-12 VITALS
HEIGHT: 64 IN | TEMPERATURE: 98.3 F | WEIGHT: 118 LBS | OXYGEN SATURATION: 93 % | HEART RATE: 66 BPM | RESPIRATION RATE: 16 BRPM | SYSTOLIC BLOOD PRESSURE: 118 MMHG | BODY MASS INDEX: 20.14 KG/M2 | DIASTOLIC BLOOD PRESSURE: 82 MMHG

## 2019-03-12 DIAGNOSIS — Z30.42 ENCOUNTER FOR SURVEILLANCE OF INJECTABLE CONTRACEPTIVE: ICD-10-CM

## 2019-03-12 DIAGNOSIS — F41.9 ANXIETY: ICD-10-CM

## 2019-03-12 DIAGNOSIS — M62.830 LUMBAR PARASPINAL MUSCLE SPASM: ICD-10-CM

## 2019-03-12 PROCEDURE — 96372 THER/PROPH/DIAG INJ SC/IM: CPT | Performed by: PHYSICIAN ASSISTANT

## 2019-03-12 PROCEDURE — 99214 OFFICE O/P EST MOD 30 MIN: CPT | Mod: 25 | Performed by: PHYSICIAN ASSISTANT

## 2019-03-12 RX ORDER — MEDROXYPROGESTERONE ACETATE 150 MG/ML
150 INJECTION, SUSPENSION INTRAMUSCULAR ONCE
Status: COMPLETED | OUTPATIENT
Start: 2019-03-12 | End: 2019-03-12

## 2019-03-12 RX ORDER — CITALOPRAM 40 MG/1
40 TABLET ORAL DAILY
Qty: 30 TAB | Refills: 6 | Status: SHIPPED | OUTPATIENT
Start: 2019-03-12 | End: 2019-10-28 | Stop reason: SDUPTHER

## 2019-03-12 RX ADMIN — MEDROXYPROGESTERONE ACETATE 150 MG: 150 INJECTION, SUSPENSION INTRAMUSCULAR at 11:30
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
## 2019-03-28 ENCOUNTER — APPOINTMENT (OUTPATIENT)
Dept: HEMATOLOGY ONCOLOGY | Facility: MEDICAL CENTER | Age: 24
End: 2019-03-28
Payer: COMMERCIAL

## 2019-04-10 ENCOUNTER — OFFICE VISIT (OUTPATIENT)
Dept: MEDICAL GROUP | Facility: PHYSICIAN GROUP | Age: 24
End: 2019-04-10
Payer: COMMERCIAL

## 2019-04-10 VITALS
WEIGHT: 124 LBS | HEIGHT: 64 IN | OXYGEN SATURATION: 99 % | TEMPERATURE: 99.2 F | BODY MASS INDEX: 21.17 KG/M2 | DIASTOLIC BLOOD PRESSURE: 62 MMHG | RESPIRATION RATE: 16 BRPM | HEART RATE: 69 BPM | SYSTOLIC BLOOD PRESSURE: 100 MMHG

## 2019-04-10 DIAGNOSIS — F41.9 ANXIETY: ICD-10-CM

## 2019-04-10 DIAGNOSIS — F51.01 PRIMARY INSOMNIA: ICD-10-CM

## 2019-04-10 PROCEDURE — 99214 OFFICE O/P EST MOD 30 MIN: CPT | Performed by: PHYSICIAN ASSISTANT

## 2019-04-10 RX ORDER — HYDROXYZINE HYDROCHLORIDE 25 MG/1
TABLET, FILM COATED ORAL
Qty: 60 TAB | Refills: 6 | Status: SHIPPED | OUTPATIENT
Start: 2019-04-10 | End: 2020-05-06 | Stop reason: SDUPTHER

## 2019-04-11 NOTE — PROGRESS NOTES
Chief Complaint   Patient presents with   • Follow-Up     anxiety       HISTORY OF PRESENT ILLNESS: Alicia Tejada is an established 23 y.o. female here to discuss the evaluation and management of:      Patient is a pleasant 23-year-old female here today follow-up on anxiety.  During her last appointment on 3/12/2019 increased Celexa dosage from 20 mg tab once daily to 40 mg tab once daily.  She tells me since dosage increase panic attacks have resolved but she still feeling anxious at least 3 times a week.  She states that anxiety symptoms feel exacerbated but tells me since dosage increase overall mood has improved.  She mentions that she is also been sweating more at night since dosage increase.  States since her last appointment she has developed a regular exercise routine.  She tells me that exercise routine consist of strength training 3-4 times a week 60 minutes per time.  States she suffers from sleep deprivation.  She tells me that she goes to bed at the same time and wakes up the same time.  States on average she will wake up 3-4 times per night and during that time will be awake 15-20 minutes per time.  Patient denies homicidal or suicidal ideation.      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    Current Outpatient Prescriptions   Medication Sig Dispense Refill   • hydrOXYzine HCl (ATARAX) 25 MG Tab Take 1 tab by mouth once nightly as needed. 60 Tab 6   • citalopram (CELEXA) 40 MG Tab Take 1 Tab by mouth every day. 30 Tab 6   • cyclobenzaprine (FLEXERIL) 10 MG Tab Take 1 Tab by  mouth 3 times a day as needed. 30 Tab 0   • ibuprofen (MOTRIN) 200 MG Tab Take 200 mg by mouth 1 time daily as needed.     • fluticasone (FLONASE) 50 MCG/ACT nasal spray Spray 1 Spray in nose every day.     • albuterol 108 (90 Base) MCG/ACT Aero Soln inhalation aerosol Inhale 2 Puffs by mouth every 6 hours as needed for Shortness of Breath.     • cetirizine (ZYRTEC) 10 MG Tab Take 10 mg by mouth 1 time daily as needed.       No current facility-administered medications for this visit.        Social History   Substance Use Topics   • Smoking status: Never Smoker   • Smokeless tobacco: Never Used   • Alcohol use Yes      Comment: 1 drink/week       Family Status   Relation Status   • Mo Alive   • Fa Alive   • Bro Alive   • MGMo    • MGFa    • PGMo Alive   • PGFa Alive   • Bro Alive   • Bro Alive   • MUnc Alive     Family History   Problem Relation Age of Onset   • No Known Problems Mother    • No Known Problems Father    • No Known Problems Brother    • Diabetes Maternal Grandmother         Type II   • Hypertension Maternal Grandmother    • Diabetes Paternal Grandmother         Type II   • Hypertension Paternal Grandmother    • No Known Problems Brother    • GI Brother         GI Ulcers   • Other Maternal Uncle         Sickle Cell        ROS:  Review of Systems   Constitutional: Negative for fever, chills, weight loss and malaise/fatigue.   HENT: Negative for ear pain, nosebleeds, congestion, sore throat and neck pain.    Eyes: Negative for blurred vision.   Respiratory: Negative for cough, sputum production, shortness of breath and wheezing.    Cardiovascular: Negative for chest pain, palpitations, orthopnea and leg swelling.   Gastrointestinal: Negative for heartburn, nausea, vomiting and abdominal pain.   Genitourinary: Negative for dysuria, urgency and frequency.   Musculoskeletal: Negative for myalgias, back pain and joint pain.   Skin: Negative for rash and itching.   Neurological: Negative for  "dizziness, tingling, tremors, sensory change, focal weakness and headaches.   Endo/Heme/Allergies: Does not bruise/bleed easily.  Positive for diaphoresis.  Psychiatric/Behavioral: Negative for depression, suicidal ideas and memory loss.  The patient is nervous/anxious and does have insomnia.    All other systems reviewed and are negative except as in HPI.    Exam: /62   Pulse 69   Temp 37.3 °C (99.2 °F)   Resp 16   Ht 1.626 m (5' 4\")   Wt 56.2 kg (124 lb)   SpO2 99%  Body mass index is 21.28 kg/m².  General: Normal appearing. No distress.  HEENT: Normocephalic. Eyes conjunctiva clear lids without ptosis, ears normal shape and contour.  Neck: Supple without JVD or bruit. Thyroid is not enlarged.  Pulmonary: Clear to ausculation.  Normal effort. No rales, ronchi, or wheezing.  Cardiovascular: Regular rate and rhythm without murmur.   Abdomen: Soft, nontender, nondistended.   Neurologic: Grossly nonfocal.  Cranial nerves are normal.   Lymph: No cervical, supraclavicular or axillary lymph nodes are palpable  Skin: Warm and dry.  No rashes or suspicious skin lesions.  Musculoskeletal: Normal gait. No extremity cyanosis, clubbing, or edema.  Psych: Normal mood and affect. Alert and oriented x3. Judgment and insight is normal.    Medical decision-making and discussion:  1. Primary insomnia  Patient has been prescribed hydroxyzine 25 mg tab advised take 1 tab by mouth once nightly as needed.  Discussed common side effects and adverse reactions of medication with patient.  Advised patient if she finds 25 mg tab once nightly does not help alleviate sleep deprivation symptoms that she can take 2 tabs by mouth once nightly.  Continue working on diet, exercise, and sleep hygiene.  Continue work on hydration.  Continue work on coping mechanisms for stress and anxiety.    Follow-up for worsening symptoms,lack of expected recovery, or should new symptoms or problems arise.      - hydrOXYzine HCl (ATARAX) 25 MG Tab; " Take 1 tab by mouth once nightly as needed.  Dispense: 60 Tab; Refill: 6    2. Anxiety  No adjustments were made to Celexa during today's appointment.  Patient will make a follow-up appointment in 1-2 months if symptoms do not improve at current dosage.  Discussed with patient a side effect of Celexa is diaphoresis.  Follow-up if this becomes problematic.  Continue work on diet, exercise, sleep hygiene.  Continue working on coping mechanisms for stress and anxiety.  Continue to monitor.    Denies any suicidal or homicidal ideation. Discussed that should the patient have any symptoms they should call suicide prevention hotline or report to the emergency room immediately.        Please note that this dictation was created using voice recognition software. I have made every reasonable attempt to correct obvious errors, but I expect that there are errors of grammar and possibly content that I did not discover before finalizing the note.      Return if symptoms worsen or fail to improve.

## 2019-04-29 ENCOUNTER — TELEPHONE (OUTPATIENT)
Dept: HEMATOLOGY ONCOLOGY | Facility: MEDICAL CENTER | Age: 24
End: 2019-04-29

## 2019-04-29 NOTE — TELEPHONE ENCOUNTER
1st attempt:     Left message in regards to the patients missed hematology appointment today with Dr. Radha clarko hematology, post surgery

## 2019-05-07 NOTE — TELEPHONE ENCOUNTER
Patient states she does not wish to reschedule at this time. She informs me she received the letter informing her Dr. Garcia is returning to [facility]. She will contact our office on a as needed basis unless she decides to seek treatment elsewhere.

## 2019-05-09 ENCOUNTER — TELEPHONE (OUTPATIENT)
Dept: MEDICAL GROUP | Facility: MEDICAL CENTER | Age: 24
End: 2019-05-09

## 2019-05-09 DIAGNOSIS — J30.1 SEASONAL ALLERGIC RHINITIS DUE TO POLLEN: ICD-10-CM

## 2019-05-09 RX ORDER — MONTELUKAST SODIUM 10 MG/1
10 TABLET ORAL DAILY
Qty: 30 TAB | Refills: 5 | Status: SHIPPED | OUTPATIENT
Start: 2019-05-09 | End: 2019-05-10 | Stop reason: SDUPTHER

## 2019-05-09 NOTE — TELEPHONE ENCOUNTER
Patient works as a medical assistant with me.  She has been having significant postnasal drainage and now has hoarseness of voice.  She has been taking Zyrtec at night and using Flonase twice daily without significant improvement of current symptoms.  She does use Benadryl at night, which is helpful for sleep.  She has known significant seasonal allergies.  Patient denies any fever or chest pain or wheezing or shortness of breath.    Prescription for Singulair 10 mg sent to [facility] pharmacy to add to her current regimen.    Brad Perry M.D.

## 2019-05-10 RX ORDER — MONTELUKAST SODIUM 10 MG/1
10 TABLET ORAL DAILY
Qty: 30 TAB | Refills: 5 | Status: SHIPPED | OUTPATIENT
Start: 2019-05-10 | End: 2021-09-03

## 2019-05-15 ENCOUNTER — PATIENT MESSAGE (OUTPATIENT)
Dept: MEDICAL GROUP | Facility: PHYSICIAN GROUP | Age: 24
End: 2019-05-15

## 2019-05-15 DIAGNOSIS — R61 EXCESSIVE SWEATING: ICD-10-CM

## 2019-06-28 ENCOUNTER — PATIENT MESSAGE (OUTPATIENT)
Dept: MEDICAL GROUP | Facility: PHYSICIAN GROUP | Age: 24
End: 2019-06-28

## 2019-06-28 NOTE — TELEPHONE ENCOUNTER
----- Message from Alicia Tejada sent at 6/28/2019 10:05 AM PDT -----  Regarding: Non-Urgent Medical Question  Contact: [phone]  Hello,    I was wondering if I could have a referral for behavioral health to talk to someone about my anxiety. Please let me know if I need to schedule an appointment. Thank you!    Alicia

## 2019-07-02 DIAGNOSIS — F41.9 ANXIETY: ICD-10-CM

## 2019-07-16 ENCOUNTER — HOSPITAL ENCOUNTER (OUTPATIENT)
Dept: LAB | Facility: MEDICAL CENTER | Age: 24
End: 2019-07-16
Attending: NURSE PRACTITIONER
Payer: COMMERCIAL

## 2019-07-16 ENCOUNTER — HOSPITAL ENCOUNTER (OUTPATIENT)
Dept: LAB | Facility: MEDICAL CENTER | Age: 24
End: 2019-07-16
Payer: COMMERCIAL

## 2019-07-16 DIAGNOSIS — R61 EXCESSIVE SWEATING: ICD-10-CM

## 2019-07-16 LAB
BDY FAT % MEASURED: 21.3 %
BP DIAS: 67 MMHG
BP SYS: 112 MMHG
CHOLEST SERPL-MCNC: 135 MG/DL (ref 100–199)
DIABETES HTDIA: NO
EVENT NAME HTEVT: NORMAL
FASTING HTFAS: YES
GLUCOSE SERPL-MCNC: 55 MG/DL (ref 65–99)
HDLC SERPL-MCNC: 39 MG/DL
HYPERTENSION HTHYP: NO
LDLC SERPL CALC-MCNC: 77 MG/DL
SCREENING LOC CITY HTCIT: NORMAL
SCREENING LOC STATE HTSTA: NORMAL
SCREENING LOCATION HTLOC: NORMAL
SMOKING HTSMO: NO
SUBSCRIBER ID HTSID: NORMAL
T4 FREE SERPL-MCNC: 0.63 NG/DL (ref 0.53–1.43)
TRIGL SERPL-MCNC: 93 MG/DL (ref 0–149)
TSH SERPL DL<=0.005 MIU/L-ACNC: 1.83 UIU/ML (ref 0.38–5.33)

## 2019-07-16 PROCEDURE — 36415 COLL VENOUS BLD VENIPUNCTURE: CPT

## 2019-07-16 PROCEDURE — S5190 WELLNESS ASSESSMENT BY NONPH: HCPCS

## 2019-07-16 PROCEDURE — 84439 ASSAY OF FREE THYROXINE: CPT

## 2019-07-16 PROCEDURE — 82947 ASSAY GLUCOSE BLOOD QUANT: CPT

## 2019-07-16 PROCEDURE — 80061 LIPID PANEL: CPT

## 2019-07-16 PROCEDURE — 84443 ASSAY THYROID STIM HORMONE: CPT

## 2019-08-15 ENCOUNTER — OFFICE VISIT (OUTPATIENT)
Dept: MEDICAL GROUP | Facility: PHYSICIAN GROUP | Age: 24
End: 2019-08-15
Payer: COMMERCIAL

## 2019-08-15 ENCOUNTER — HOSPITAL ENCOUNTER (OUTPATIENT)
Dept: LAB | Facility: MEDICAL CENTER | Age: 24
End: 2019-08-15
Attending: PHYSICIAN ASSISTANT
Payer: COMMERCIAL

## 2019-08-15 VITALS
BODY MASS INDEX: 20.08 KG/M2 | HEART RATE: 85 BPM | WEIGHT: 117.6 LBS | SYSTOLIC BLOOD PRESSURE: 110 MMHG | DIASTOLIC BLOOD PRESSURE: 64 MMHG | OXYGEN SATURATION: 90 % | HEIGHT: 64 IN | TEMPERATURE: 99 F | RESPIRATION RATE: 18 BRPM

## 2019-08-15 DIAGNOSIS — R11.0 NAUSEA: ICD-10-CM

## 2019-08-15 DIAGNOSIS — R43.1 SENSITIVE TO SMELLS: ICD-10-CM

## 2019-08-15 DIAGNOSIS — G89.29 CHRONIC BILATERAL THORACIC BACK PAIN: ICD-10-CM

## 2019-08-15 DIAGNOSIS — D57.1 HB-SS DISEASE WITHOUT CRISIS (HCC): ICD-10-CM

## 2019-08-15 DIAGNOSIS — M54.6 CHRONIC BILATERAL THORACIC BACK PAIN: ICD-10-CM

## 2019-08-15 LAB
ANISOCYTOSIS BLD QL SMEAR: ABNORMAL
BASOPHILS # BLD AUTO: 0.7 % (ref 0–1.8)
BASOPHILS # BLD: 0.07 K/UL (ref 0–0.12)
BURR CELLS BLD QL SMEAR: NORMAL
COMMENT 1642: NORMAL
EOSINOPHIL # BLD AUTO: 0.26 K/UL (ref 0–0.51)
EOSINOPHIL NFR BLD: 2.5 % (ref 0–6.9)
ERYTHROCYTE [DISTWIDTH] IN BLOOD BY AUTOMATED COUNT: 72.7 FL (ref 35.9–50)
HCG SERPL QL: NEGATIVE
HCT VFR BLD AUTO: 23 % (ref 37–47)
HGB BLD-MCNC: 7.9 G/DL (ref 12–16)
IMM GRANULOCYTES # BLD AUTO: 0.04 K/UL (ref 0–0.11)
IMM GRANULOCYTES NFR BLD AUTO: 0.4 % (ref 0–0.9)
LYMPHOCYTES # BLD AUTO: 3.14 K/UL (ref 1–4.8)
LYMPHOCYTES NFR BLD: 30.6 % (ref 22–41)
MACROCYTES BLD QL SMEAR: ABNORMAL
MCH RBC QN AUTO: 30.2 PG (ref 27–33)
MCHC RBC AUTO-ENTMCNC: 34.3 G/DL (ref 33.6–35)
MCV RBC AUTO: 87.8 FL (ref 81.4–97.8)
MICROCYTES BLD QL SMEAR: ABNORMAL
MONOCYTES # BLD AUTO: 0.66 K/UL (ref 0–0.85)
MONOCYTES NFR BLD AUTO: 6.4 % (ref 0–13.4)
MORPHOLOGY BLD-IMP: NORMAL
NEUTROPHILS # BLD AUTO: 6.08 K/UL (ref 2–7.15)
NEUTROPHILS NFR BLD: 59.4 % (ref 44–72)
NRBC # BLD AUTO: 0.11 K/UL
NRBC BLD-RTO: 1.1 /100 WBC
PLATELET # BLD AUTO: 590 K/UL (ref 164–446)
PLATELET BLD QL SMEAR: NORMAL
PMV BLD AUTO: 10.7 FL (ref 9–12.9)
POIKILOCYTOSIS BLD QL SMEAR: NORMAL
POLYCHROMASIA BLD QL SMEAR: NORMAL
RBC # BLD AUTO: 2.62 M/UL (ref 4.2–5.4)
RBC BLD AUTO: PRESENT
SCHISTOCYTES BLD QL SMEAR: NORMAL
SICKLE CELLS BLD QL SMEAR: NORMAL
TARGETS BLD QL SMEAR: NORMAL
WBC # BLD AUTO: 10.3 K/UL (ref 4.8–10.8)

## 2019-08-15 PROCEDURE — 84703 CHORIONIC GONADOTROPIN ASSAY: CPT

## 2019-08-15 PROCEDURE — 36415 COLL VENOUS BLD VENIPUNCTURE: CPT

## 2019-08-15 PROCEDURE — 99214 OFFICE O/P EST MOD 30 MIN: CPT | Performed by: PHYSICIAN ASSISTANT

## 2019-08-15 PROCEDURE — 80500 HCHG CLINICAL PATH CONSULT-LIMITED: CPT

## 2019-08-15 PROCEDURE — 85025 COMPLETE CBC W/AUTO DIFF WBC: CPT

## 2019-08-16 LAB — PATH REV: NORMAL

## 2019-08-21 ENCOUNTER — PATIENT MESSAGE (OUTPATIENT)
Dept: MEDICAL GROUP | Facility: PHYSICIAN GROUP | Age: 24
End: 2019-08-21

## 2019-08-21 DIAGNOSIS — D57.1 HB-SS DISEASE WITHOUT CRISIS (HCC): ICD-10-CM

## 2019-08-21 DIAGNOSIS — R73.9 HEMOGLOBIN A1C BETWEEN 7.0% AND 9.0%: ICD-10-CM

## 2019-08-21 NOTE — PATIENT COMMUNICATION
Called patient left a voicemail that I spoke to Dr. Morin via email.  Dr. Morin had suggested patient get a blood transfusion.  Advised patient a blood transfusion is being ordered and they will contact her.  Advised patient to contact me if she has questions.  Will try to call patient back.

## 2019-08-30 NOTE — PROGRESS NOTES
Chief Complaint   Patient presents with   • Nausea     has been having nausea and back pain since gallbladder removal        HISTORY OF PRESENT ILLNESS: Alicia Tejada is an established 24 y.o. female here to discuss the evaluation and management of:    Chronic bilateral thoracic back pain  Chronic problem.  Patient states since February 2018 she is been experiencing intermittent episodes of thoracic midline upper back pain.  Symptoms are exacerbated after working out.  She tells me that her workout routine consist of weight lifting and ab workouts.  States back pain symptoms can be present for 3-4 days in duration.  States with rest symptoms improved.  She tells me that she uses a heating pad and stretching with improvement of symptoms.  Not take over-the-counter medications to help alleviate symptoms.    Nausea  Sensitive to smells  She tells me at the end of June she was due for Depo-Provera injection but chose not to get it because at this time in her life she does not want to be on birth control.  She tells me that she has not been sexually active for 1 month.  She tells me that she has been experiencing nausea intermittently throughout her day for the past 2-3 weeks and intermittent episodes of smell sensitivities.  States she has not been sexually active in 1 month.  She tells me that she recently took an at home pregnancy test and results were negative.  She tells me that she tries to eat food when she is feeling nauseous to see if it will improve symptoms but states it does not.  Last menstrual.  Was 8/3/5340-8513.  Patient denies vomiting or breast tenderness.  Denies heartburn, abdominal pain, changes in bowel habits, melena, hematochezia, night sweats, fever, chills.    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  Patient follows up with hematology.  She is requesting lab work.  Will order lab work.        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    Current Outpatient Medications   Medication Sig Dispense Refill   • montelukast (SINGULAIR) 10 MG Tab Take 1 Tab by mouth every day. 30 Tab 5   • hydrOXYzine HCl (ATARAX) 25 MG Tab Take 1 tab by mouth once nightly as needed. 60 Tab 6   • citalopram (CELEXA) 40 MG Tab Take 1 Tab by mouth every day. 30 Tab 6   • albuterol 108 (90 Base) MCG/ACT Aero Soln inhalation aerosol Inhale 2 Puffs by mouth every 6 hours as needed for Shortness of Breath.     • cetirizine (ZYRTEC) 10 MG Tab Take 10 mg by mouth 1 time daily as needed.       No current facility-administered medications for this visit.        Social History     Tobacco Use   • Smoking status: Never Smoker   • Smokeless tobacco: Never Used   Substance Use Topics   • Alcohol use: Yes     Comment: 1 drink/week   • Drug use: No       Family Status   Relation Name Status   • Mo  Alive   • Fa  Alive   • Bro  Alive   • MGMo     • MGFa     • PGMo  Alive   • PGFa  Alive   • Bro  Alive   • Bro  Alive   • MUnc  Alive     Family History   Problem Relation Age of Onset   • No Known Problems Mother    • No Known Problems Father    • No Known Problems Brother    • Diabetes Maternal Grandmother         Type II   • Hypertension Maternal Grandmother    • Diabetes Paternal Grandmother         Type II   • Hypertension Paternal Grandmother    • No Known Problems Brother    • GI Disease Brother         GI Ulcers   • Other Maternal Uncle         Sickle Cell        ROS:  Review of Systems   Constitutional: Negative for fever,  "chills, weight loss and malaise/fatigue.   HENT: Negative for ear pain, nosebleeds, congestion, sore throat and neck pain.  Positive for smell sensitivities.  Eyes: Negative for blurred vision.   Respiratory: Negative for cough, sputum production, shortness of breath and wheezing.    Cardiovascular: Negative for chest pain, palpitations, orthopnea and leg swelling.   Gastrointestinal: Negative for heartburn,vomiting and abdominal pain.  Positive for nausea.  Genitourinary: Negative for dysuria, urgency and frequency.   Musculoskeletal: Negative for myalgias and joint pain.  Positive for back pain.  Skin: Negative for rash and itching.   Neurological: Negative for dizziness, tingling, tremors, sensory change, focal weakness and headaches.   Endo/Heme/Allergies: Does not bruise/bleed easily.    Psychiatric/Behavioral: Negative for depression, suicidal ideas and memory loss.  The patient is not nervous/anxious and does not have insomnia.    All other systems reviewed and are negative except as in HPI.    Exam: /64 (BP Location: Left arm, Patient Position: Sitting, BP Cuff Size: Adult)   Pulse 85   Temp 37.2 °C (99 °F) (Temporal)   Resp 18   Ht 1.626 m (5' 4\")   Wt 53.3 kg (117 lb 9.6 oz)   SpO2 90%  Body mass index is 20.19 kg/m².  General: Normal appearing. No distress.  HEENT: Normocephalic. Eyes conjunctiva clear lids without ptosis, pupils equal and reactive to light accommodation, ears normal shape and contour, canals are clear bilaterally, tympanic membranes are benign, nasal mucosa benign, oropharynx is without erythema, edema or exudates.   Neck: Supple without JVD or bruit. Thyroid is not enlarged.  Pulmonary: Clear to ausculation.  Normal effort. No rales, ronchi, or wheezing.  Cardiovascular: Regular rate and rhythm without murmur.   Abdomen: Soft, nontender, nondistended. Normal bowel sounds. Liver and spleen are not palpable  Neurologic: Grossly nonfocal.  Cranial nerves are normal.   Lymph: No " cervical, supraclavicular or axillary lymph nodes are palpable  Skin: Warm and dry.  No rashes or suspicious skin lesions.  Musculoskeletal: Normal gait. No extremity cyanosis, clubbing, or edema.  Spinal column is nontender to palpation.  Paraspinal muscles are nontender to palpation.  No signs of trauma.  Normal range of motion of upper and lower extremity's.  Psych: Normal mood and affect. Alert and oriented x3. Judgment and insight is normal.    Medical decision-making and discussion:  1. Chronic bilateral thoracic back pain  She has been referred to physical therapy for further evaluation symptoms.  Advised patient to use proper body mechanics.  Use heat as needed and over-the-counter anti-inflammatories or Tylenol as needed.  Continue stretching.    - REFERRAL TO PHYSICAL THERAPY Reason for Therapy: Eval/Treat/Report    2. Nausea  3. Sensitive to smells  Lab work has been ordered to further evaluate patient for pregnancy.  Patient will be contacted with results.  Suspect nausea and smell sensitivity is due to hormonal fluctuation.  Patient recently stopped birth control methods.  Continue to monitor.    Follow-up for worsening symptoms,lack of expected recovery, or should new symptoms or problems arise.      4. 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  Patient follows up with hematology.  Lab work has been ordered to further evaluate patient.  Patient be contacted with results.    - CBC WITH DIFFERENTIAL; Future      Please note that this dictation was created using voice recognition software. I have made every reasonable attempt to correct obvious errors, but I expect that there are errors of grammar and possibly content that I did not discover before finalizing the note.    Assessment/Plan:  1. Chronic bilateral thoracic back pain  REFERRAL TO PHYSICAL THERAPY Reason for Therapy: Eval/Treat/Report   2. Nausea  CANCELED: HCG QUAL SERUM   3. Sensitive to smells  CANCELED: HCG QUAL SERUM    4. 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

## 2019-09-10 NOTE — PROGRESS NOTES
"09/10/19    Subjective    Chief Complaint:  24 female with SS disease    HPI:  Known SS disease since childhood but with minimal TX requirement and only one episode of crisis in 2014. Saw Dr. Garcia 11/29/2018 pre-op for gall bladder surgery done 1/2019. Had a 2 unit TX at that time.  Not on hydroxyurea.     ROS:    Constitutional: No weight loss  Skin: no rash  HENT: no c/o's  Cardiovascular:No arrythmia, chest pain  Respiratory: no cough, SOB  GI:No issues  : occ dark urine (? Hemolysis)  Musculoskeletal: No muscle or bone pain  Psych: No issues    PMH:  Surg - [facility]  Ill -[facility]  No Known Allergies    Medications:    Current Outpatient Medications on File Prior to Visit   Medication Sig Dispense Refill   • montelukast (SINGULAIR) 10 MG Tab Take 1 Tab by mouth every day. 30 Tab 5   • hydrOXYzine HCl (ATARAX) 25 MG Tab Take 1 tab by mouth once nightly as needed. 60 Tab 6   • citalopram (CELEXA) 40 MG Tab Take 1 Tab by mouth every day. 30 Tab 6   • albuterol 108 (90 Base) MCG/ACT Aero Soln inhalation aerosol Inhale 2 Puffs by mouth every 6 hours as needed for Shortness of Breath.     • cetirizine (ZYRTEC) 10 MG Tab Take 10 mg by mouth 1 time daily as needed.       No current facility-administered medications on file prior to visit.      FH:  F - SA  M - SA  3 B - SA  Maternal uncle SS    SH  S - 0  E - occ    Objective     Vitals:    /64 (BP Location: Right arm, Patient Position: Sitting, BP Cuff Size: Adult)   Pulse 78   Temp 36.8 °C (98.3 °F) (Temporal)   Resp 16   Ht 1.64 m (5' 4.57\")   Wt 53.2 kg (117 lb 4.6 oz)   SpO2 91%   BMI 19.78 kg/m²     Physical Exam:    Appears well-developed and well-nourished. No distress.    Head: Normocephalic .   Mouth/Throat: Oropharynx is clear and moist. No oropharyngeal exudate.   Eyes: Pupils are equal, round, and reactive to light. Conjunctivae and EOM are normal. No scleral icterus.   Neck: Normal range of motion. Neck supple. No thyromegaly  Cardiovascular: Normal " rate, regular rhythm, normal heart sounds and intact distal pulses. No  gallop, murmur or rub  Pulmonary/ Normal breath sounds.  No wheeze, rales or rhonci  Breast - symmetrical. No mass on indentation.  Abdominal: Soft. Bowel sounds are normal. No distension, tenderness, organomegaly or mass  Musculoskeletal: Normal range of motion. She exhibits no edema or tenderness.   Lymphadenopathy:   Head: No submental, submandibular, preauricular,   No cervical, axillary or inguinal adenopathy.    Neurological: She is alert and oriented to person, place, and time.   Skin: Skin is warm and dry. No rash noted. Not diaphoretic. No erythema. No pallor.   Psychiatric: She has a normal mood and affect. Her behavior is normal.     Labs:    Results for TOM GROVE (MRN [id number])    Ref. Range 8/15/2019 12:06   WBC Latest Ref Range: 4.8 - 10.8 K/uL 10.3   RBC Latest Ref Range: 4.20 - 5.40 M/uL 2.62 (L)   Hemoglobin Latest Ref Range: 12.0 - 16.0 g/dL 7.9 (L)   Hematocrit Latest Ref Range: 37.0 - 47.0 % 23.0 (L)   MCV Latest Ref Range: 81.4 - 97.8 fL 87.8   MCH Latest Ref Range: 27.0 - 33.0 pg 30.2   MCHC Latest Ref Range: 33.6 - 35.0 g/dL 34.3   RDW Latest Ref Range: 35.9 - 50.0 fL 72.7 (H)   Platelet Count Latest Ref Range: 164 - 446 K/uL 590 (H)     Assessment    Imp:    Visit Diagnosis:    1. 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

## 2019-09-11 ENCOUNTER — TELEPHONE (OUTPATIENT)
Dept: HEMATOLOGY ONCOLOGY | Facility: MEDICAL CENTER | Age: 24
End: 2019-09-11

## 2019-09-12 ENCOUNTER — OFFICE VISIT (OUTPATIENT)
Dept: HEMATOLOGY ONCOLOGY | Facility: MEDICAL CENTER | Age: 24
End: 2019-09-12
Payer: COMMERCIAL

## 2019-09-12 VITALS
SYSTOLIC BLOOD PRESSURE: 110 MMHG | RESPIRATION RATE: 16 BRPM | DIASTOLIC BLOOD PRESSURE: 64 MMHG | WEIGHT: 117.28 LBS | HEART RATE: 78 BPM | OXYGEN SATURATION: 91 % | BODY MASS INDEX: 19.54 KG/M2 | HEIGHT: 65 IN | TEMPERATURE: 98.3 F

## 2019-09-12 DIAGNOSIS — D57.1 HB-SS DISEASE WITHOUT CRISIS (HCC): ICD-10-CM

## 2019-09-12 PROCEDURE — 99213 OFFICE O/P EST LOW 20 MIN: CPT | Performed by: INTERNAL MEDICINE

## 2019-09-12 ASSESSMENT — PAIN SCALES - GENERAL: PAINLEVEL: NO PAIN

## 2019-09-18 ENCOUNTER — PHYSICAL THERAPY (OUTPATIENT)
Dept: PHYSICAL THERAPY | Facility: REHABILITATION | Age: 24
End: 2019-09-18
Attending: PHYSICIAN ASSISTANT
Payer: COMMERCIAL

## 2019-09-18 DIAGNOSIS — M54.6 CHRONIC BILATERAL THORACIC BACK PAIN: ICD-10-CM

## 2019-09-18 DIAGNOSIS — G89.29 CHRONIC BILATERAL THORACIC BACK PAIN: ICD-10-CM

## 2019-09-18 PROCEDURE — 97161 PT EVAL LOW COMPLEX 20 MIN: CPT

## 2019-09-18 PROCEDURE — 97014 ELECTRIC STIMULATION THERAPY: CPT

## 2019-09-18 ASSESSMENT — ENCOUNTER SYMPTOMS
PAIN SCALE AT LOWEST: 0
ALLEVIATING FACTORS: HEATING PAD
PAIN LOCATION: MID LOW BACK
PAIN SCALE AT HIGHEST: 7
QUALITY: ACHING
QUALITY: PULSATING
QUALITY: THROBBING
PAIN SCALE: 3
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
## 2019-09-23 ENCOUNTER — PHYSICAL THERAPY (OUTPATIENT)
Dept: PHYSICAL THERAPY | Facility: REHABILITATION | Age: 24
End: 2019-09-23
Attending: PHYSICIAN ASSISTANT
Payer: COMMERCIAL

## 2019-09-23 DIAGNOSIS — M54.6 CHRONIC BILATERAL THORACIC BACK PAIN: ICD-10-CM

## 2019-09-23 DIAGNOSIS — G89.29 CHRONIC BILATERAL THORACIC BACK PAIN: ICD-10-CM

## 2019-09-23 PROCEDURE — 97112 NEUROMUSCULAR REEDUCATION: CPT

## 2019-09-23 PROCEDURE — 97014 ELECTRIC STIMULATION THERAPY: CPT

## 2019-09-23 PROCEDURE — 97140 MANUAL THERAPY 1/> REGIONS: CPT

## 2019-09-24 NOTE — OP THERAPY DAILY TREATMENT
Outpatient Physical Therapy  DAILY TREATMENT     [facility] [address], [address]  [place] [address]  Phone:  [phone]    Date: 09/23/2019    Patient: Alicia Tejada  YOB: 1995  MRN: [id number]     Time Calculation  Start time: 1705  Stop time: 1755 Time Calculation (min): 50 minutes       Chief Complaint: Back Problem    Visit #: 2    SUBJECTIVE:  Feeling better until she moved over the weekend.  Now a bit aggravated.    OBJECTIVE:  Current objective measures: AROM L/s ext:  decreased 10%, painfree          Therapeutic Treatments and Modalities:     1. Neuromuscular Re-education (CPT 64778), Stabilizer abs level 1, 2 and Sally BASILIO hip abd/ER x 20, Ball wall bridges x 20- added to HEP with handout.     4. E Stim Unattended (CPT 35135), IFC to L/S with MHP x 15'    Time-based treatments/modalities:  Manual therapy minutes (CPT 42700): 10 minutes  Neuromusc re-ed, balance, coor, post minutes (CPT 25022): 20 minutes       Pain rating before treatment: 5  Pain rating after treatment: 2    ASSESSMENT:   Response to treatment: AROM L/S ROM and decreased hypertonicity in lumbar paraspinals.  Needing constant feedback to maintain neutral spine with stabilizing exercises.     PLAN/RECOMMENDATIONS:   Plan for treatment: therapy treatment to continue next visit.  Planned interventions for next visit: continue with current treatment.Progress lumbar stabilization.

## 2019-09-30 ENCOUNTER — PHYSICAL THERAPY (OUTPATIENT)
Dept: PHYSICAL THERAPY | Facility: REHABILITATION | Age: 24
End: 2019-09-30
Attending: PHYSICIAN ASSISTANT
Payer: COMMERCIAL

## 2019-09-30 DIAGNOSIS — M54.6 CHRONIC BILATERAL THORACIC BACK PAIN: ICD-10-CM

## 2019-09-30 DIAGNOSIS — G89.29 CHRONIC BILATERAL THORACIC BACK PAIN: ICD-10-CM

## 2019-09-30 PROCEDURE — 97112 NEUROMUSCULAR REEDUCATION: CPT

## 2019-09-30 PROCEDURE — 97014 ELECTRIC STIMULATION THERAPY: CPT

## 2019-09-30 NOTE — OP THERAPY DAILY TREATMENT
Outpatient Physical Therapy  DAILY TREATMENT     [facility] [address], [address]  [place] [address]  Phone:  [phone]    Date: 09/30/2019    Patient: Alicia Tejada  YOB: 1995  MRN: [id number]     Time Calculation               Chief Complaint: No chief complaint on file.    Visit #: 3    SUBJECTIVE:  ***    OBJECTIVE:  Current objective measures: ***          Therapeutic Treatments and Modalities:     1. Neuromuscular Re-education (CPT 12248), Stabilizer abs level 1, 2 and BKFO, Sahrmann B hip abd/ER x 20, Ball wall bridges x 20- added to HEP with handout.     4. E Stim Unattended (CPT 26803), IFC to L/S with MHP x 15'    Time-based treatments/modalities:          Pain rating before treatment: {PAIN NUMBERS_1-10:77591}  Pain rating after treatment: {PAIN NUMBERS_1-10:89892}    ASSESSMENT:   Response to treatment: ***    PLAN/RECOMMENDATIONS:   Plan for treatment: therapy treatment to continue next visit.  Planned interventions for next visit: continue with current treatment.Progress lumbar stab.

## 2019-10-01 NOTE — OP THERAPY DAILY TREATMENT
Outpatient Physical Therapy  DAILY TREATMENT     [facility] [place] [address], [address]  [place] [address]  Phone:  [phone]    Date: 09/30/2019    Patient: Alicia Tejada  YOB: 1995  MRN: [id number]     Time Calculation  Start time: 1605  Stop time: 1652 Time Calculation (min): 47 minutes       Chief Complaint: Back Problem    Visit #: 3    SUBJECTIVE:  Feeling 50% better.  No back pain today even after work day.      OBJECTIVE:          Therapeutic Treatments and Modalities:     1. Neuromuscular Re-education (CPT 87717), Stabilizer abs level 1, 2 and BKFO, Sahrmann B hip abd/ER with pink TBx 20, Pink TB given for HEP, Ball bridges x 20- added to HEP with handout.     4. E Stim Unattended (CPT 51214), IFC to L/S with MHP x 15'    5. Manual Therapy (CPT 83311), L unilateral PA gr 4 L3-5, MFR L QL, central PAs L1-5    Time-based treatments/modalities:  Manual therapy minutes (CPT 74267): 5 minutes  Neuromusc re-ed, balance, coor, post minutes (CPT 05214): 25 minutes       Pain rating before treatment: 0  Pain rating after treatment: 0    ASSESSMENT:   Response to treatment: Difficult to maintain lumbar/R LE dissociation with Stabilizer but core stability improving overall.      PLAN/RECOMMENDATIONS:   Plan for treatment: therapy treatment to continue next visit.  Planned interventions for next visit: continue with current treatment.Progress lumbar stab and high level balance for core.

## 2019-10-02 ENCOUNTER — NON-PROVIDER VISIT (OUTPATIENT)
Dept: MEDICAL GROUP | Facility: MEDICAL CENTER | Age: 24
End: 2019-10-02
Payer: COMMERCIAL

## 2019-10-02 DIAGNOSIS — Z23 NEED FOR VACCINATION: ICD-10-CM

## 2019-10-02 PROCEDURE — 90686 IIV4 VACC NO PRSV 0.5 ML IM: CPT | Performed by: FAMILY MEDICINE

## 2019-10-02 PROCEDURE — 90471 IMMUNIZATION ADMIN: CPT | Performed by: FAMILY MEDICINE

## 2019-10-02 NOTE — NON-PROVIDER
"Alicia Tejada is a 24 y.o. female here for a non-provider visit for:   FLU    Reason for immunization: Annual Flu Vaccine  Immunization records indicate need for vaccine: Yes, confirmed with Epic  Minimum interval has been met for this vaccine: Yes  ABN completed: Not Indicated    Order and dose verified by: Aed  VIS Dated  8-7-2015 was given to patient: Yes  All IAC Questionnaire questions were answered \"No.\"    Patient tolerated injection and no adverse effects were observed or reported: Yes    Pt scheduled for next dose in series: Not Indicated    "

## 2019-10-03 ENCOUNTER — APPOINTMENT (OUTPATIENT)
Dept: PHYSICAL THERAPY | Facility: REHABILITATION | Age: 24
End: 2019-10-03
Attending: PHYSICIAN ASSISTANT
Payer: COMMERCIAL

## 2019-10-03 NOTE — OP THERAPY DAILY TREATMENT
Outpatient Physical Therapy  DAILY TREATMENT     [facility] Therapy [address], [address]  [place] [address]  Phone:  [phone]    Date: 10/03/2019    Patient: Alicia Tejada  YOB: 1995  MRN: [id number]     Time Calculation               Chief Complaint: No chief complaint on file.    Visit #: 4    SUBJECTIVE:  ***    OBJECTIVE:  Current objective measures: ***          Therapeutic Exercises (CPT 38228):     1. DKTC with rocking x 20    3. Bridges x 20    4. Seated thoracic/lumbar stretch    5. Seated oblique stretch    6. Bridges with tiny step, bilateral    7. Prone superman alteranting lifts UE/LE    Therapeutic Treatments and Modalities:     1. E Stim Unattended (CPT 59880), IFC to L/S with MHP x 15'    Time-based treatments/modalities:          Pain rating before treatment: {PAIN NUMBERS_1-10:55330}  Pain rating after treatment: {PAIN NUMBERS_1-10:09441}    ASSESSMENT:   Response to treatment: ***    PLAN/RECOMMENDATIONS:   Plan for treatment: therapy treatment to continue next visit.  Planned interventions for next visit: continue with current treatment.

## 2019-10-07 ENCOUNTER — PHYSICAL THERAPY (OUTPATIENT)
Dept: PHYSICAL THERAPY | Facility: REHABILITATION | Age: 24
End: 2019-10-07
Attending: PHYSICIAN ASSISTANT
Payer: COMMERCIAL

## 2019-10-07 DIAGNOSIS — G89.29 CHRONIC BILATERAL THORACIC BACK PAIN: ICD-10-CM

## 2019-10-07 DIAGNOSIS — M54.6 CHRONIC BILATERAL THORACIC BACK PAIN: ICD-10-CM

## 2019-10-07 PROCEDURE — 97112 NEUROMUSCULAR REEDUCATION: CPT

## 2019-10-07 NOTE — OP THERAPY DAILY TREATMENT
Outpatient Physical Therapy  DAILY TREATMENT     [facility] Therapy [address], [address]  [place] [address]  Phone:  [phone]    Date: 10/07/2019    Patient: Alicia Tejada  YOB: 1995  MRN: [id number]     Time Calculation  Start time: 1630  Stop time: 1700 Time Calculation (min): 30 minutes       Chief Complaint: Back Problem    Visit #: 4    SUBJECTIVE:  Feeling 70% better. Sleeping better and feeling good at the end of the work day.      OBJECTIVE:            Therapeutic Treatments and Modalities:     1. Neuromuscular Re-education (CPT 67612), Stabilizer abs level 1, 2 and BKFO, ball rolling x 15, Sahrmann B hip abd/ER x 20, Ball wall squats, supermans 5 sec hold x 20 each., B multifidus with lumbar rotation green TB x 20, B shoulder flex pull downs for ab recruitment x 20, green TB    Time-based treatments/modalities:  Neuromusc re-ed, balance, coor, post minutes (CPT 22886): 30 minutes       Pain rating before treatment: 0  Pain rating after treatment: 0    ASSESSMENT:   Response to treatment: Improving lumbar stability.  Able to progress with good lower ab recruitment    PLAN/RECOMMENDATIONS:   Plan for treatment: therapy treatment to continue next visit.  Will decrease to 1x/week x 2 weeks.    Planned interventions for next visit: continue with current treatment. Progress lumbar stab.

## 2019-10-09 ENCOUNTER — APPOINTMENT (OUTPATIENT)
Dept: PHYSICAL THERAPY | Facility: REHABILITATION | Age: 24
End: 2019-10-09
Attending: PHYSICIAN ASSISTANT
Payer: COMMERCIAL

## 2019-10-14 ENCOUNTER — APPOINTMENT (OUTPATIENT)
Dept: PHYSICAL THERAPY | Facility: REHABILITATION | Age: 24
End: 2019-10-14
Attending: PHYSICIAN ASSISTANT
Payer: COMMERCIAL

## 2019-10-21 NOTE — PROGRESS NOTES
Patient arrived to infusion center with roommate for PRBC infusion prior to surgery on 1/15/19. Labs verified from 1/7/19. PIV established and COD sent. Premedicated with tylenol and benadryl. Two units of PRBC's infused without difficulty. PIV discontinued and site covered with gauze and coban. Patient left infusion center with no apparent distress.    no

## 2019-10-23 ENCOUNTER — APPOINTMENT (OUTPATIENT)
Dept: PHYSICAL THERAPY | Facility: REHABILITATION | Age: 24
End: 2019-10-23
Attending: PHYSICIAN ASSISTANT
Payer: COMMERCIAL

## 2019-10-28 DIAGNOSIS — F41.9 ANXIETY: ICD-10-CM

## 2019-10-28 NOTE — TELEPHONE ENCOUNTER
----- Message from Alicia Tejada sent at 10/28/2019  7:58 AM PDT -----  Regarding: Non-Urgent Medical Question  Contact: [phone]  Can I please have a refill for Citalopram 40mg tabs to [facility] on [address]. The pharmacy says I'm out of refills. Thank you!    Alicia

## 2019-10-29 RX ORDER — CITALOPRAM 40 MG/1
40 TABLET ORAL DAILY
Qty: 30 TAB | Refills: 6 | Status: SHIPPED | OUTPATIENT
Start: 2019-10-29 | End: 2020-06-24 | Stop reason: SDUPTHER

## 2019-11-04 ENCOUNTER — TELEPHONE (OUTPATIENT)
Dept: PHYSICAL THERAPY | Facility: REHABILITATION | Age: 24
End: 2019-11-04

## 2019-11-04 NOTE — OP THERAPY DISCHARGE SUMMARY
Outpatient Physical Therapy  DISCHARGE SUMMARY NOTE      [facility] [address], [address]  [place] [address]  Phone:  [phone]    Date of Visit: 11/04/2019    Patient: Alicia Tejada  YOB: 1995  MRN: [id number]     Referring Provider: No referring provider defined for this encounter.   Referring Diagnosis No admission diagnoses are documented for this encounter.     Physical Therapy Occurrence Codes    Date of onset of impairment:  2/25/19   Date physical therapy care plan established or reviewed:  9/18/19   Date physical therapy treatment started:  9/18/19          Functional Assessment Used        Your patient is being discharged from Physical Therapy with the following comments:   · Goals partially met    Comments:   Pt was last seen 10/7/2019 for her 4th PT visit.  At that time she reported feeling 70% better.  Her L/S AROM was full and pain free in all directions.  She was sleeping well and felt good at the end of her work day.  Frequency of visits was decreased to 1x/week.  Pt failed to show up for her last 2 scheduled appointments.        Recommendations:  Will D/C and assume pt is able to manage sxs with her HEP.  If she needs additional PT visits at this time, she will need a new referral.  Thank you for this referral.     Anjali Verduzco, PT, MPT, OCS    Date: 11/4/2019

## 2020-01-21 ENCOUNTER — OFFICE VISIT (OUTPATIENT)
Dept: MEDICAL GROUP | Facility: PHYSICIAN GROUP | Age: 25
End: 2020-01-21
Payer: COMMERCIAL

## 2020-01-21 VITALS
HEIGHT: 65 IN | OXYGEN SATURATION: 90 % | WEIGHT: 116.6 LBS | SYSTOLIC BLOOD PRESSURE: 108 MMHG | TEMPERATURE: 98.5 F | BODY MASS INDEX: 19.43 KG/M2 | DIASTOLIC BLOOD PRESSURE: 54 MMHG | RESPIRATION RATE: 16 BRPM | HEART RATE: 88 BPM

## 2020-01-21 DIAGNOSIS — N92.3 INTERMENSTRUAL BLEEDING: ICD-10-CM

## 2020-01-21 PROCEDURE — 99213 OFFICE O/P EST LOW 20 MIN: CPT | Performed by: PHYSICIAN ASSISTANT

## 2020-01-21 ASSESSMENT — PATIENT HEALTH QUESTIONNAIRE - PHQ9: CLINICAL INTERPRETATION OF PHQ2 SCORE: 0

## 2020-01-21 NOTE — PROGRESS NOTES
Chief Complaint   Patient presents with   • Menstrual Problem     pt states having period twice a month since getting of depo,spotting x 9 days       HISTORY OF PRESENT ILLNESS: Alicia Tejaad is an established 24 y.o. female here to discuss the evaluation and management of:    Patient is a pleasant 24-year-old female here today to gas intermenstrual bleeding.  She tells me in June 2019 she discontinued Depo-Provera injections and since then she has been having 2 menstrual periods per month.  States menses last 6 days in duration and positive for heavy bleeding on the first 2 days with moderate bleeding for 3 days and light bleeding on the last day.  Positive for cramping on the first day.    States she has been spotting for the past 9 days.  She mentions that her menstrual cycles were regular prior to starting Depo-Provera.  Patient is not sexually active.  Denies being pregnant.  Denies abnormal Pap smears.  Patient is inquiring about treatment options.      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    Current Outpatient Medications   Medication Sig Dispense Refill   • citalopram (CELEXA) 40 MG Tab Take 1 Tab by mouth every day. 30 Tab 6   • montelukast (SINGULAIR) 10 MG Tab Take 1 Tab by mouth every day. 30 Tab 5   • hydrOXYzine HCl (ATARAX) 25 MG Tab Take 1 tab by mouth once nightly as needed. 60 Tab 6   • albuterol 108 (90 Base) MCG/ACT Aero Soln inhalation aerosol Inhale 2 Puffs by mouth every 6 hours as needed for Shortness of  Breath.     • cetirizine (ZYRTEC) 10 MG Tab Take 10 mg by mouth 1 time daily as needed.       No current facility-administered medications for this visit.        Social History     Tobacco Use   • Smoking status: Never Smoker   • Smokeless tobacco: Never Used   Substance Use Topics   • Alcohol use: Yes     Comment: 1 drink/week   • Drug use: No       Family Status   Relation Name Status   • Mo  Alive   • Fa  Alive   • Bro  Alive   • MGMo     • MGFa     • PGMo  Alive   • PGFa  Alive   • Bro  Alive   • Bro  Alive   • MUnc  Alive     Family History   Problem Relation Age of Onset   • No Known Problems Mother    • No Known Problems Father    • No Known Problems Brother    • Diabetes Maternal Grandmother         Type II   • Hypertension Maternal Grandmother    • Diabetes Paternal Grandmother         Type II   • Hypertension Paternal Grandmother    • No Known Problems Brother    • GI Disease Brother         GI Ulcers   • Other Maternal Uncle         Sickle Cell        ROS:  Review of Systems   Constitutional: Negative for fever, chills, weight loss and malaise/fatigue.   HENT: Negative for ear pain, nosebleeds, congestion, sore throat and neck pain.    Eyes: Negative for blurred vision.   Respiratory: Negative for cough, sputum production, shortness of breath and wheezing.    Cardiovascular: Negative for chest pain, palpitations, orthopnea and leg swelling.   Gastrointestinal: Negative for heartburn, nausea, vomiting and abdominal pain.   Genitourinary: Negative for dysuria, urgency and frequency.  Positive for abnormal menstrual bleeding.  Musculoskeletal: Negative for myalgias, back pain and joint pain.   Skin: Negative for rash and itching.   Neurological: Negative for dizziness, tingling, tremors, sensory change, focal weakness and headaches.   Endo/Heme/Allergies: Does not bruise/bleed easily.   Psychiatric/Behavioral: Negative for depression, suicidal ideas and memory loss.  The patient is not  "nervous/anxious and does not have insomnia.    All other systems reviewed and are negative except as in HPI.    Exam: /54 (BP Location: Left arm, Patient Position: Sitting)   Pulse 88   Temp 36.9 °C (98.5 °F) (Temporal)   Resp 16   Ht 1.64 m (5' 4.57\")   Wt 52.9 kg (116 lb 9.6 oz)   SpO2 90%  Body mass index is 19.66 kg/m².  General: Normal appearing. No distress.  HEENT: Normocephalic. Eyes conjunctiva clear lids without ptosis,ears normal shape and contour..   Neck: Supple without JVD or bruit. Thyroid is not enlarged.  Pulmonary: Clear to ausculation.  Normal effort. No rales, ronchi, or wheezing.  Cardiovascular: Regular rate and rhythm without murmur.  Abdomen: Soft, nontender, nondistended.  Neurologic: Grossly nonfocal.  Cranial nerves are normal.   Lymph: No cervical or supraclavicular lymph nodes are palpable  Skin: Warm and dry.  No rashes or suspicious skin lesions.  Musculoskeletal: Normal gait. No extremity cyanosis, clubbing, or edema.  Psych: Normal mood and affect. Alert and oriented x3. Judgment and insight is normal.    Medical decision-making and discussion:  1. Intermenstrual bleeding  Patient has a positive medical history for anemia.  Lab work has been ordered to further evaluate patient.  Patient will be contacted with results.  Advised patient take over-the-counter 800 mg ibuprofen 3 times daily for 10 days.  Patient has been referred to gynecology for further evaluation of intermenstrual bleeding.  Continue to monitor.  - CBC WITH DIFFERENTIAL; Future  - IRON/TOTAL IRON BIND; Future  - FERRITIN; Future  - REFERRAL TO GYNECOLOGY      Please note that this dictation was created using voice recognition software. I have made every reasonable attempt to correct obvious errors, but I expect that there are errors of grammar and possibly content that I did not discover before finalizing the note.    Assessment/Plan:  1. Intermenstrual bleeding  CBC WITH DIFFERENTIAL    IRON/TOTAL IRON " BIND    FERRITIN    REFERRAL TO GYNECOLOGY       Return if symptoms worsen or fail to improve.

## 2020-02-21 ENCOUNTER — HOSPITAL ENCOUNTER (OUTPATIENT)
Dept: LAB | Facility: MEDICAL CENTER | Age: 25
End: 2020-02-21
Attending: PHYSICIAN ASSISTANT
Payer: COMMERCIAL

## 2020-02-21 DIAGNOSIS — N92.3 INTERMENSTRUAL BLEEDING: ICD-10-CM

## 2020-02-21 LAB
ANISOCYTOSIS BLD QL SMEAR: ABNORMAL
BASOPHILS # BLD AUTO: 0.4 % (ref 0–1.8)
BASOPHILS # BLD: 0.05 K/UL (ref 0–0.12)
COMMENT 1642: NORMAL
EOSINOPHIL # BLD AUTO: 0.15 K/UL (ref 0–0.51)
EOSINOPHIL NFR BLD: 1.2 % (ref 0–6.9)
ERYTHROCYTE [DISTWIDTH] IN BLOOD BY AUTOMATED COUNT: 79.7 FL (ref 35.9–50)
HCT VFR BLD AUTO: 21 % (ref 37–47)
HGB BLD-MCNC: 7.2 G/DL (ref 12–16)
HYPOCHROMIA BLD QL SMEAR: ABNORMAL
IMM GRANULOCYTES # BLD AUTO: 0.05 K/UL (ref 0–0.11)
IMM GRANULOCYTES NFR BLD AUTO: 0.4 % (ref 0–0.9)
IRON SATN MFR SERPL: 43 % (ref 15–55)
IRON SERPL-MCNC: 120 UG/DL (ref 40–170)
LYMPHOCYTES # BLD AUTO: 3.12 K/UL (ref 1–4.8)
LYMPHOCYTES NFR BLD: 24.7 % (ref 22–41)
MACROCYTES BLD QL SMEAR: ABNORMAL
MCH RBC QN AUTO: 30.3 PG (ref 27–33)
MCHC RBC AUTO-ENTMCNC: 34.3 G/DL (ref 33.6–35)
MCV RBC AUTO: 88.2 FL (ref 81.4–97.8)
MONOCYTES # BLD AUTO: 0.8 K/UL (ref 0–0.85)
MONOCYTES NFR BLD AUTO: 6.3 % (ref 0–13.4)
MORPHOLOGY BLD-IMP: NORMAL
NEUTROPHILS # BLD AUTO: 8.45 K/UL (ref 2–7.15)
NEUTROPHILS NFR BLD: 67 % (ref 44–72)
NRBC # BLD AUTO: 0.18 K/UL
NRBC BLD-RTO: 1.4 /100 WBC
PLATELET # BLD AUTO: 586 K/UL (ref 164–446)
PLATELET BLD QL SMEAR: NORMAL
PMV BLD AUTO: 10.8 FL (ref 9–12.9)
POIKILOCYTOSIS BLD QL SMEAR: NORMAL
POLYCHROMASIA BLD QL SMEAR: NORMAL
RBC # BLD AUTO: 2.38 M/UL (ref 4.2–5.4)
RBC BLD AUTO: PRESENT
SCHISTOCYTES BLD QL SMEAR: NORMAL
SICKLE CELLS BLD QL SMEAR: NORMAL
TARGETS BLD QL SMEAR: NORMAL
TIBC SERPL-MCNC: 281 UG/DL (ref 250–450)
WBC # BLD AUTO: 12.6 K/UL (ref 4.8–10.8)

## 2020-02-21 PROCEDURE — 85025 COMPLETE CBC W/AUTO DIFF WBC: CPT

## 2020-02-21 PROCEDURE — 83550 IRON BINDING TEST: CPT

## 2020-02-21 PROCEDURE — 36415 COLL VENOUS BLD VENIPUNCTURE: CPT

## 2020-02-21 PROCEDURE — 83540 ASSAY OF IRON: CPT

## 2020-02-21 PROCEDURE — 82728 ASSAY OF FERRITIN: CPT

## 2020-02-22 LAB — FERRITIN SERPL-MCNC: 78.5 NG/ML (ref 10–291)

## 2020-05-06 DIAGNOSIS — F51.01 PRIMARY INSOMNIA: ICD-10-CM

## 2020-05-06 RX ORDER — HYDROXYZINE HYDROCHLORIDE 25 MG/1
TABLET, FILM COATED ORAL
Qty: 90 TAB | Refills: 2 | Status: SHIPPED | OUTPATIENT
Start: 2020-05-06 | End: 2021-03-15 | Stop reason: SDUPTHER

## 2020-06-11 DIAGNOSIS — Z11.3 SCREENING FOR STD (SEXUALLY TRANSMITTED DISEASE): ICD-10-CM

## 2020-06-15 ENCOUNTER — HOSPITAL ENCOUNTER (OUTPATIENT)
Dept: LAB | Facility: MEDICAL CENTER | Age: 25
End: 2020-06-15
Attending: PHYSICIAN ASSISTANT
Payer: COMMERCIAL

## 2020-06-15 DIAGNOSIS — Z11.3 SCREENING FOR STD (SEXUALLY TRANSMITTED DISEASE): ICD-10-CM

## 2020-06-15 PROCEDURE — 36415 COLL VENOUS BLD VENIPUNCTURE: CPT

## 2020-06-15 PROCEDURE — 87389 HIV-1 AG W/HIV-1&-2 AB AG IA: CPT

## 2020-06-15 PROCEDURE — 86780 TREPONEMA PALLIDUM: CPT

## 2020-06-15 PROCEDURE — 87491 CHLMYD TRACH DNA AMP PROBE: CPT

## 2020-06-15 PROCEDURE — 86803 HEPATITIS C AB TEST: CPT

## 2020-06-15 PROCEDURE — 87340 HEPATITIS B SURFACE AG IA: CPT

## 2020-06-15 PROCEDURE — 87591 N.GONORRHOEAE DNA AMP PROB: CPT

## 2020-06-16 LAB
C TRACH DNA SPEC QL NAA+PROBE: NEGATIVE
HBV SURFACE AG SER QL: NORMAL
HCV AB SER QL: NORMAL
HIV 1+2 AB+HIV1 P24 AG SERPL QL IA: NORMAL
N GONORRHOEA DNA SPEC QL NAA+PROBE: NEGATIVE
SPECIMEN SOURCE: NORMAL
TREPONEMA PALLIDUM IGG+IGM AB [PRESENCE] IN SERUM OR PLASMA BY IMMUNOASSAY: NORMAL

## 2020-06-26 ENCOUNTER — HOSPITAL ENCOUNTER (OUTPATIENT)
Facility: MEDICAL CENTER | Age: 25
End: 2020-06-26
Attending: PHYSICIAN ASSISTANT
Payer: COMMERCIAL

## 2020-06-26 DIAGNOSIS — Z11.3 SCREENING FOR STD (SEXUALLY TRANSMITTED DISEASE): ICD-10-CM

## 2020-06-26 PROCEDURE — 87660 TRICHOMONAS VAGIN DIR PROBE: CPT

## 2020-06-26 PROCEDURE — 87480 CANDIDA DNA DIR PROBE: CPT

## 2020-06-26 PROCEDURE — 87510 GARDNER VAG DNA DIR PROBE: CPT

## 2020-06-27 LAB
AMBIGUOUS DTTM AMBI4: NORMAL
CANDIDA DNA VAG QL PROBE+SIG AMP: NEGATIVE
G VAGINALIS DNA VAG QL PROBE+SIG AMP: NEGATIVE
SIGNIFICANT IND 70042: NORMAL
SITE SITE: NORMAL
SOURCE SOURCE: NORMAL
T VAGINALIS DNA VAG QL PROBE+SIG AMP: NEGATIVE

## 2020-07-11 ENCOUNTER — HOSPITAL ENCOUNTER (OUTPATIENT)
Facility: MEDICAL CENTER | Age: 25
End: 2020-07-11
Attending: PHYSICIAN ASSISTANT
Payer: COMMERCIAL

## 2020-07-11 ENCOUNTER — OFFICE VISIT (OUTPATIENT)
Dept: URGENT CARE | Facility: CLINIC | Age: 25
End: 2020-07-11
Payer: COMMERCIAL

## 2020-07-11 VITALS
RESPIRATION RATE: 18 BRPM | HEIGHT: 64 IN | HEART RATE: 91 BPM | SYSTOLIC BLOOD PRESSURE: 104 MMHG | TEMPERATURE: 97.2 F | BODY MASS INDEX: 20.01 KG/M2 | DIASTOLIC BLOOD PRESSURE: 68 MMHG | WEIGHT: 117.2 LBS | OXYGEN SATURATION: 94 %

## 2020-07-11 DIAGNOSIS — M79.10 MYALGIA: ICD-10-CM

## 2020-07-11 DIAGNOSIS — Z20.822 CLOSE EXPOSURE TO COVID-19 VIRUS: Primary | ICD-10-CM

## 2020-07-11 DIAGNOSIS — R50.9 FEVER, UNSPECIFIED FEVER CAUSE: ICD-10-CM

## 2020-07-11 PROCEDURE — U0003 INFECTIOUS AGENT DETECTION BY NUCLEIC ACID (DNA OR RNA); SEVERE ACUTE RESPIRATORY SYNDROME CORONAVIRUS 2 (SARS-COV-2) (CORONAVIRUS DISEASE [COVID-19]), AMPLIFIED PROBE TECHNIQUE, MAKING USE OF HIGH THROUGHPUT TECHNOLOGIES AS DESCRIBED BY CMS-2020-01-R: HCPCS

## 2020-07-11 PROCEDURE — 99214 OFFICE O/P EST MOD 30 MIN: CPT | Mod: CR,CS | Performed by: PHYSICIAN ASSISTANT

## 2020-07-11 ASSESSMENT — FIBROSIS 4 INDEX: FIB4 SCORE: 0.36

## 2020-07-11 NOTE — PATIENT INSTRUCTIONS
INSTRUCTIONS FOR COVID-19 OR ANY OTHER INFECTIOUS RESPIRATORY ILLNESSES    The Centers for Disease Control and Prevention (CDC) states that early indications for COVID-19 include cough, shortness of breath, difficulty breathing, or at least two of the following symptoms: chills, shaking with chills, muscle pain, headache, sore throat, and loss of taste or smell. Symptoms can range from mild to severe and may appear up to two weeks after exposure to the virus.    The practice of self-isolation and quarantine helps protect the public and your family by  preventing exposure to people who have or may have a contagious disease. Please follow the prevention steps below as based on CDC guidelines:    WHEN TO STOP ISOLATION: Persons with COVID-19 or any other infectious respiratory illness who have symptoms and were advised to care for themselves at home may discontinue home isolation under the following conditions:  · At least 3 days (72 hours) have passed since recovery defined as resolution of fever without the use of fever-reducing medications; AND,  · Improvement in respiratory symptoms (e.g., cough, shortness of breath); AND,  · At least 10 days have passed since symptoms first appeared and have had no subsequent illness.    MONITOR YOUR SYMPTOMS: If your illness is worsening, seek prompt medical attention. If you have a medical emergency and need to call 911, notify the dispatch personnel that you have, or are being evaluated for confirmed or suspected COVID-19 or another infectious respiratory illness. Wear a facemask if possible.    ACTIVITY RESTRICTION: restrict activities outside your home, except for getting medical care. Do not go to work, school, or public areas. Avoid using public transportation, ride-sharing, or taxis.    SCHEDULED MEDICAL APPOINTMENTS: Notify your provider that you have, or are being evaluated for, confirmed or suspected COVID-19 or another infectious respiratory. This will help the  healthcare provider’s office safely take care of you and keep other people from getting exposed or infected.    FACEMASKS, when to wear: Anytime you are away from your home or around other people or pets. If you are unable to wear one, maintain a minimum of 6 feet distancing from others.    LIVING ENVIRONMENT: Stay in a separate room from other people and pets. If possible, use a separate bathroom, have someone else care for your pets and avoid sharing household items. Any items used should be washed thoroughly with soap and water. Clean all “high-touch” surfaces every day. Use a household cleaning spray or wipe, according to the label instructions. High touch surfaces include (but are not limited to) counters, tabletops, doorknobs, bathroom fixtures, toilets, phones, keyboards, tablets, and bedside tables.     HAND WASHING: Frequently wash hands with soap and water for at least 20 seconds,  especially after blowing your nose, coughing, or sneezing; going to the bathroom; before and after interacting with pets; and before and after eating or preparing food. If hands are visibly dirty use soap and water. If soap and water are not available, use an alcohol-based hand  with at least 60% alcohol. Avoid touching your eyes, nose, and mouth with unwashed hands. Cover your coughs and sneezes with a tissue. Throw used tissues in a lined trash can. Immediately wash your hands.    ACTIVE/FACILITATED SELF-MONITORING: Follow instructions provided by your local health department or health professionals, as appropriate. When working with your local health department check their available hours.    [facility]   Phone Number   [facility] [phone]   [facility] [phone]   [place]   [place] [phone]     IF YOU HAVE CONFIRMED POSITIVE COVID-19:    Those who have completely recovered from COVID-19 may have immune-boosting antibodies in their plasma--called “convalescent plasma”--that  could be used to treat critically ill COVID19 patients.    [facility] is excited to begin working with [place] on collecting convalescent plasma from  people who have recovered from COVID-19 as part of a program to treat patients infected with the virus. This FDA-approved “emergency investigational new drug” is a special blood product containing antibodies that may give patients an extra boost to fight the virus.    To be eligible to donate convalescent plasma, you must have a prior COVID-19 diagnosis documented by a laboratory test (or a positive test result for SARS-CoV-2 antibodies) and meet additional eligibility requirements.    If you are interested in donating convalescent plasma or have any additional questions, please contact the [facility] Convalescent Plasma  at [phone] or via e-mail at [email or web address].

## 2020-07-11 NOTE — PROGRESS NOTES
Subjective:      Alicia Tejada is a 25 y.o. female who presents with Body Aches (x4 days, bodyaches, low grade fever, possible exposure to covid )    Medications:    • albuterol Aers  • cetirizine Tabs  • citalopram Tabs  • hydrOXYzine HCl Tabs  • montelukast Tabs    Allergies: Patient has no known allergies.    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    Surgical History:  Past Surgical History:   Procedure Laterality Date   • ANUP BY LAPAROSCOPY  1/15/2019    Procedure: laparoscopic cholecystectomy;  Surgeon: Noel Greene M.D.;  Location: SURGERY [facility];  Service: General   • TONSILLECTOMY         Past Social Hx: Alicia Tejada  reports that she has never smoked. She has never used smokeless tobacco. She reports current alcohol use. She reports that she does not use drugs.     Past Family Hx:  Alicia Tejada family history includes Diabetes in her maternal grandmother and paternal grandmother; GI Disease in her brother; Hypertension in her maternal grandmother and paternal grandmother; No Known Problems in her brother, brother, father, and mother; Other in her maternal uncle.     Problem list, medications, and allergies reviewed by myself today in Epic.         Patient presents with:  Body Aches: x4 days, bodyaches, low grade fever, possible exposure to covid         Fever    This is a new problem. The current episode started in the past 7 days (4 days ago). The problem occurs constantly. The problem has been waxing and waning. The maximum temperature noted was 99 to 99.9 F. The temperature was taken using an oral thermometer. Associated symptoms  "include headaches and muscle aches. Pertinent negatives include no coughing, nausea, sore throat or wheezing. She has tried acetaminophen for the symptoms. The treatment provided no relief.       Review of Systems   Constitutional: Positive for fever and malaise/fatigue.   HENT: Negative for sore throat.    Respiratory: Negative for cough and wheezing.    Gastrointestinal: Negative for nausea.   Musculoskeletal: Positive for myalgias.   Neurological: Positive for headaches.   All other systems reviewed and are negative.         Objective:     /68   Pulse 91   Temp 36.2 °C (97.2 °F) (Temporal)   Resp 18   Ht 1.626 m (5' 4\")   Wt 53.2 kg (117 lb 3.2 oz)   SpO2 94%   BMI 20.12 kg/m²      Physical Exam  Vitals signs and nursing note reviewed.   Constitutional:       General: She is not in acute distress.     Appearance: Normal appearance. She is well-developed. She is not ill-appearing or toxic-appearing.   HENT:      Head: Normocephalic and atraumatic.      Right Ear: Tympanic membrane normal.      Left Ear: Tympanic membrane normal.      Nose: Nose normal.      Mouth/Throat:      Lips: Pink.      Mouth: Mucous membranes are moist.      Pharynx: Oropharynx is clear. Uvula midline.   Eyes:      Extraocular Movements: Extraocular movements intact.      Conjunctiva/sclera: Conjunctivae normal.      Pupils: Pupils are equal, round, and reactive to light.   Neck:      Musculoskeletal: Normal range of motion and neck supple.   Cardiovascular:      Rate and Rhythm: Normal rate and regular rhythm.      Pulses: Normal pulses.      Heart sounds: Normal heart sounds.   Pulmonary:      Effort: Pulmonary effort is normal.      Breath sounds: Normal breath sounds.   Abdominal:      General: Bowel sounds are normal.      Palpations: Abdomen is soft.   Musculoskeletal: Normal range of motion.   Skin:     General: Skin is warm and dry.      Capillary Refill: Capillary refill takes less than 2 seconds.   Neurological:      " General: No focal deficit present.      Mental Status: She is alert and oriented to person, place, and time.      Cranial Nerves: No cranial nerve deficit.      Motor: Motor function is intact.      Coordination: Coordination is intact.      Gait: Gait normal.   Psychiatric:         Mood and Affect: Mood normal.         Thought Content: Thought content normal.                 Assessment/Plan:     1. Close exposure to COVID-19 virus  COVID/SARS COV-2 PCR   2. Fever, unspecified fever cause     3. Myalgia       Will call with test results.     Per protocol for PUI/ISO patients, the patient was evaluated by me while I was wearing PPE.  Per CDC guidelines, patient has been instructed to self quarantine at home for at least 7 days from onset of symptoms and at least 3 full days after resolution of fever/respiratory symptoms.  PT verbalized agreement to do so.

## 2020-07-12 DIAGNOSIS — Z20.822 CLOSE EXPOSURE TO COVID-19 VIRUS: ICD-10-CM

## 2020-07-12 LAB
COVID ORDER STATUS COVID19: NORMAL
SARS-COV-2 RNA RESP QL NAA+PROBE: NOTDETECTED
SPECIMEN SOURCE: NORMAL

## 2020-07-14 ASSESSMENT — ENCOUNTER SYMPTOMS
FEVER: 1
HEADACHES: 1
COUGH: 0
MYALGIAS: 1
NAUSEA: 0
SORE THROAT: 0
WHEEZING: 0

## 2020-07-22 ENCOUNTER — NON-PROVIDER VISIT (OUTPATIENT)
Dept: MEDICAL GROUP | Facility: MEDICAL CENTER | Age: 25
End: 2020-07-22
Payer: COMMERCIAL

## 2020-07-22 ENCOUNTER — HOSPITAL ENCOUNTER (OUTPATIENT)
Facility: MEDICAL CENTER | Age: 25
End: 2020-07-22
Attending: INTERNAL MEDICINE
Payer: COMMERCIAL

## 2020-07-22 DIAGNOSIS — R10.2 PELVIC PAIN: ICD-10-CM

## 2020-07-22 DIAGNOSIS — N39.0 URINARY TRACT INFECTION WITHOUT HEMATURIA, SITE UNSPECIFIED: ICD-10-CM

## 2020-07-22 LAB
APPEARANCE UR: CLEAR
BILIRUB UR STRIP-MCNC: NEGATIVE MG/DL
COLOR UR AUTO: YELLOW
GLUCOSE UR STRIP.AUTO-MCNC: NEGATIVE MG/DL
KETONES UR STRIP.AUTO-MCNC: 8 MG/DL
LEUKOCYTE ESTERASE UR QL STRIP.AUTO: NEGATIVE
NITRITE UR QL STRIP.AUTO: NEGATIVE
PH UR STRIP.AUTO: NORMAL [PH] (ref 5–8)
PROT UR QL STRIP: 1.01 MG/DL
RBC UR QL AUTO: 6.5
SP GR UR STRIP.AUTO: NEGATIVE
UROBILINOGEN UR STRIP-MCNC: NEGATIVE MG/DL

## 2020-07-22 PROCEDURE — 87086 URINE CULTURE/COLONY COUNT: CPT

## 2020-07-22 PROCEDURE — 81002 URINALYSIS NONAUTO W/O SCOPE: CPT | Performed by: INTERNAL MEDICINE

## 2020-07-22 NOTE — NON-PROVIDER
Aliciathomas Tejada is a 25 y.o. female here for a non-provider visit for pelvic pain    If abnormal was an in office provider notified today (if so, indicate provider)? Yes  Routed to PCP? Yes

## 2020-07-23 DIAGNOSIS — N39.0 URINARY TRACT INFECTION WITHOUT HEMATURIA, SITE UNSPECIFIED: ICD-10-CM

## 2020-07-24 ENCOUNTER — TELEPHONE (OUTPATIENT)
Dept: MEDICAL GROUP | Facility: MEDICAL CENTER | Age: 25
End: 2020-07-24

## 2020-07-25 ENCOUNTER — HOSPITAL ENCOUNTER (OUTPATIENT)
Dept: RADIOLOGY | Facility: MEDICAL CENTER | Age: 25
End: 2020-07-25
Attending: NURSE PRACTITIONER
Payer: COMMERCIAL

## 2020-07-25 ENCOUNTER — OFFICE VISIT (OUTPATIENT)
Dept: URGENT CARE | Facility: PHYSICIAN GROUP | Age: 25
End: 2020-07-25
Payer: COMMERCIAL

## 2020-07-25 VITALS
DIASTOLIC BLOOD PRESSURE: 52 MMHG | WEIGHT: 117 LBS | RESPIRATION RATE: 14 BRPM | OXYGEN SATURATION: 99 % | HEIGHT: 64 IN | BODY MASS INDEX: 19.97 KG/M2 | HEART RATE: 94 BPM | SYSTOLIC BLOOD PRESSURE: 98 MMHG | TEMPERATURE: 98.3 F

## 2020-07-25 DIAGNOSIS — R10.30 LOWER ABDOMINAL PAIN: ICD-10-CM

## 2020-07-25 DIAGNOSIS — K59.00 CONSTIPATION, UNSPECIFIED CONSTIPATION TYPE: ICD-10-CM

## 2020-07-25 DIAGNOSIS — R14.0 BLOATED ABDOMEN: ICD-10-CM

## 2020-07-25 LAB
BACTERIA UR CULT: NORMAL
SIGNIFICANT IND 70042: NORMAL
SITE SITE: NORMAL
SOURCE SOURCE: NORMAL

## 2020-07-25 PROCEDURE — 74018 RADEX ABDOMEN 1 VIEW: CPT

## 2020-07-25 PROCEDURE — 99213 OFFICE O/P EST LOW 20 MIN: CPT | Performed by: NURSE PRACTITIONER

## 2020-07-25 ASSESSMENT — ENCOUNTER SYMPTOMS
NAUSEA: 0
WEAKNESS: 0
MYALGIAS: 0
HEADACHES: 0
ABDOMINAL PAIN: 1
CHILLS: 0
PALPITATIONS: 0
CONSTIPATION: 0
DIZZINESS: 0
DIARRHEA: 0
FLANK PAIN: 0
VOMITING: 0
ORTHOPNEA: 0
FEVER: 0

## 2020-07-25 ASSESSMENT — FIBROSIS 4 INDEX: FIB4 SCORE: 0.36

## 2020-07-25 NOTE — TELEPHONE ENCOUNTER
I called the patient and left a message.  The final results are still pending and may take another 24 to 48 hours.

## 2020-07-25 NOTE — PROGRESS NOTES
Subjective:      Alicia Tejada is a 25 y.o. female who presents with Abdominal Pain (abd pain and bloating x3days )            HPI  Low abdominal pain x 3 days. Denies fever, n/v/d. Denies dysuria, hematuria. States getting gas pains/bloating. LMP expected in 1 week, denies abdominal cramping.    PMH:  has a past medical history of Asthma, Sickle cell anemia (HCC), and Snoring.  MEDS:   Current Outpatient Medications:   •  citalopram (CELEXA) 40 MG Tab, Take 1 Tab by mouth every day., Disp: 90 Tab, Rfl: 1  •  hydrOXYzine HCl (ATARAX) 25 MG Tab, Take 1 tab by mouth once nightly as needed., Disp: 90 Tab, Rfl: 2  •  montelukast (SINGULAIR) 10 MG Tab, Take 1 Tab by mouth every day., Disp: 30 Tab, Rfl: 5  •  albuterol 108 (90 Base) MCG/ACT Aero Soln inhalation aerosol, Inhale 2 Puffs by mouth every 6 hours as needed for Shortness of Breath., Disp: , Rfl:   •  cetirizine (ZYRTEC) 10 MG Tab, Take 10 mg by mouth 1 time daily as needed., Disp: , Rfl:   ALLERGIES: No Known Allergies  SURGHX:   Past Surgical History:   Procedure Laterality Date   • ANUP BY LAPAROSCOPY  1/15/2019    Procedure: laparoscopic cholecystectomy;  Surgeon: Noel Greene M.D.;  Location: SURGERY [facility];  Service: General   • TONSILLECTOMY       SOCHX:  reports that she has never smoked. She has never used smokeless tobacco. She reports current alcohol use. She reports that she does not use drugs.  FH: Family history was reviewed, no pertinent findings to report    Review of Systems   Constitutional: Negative for chills, fever and malaise/fatigue.   Cardiovascular: Negative for chest pain, palpitations and orthopnea.   Gastrointestinal: Positive for abdominal pain. Negative for constipation, diarrhea, nausea and vomiting.   Genitourinary: Negative for dysuria, flank pain, frequency, hematuria and urgency.   Musculoskeletal: Negative for myalgias.   Neurological: Negative for dizziness, weakness and headaches.   All other systems  "reviewed and are negative.         Objective:     BP (!) 98/52 (BP Location: Left arm, Patient Position: Sitting, BP Cuff Size: Adult)   Pulse 94   Temp 36.8 °C (98.3 °F) (Temporal)   Resp 14   Ht 1.626 m (5' 4\")   Wt 53.1 kg (117 lb)   SpO2 99%   BMI 20.08 kg/m²      Physical Exam  Vitals signs reviewed.   Constitutional:       General: She is awake. She is not in acute distress.     Appearance: Normal appearance. She is well-developed. She is not ill-appearing, toxic-appearing or diaphoretic.   Eyes:      Conjunctiva/sclera: Conjunctivae normal.      Pupils: Pupils are equal, round, and reactive to light.   Neck:      Musculoskeletal: Normal range of motion and neck supple.   Cardiovascular:      Rate and Rhythm: Normal rate.   Pulmonary:      Effort: Pulmonary effort is normal.      Breath sounds: Normal breath sounds.   Abdominal:      General: Bowel sounds are normal. There is no distension.      Palpations: Abdomen is soft.      Tenderness: There is no abdominal tenderness. There is no right CVA tenderness, left CVA tenderness, guarding or rebound.   Musculoskeletal: Normal range of motion.   Skin:     General: Skin is warm and dry.   Neurological:      Mental Status: She is alert and oriented to person, place, and time.   Psychiatric:         Behavior: Behavior is cooperative.                 Assessment/Plan:       1. Lower abdominal pain    - [id number] VIEW; Future    2. Constipation, unspecified constipation type    3. Bloated abdomen  - [id number] VIEW; Future      Increase water intake  Recommend daily stool softener with laxative prn like Miralax  Encourage fibrous food intake and balanced diet including fruits and vegetables, decrease sugar and processed foods  May introduce a fiber supplement daily  Monitor for severe abdominal pain with fever and inability to have bowel movement, sreekanth blood with bowel movement, fever, n/v- need to got to ER, mother understands this      "

## 2020-07-26 ENCOUNTER — TELEPHONE (OUTPATIENT)
Dept: MEDICAL GROUP | Facility: MEDICAL CENTER | Age: 25
End: 2020-07-26

## 2020-07-27 ENCOUNTER — TELEPHONE (OUTPATIENT)
Dept: MEDICAL GROUP | Facility: MEDICAL CENTER | Age: 25
End: 2020-07-27

## 2020-07-27 NOTE — TELEPHONE ENCOUNTER
Called patient on Saturday and left a message, tried again Sunday no answer, I did not leave a message on Sunday.  Please notify her that the urine culture is negative for infection.

## 2020-07-27 NOTE — TELEPHONE ENCOUNTER
----- Message from Silvino Craft M.D. sent at 7/26/2020  8:39 PM PDT -----  Called patient on Saturday and left a message, tried again Sunday no answer, I did not leave a message on Sunday.  Please notify her that the urine culture is negative for infection.

## 2020-08-04 ENCOUNTER — NON-PROVIDER VISIT (OUTPATIENT)
Dept: MEDICAL GROUP | Facility: MEDICAL CENTER | Age: 25
End: 2020-08-04
Payer: COMMERCIAL

## 2020-08-04 PROCEDURE — 86580 TB INTRADERMAL TEST: CPT | Performed by: NURSE PRACTITIONER

## 2020-08-04 NOTE — NON-PROVIDER
Alicia Tejada is a 25 y.o. female here for a non-provider visit for PPD placement -- Step 1 of 2    Reason for PPD:  school requirement    1. TB evaluation questionnaire completed by patient? Yes      -  If any answers marked yes did you contact a provider prior to placing? Not Indicated  2.  Patient notified to return to clinic for reading on: 8/6/2020-8/7/2020  3.  PPD Placement documentation completed on TB evaluation questionnaire? Yes  4.  Location of TB evaluation questionnaire filed: [facility], in draw room

## 2020-08-07 ENCOUNTER — NON-PROVIDER VISIT (OUTPATIENT)
Dept: MEDICAL GROUP | Facility: MEDICAL CENTER | Age: 25
End: 2020-08-07

## 2020-08-07 LAB — TB WHEAL 3D P 5 TU DIAM: 0 MM

## 2020-08-07 NOTE — NON-PROVIDER
Alicia Tejada is a 25 y.o. female here for a non-provider visit for PPD reading -- Step 2 of 2.      1.  Resulted in Epic under enter/edit results? Yes   2.  TB evaluation questionnaire scanned into chart and original given to patient?Yes      3. Was induration greater than 0 mm? No.    If Step 1 of 2, when is patient returning for second step (delete if N/A): 8/14/2020    Routed to PCP? No

## 2020-08-14 ENCOUNTER — NON-PROVIDER VISIT (OUTPATIENT)
Dept: MEDICAL GROUP | Facility: MEDICAL CENTER | Age: 25
End: 2020-08-14

## 2020-08-14 DIAGNOSIS — Z11.1 PPD SCREENING TEST: ICD-10-CM

## 2020-08-14 PROCEDURE — 86580 TB INTRADERMAL TEST: CPT | Performed by: FAMILY MEDICINE

## 2020-08-14 NOTE — PROGRESS NOTES
Alicia Tejada is a 25 y.o. female here for a non-provider visit for PPD placement -- Step 2 of 2    Reason for PPD:  school requirement    1. TB evaluation questionnaire completed by patient? Yes      -  If any answers marked yes did you contact a provider prior to placing? Not Indicated  2.  Patient notified to return to clinic for reading on: After Sunday, 8/16 at 11:29 am or before Monday, 8/17 at 11:29 am.  3.  PPD Placement documentation completed on TB evaluation questionnaire? Yes  4.  Location of TB evaluation questionnaire filed: Draw Room

## 2020-08-17 ENCOUNTER — NON-PROVIDER VISIT (OUTPATIENT)
Dept: MEDICAL GROUP | Facility: MEDICAL CENTER | Age: 25
End: 2020-08-17
Payer: COMMERCIAL

## 2020-08-17 LAB — TB WHEAL 3D P 5 TU DIAM: 0 MM

## 2020-08-17 NOTE — NON-PROVIDER
Alicia Tejada is a 25 y.o. female here for a non-provider visit for PPD reading -- Step 2 of 2.      1.  Resulted in Epic under enter/edit results? Yes   2.  TB evaluation questionnaire scanned into chart and original given to patient?Yes      3. Was induration greater than 0 mm? No.    Routed to PCP? No

## 2020-08-18 ENCOUNTER — OFFICE VISIT (OUTPATIENT)
Dept: MEDICAL GROUP | Facility: PHYSICIAN GROUP | Age: 25
End: 2020-08-18
Payer: COMMERCIAL

## 2020-08-18 VITALS
SYSTOLIC BLOOD PRESSURE: 100 MMHG | DIASTOLIC BLOOD PRESSURE: 60 MMHG | TEMPERATURE: 100.1 F | BODY MASS INDEX: 19.81 KG/M2 | OXYGEN SATURATION: 90 % | WEIGHT: 116 LBS | HEART RATE: 83 BPM | RESPIRATION RATE: 14 BRPM | HEIGHT: 64 IN

## 2020-08-18 DIAGNOSIS — J30.1 SEASONAL ALLERGIC RHINITIS DUE TO POLLEN: ICD-10-CM

## 2020-08-18 DIAGNOSIS — F41.9 ANXIETY: ICD-10-CM

## 2020-08-18 DIAGNOSIS — Z30.017 ENCOUNTER FOR INITIAL PRESCRIPTION OF NEXPLANON: ICD-10-CM

## 2020-08-18 DIAGNOSIS — Z00.00 WELLNESS EXAMINATION: ICD-10-CM

## 2020-08-18 DIAGNOSIS — D57.1 HB-SS DISEASE WITHOUT CRISIS (HCC): ICD-10-CM

## 2020-08-18 DIAGNOSIS — J45.20 MILD INTERMITTENT ASTHMA WITHOUT COMPLICATION: ICD-10-CM

## 2020-08-18 PROBLEM — K80.20 CALCULUS OF GALLBLADDER WITHOUT CHOLECYSTITIS WITHOUT OBSTRUCTION: Status: RESOLVED | Noted: 2018-12-06 | Resolved: 2020-08-18

## 2020-08-18 PROBLEM — Z30.42 ENCOUNTER FOR SURVEILLANCE OF INJECTABLE CONTRACEPTIVE: Status: RESOLVED | Noted: 2018-09-24 | Resolved: 2020-08-18

## 2020-08-18 PROBLEM — Z30.09 COUNSELING FOR BIRTH CONTROL, ORAL CONTRACEPTIVES: Status: RESOLVED | Noted: 2018-08-16 | Resolved: 2020-08-18

## 2020-08-18 PROCEDURE — 99395 PREV VISIT EST AGE 18-39: CPT | Performed by: PHYSICIAN ASSISTANT

## 2020-08-18 ASSESSMENT — LIFESTYLE VARIABLES
DURING THE PAST 12 MONTHS, ON HOW MANY DAYS DID YOU DRINK MORE THAN A FEW SIPS OF BEER, WINE, OR ANY DRINK CONTAINING ALCOHOL: 30
HAVE YOU EVER GOTTEN IN TROUBLE WHILE YOU WERE USING ALCOHOL OR DRUGS: NO
HAVE YOU EVER RIDDEN IN A CAR DRIVEN BY SOMEONE WHO WAS HIGH OR HAD BEEN USING ALCOHOL OR DRUGS: NO
DO YOU EVER FORGET THINGS YOU DID WHILE USING ALCOHOL OR DRUGS: NO
PART A TOTAL SCORE: 30
DO YOU EVER USE ALCOHOL OR DRUGS TO RELAX, FEEL BETTER ABOUT YOURSELF, OR FIT IN: NO
DURING THE PAST 12 MONTHS, ON HOW MANY DAYS DID YOU USE ANY TOBACCO OR NICOTINE PRODUCTS: 0
DO YOU EVER USE ALCOHOL OR DRUGS WHILE YOU ARE BY YOURSELF ALONE: NO
DURING THE PAST 12 MONTHS, ON HOW MANY DAYS DID YOU USE ANY MARIJUANA: 0
DO YOUR FAMILY OR FRIENDS EVER TELL YOU THAT YOU SHOULD CUT DOWN ON YOUR DRINKING OR DRUG USE: NO
DURING THE PAST 12 MONTHS, ON HOW MANY DAYS DID YOU USE ANYTHING ELSE TO GET HIGH: 0

## 2020-08-18 ASSESSMENT — FIBROSIS 4 INDEX: FIB4 SCORE: 0.36

## 2020-08-18 NOTE — PROGRESS NOTES
Chief Complaint   Patient presents with   • Annual Exam     No pap        HISTORY OF PRESENT ILLNESS: Alicia Tejada is an established 25 y.o. female here to discuss the evaluation and management of:    Patient is a pleasant 25-year-old female here today for annual examination.  She tells me that she is in overall good health and has no complaints.  States on average she is exercising 3-4 times a week and exercise routine consist of strength training 1-1-1/2 hours per time.  States her diet could improve.  She admits that she eats more carbohydrates and sugars than she should.  States she does eat a lot of fruits and vegetables as well.  She admits that she needs to work on hydration.  She denies taking over-the-counter multivitamin.  Admits to doing self breast exams least once monthly.  States if she wears Crohn screen if she knows she is going to be exposed to sun for extended periods of time.  States on average she has a soft bowel movement every other day.  Positive for good urine output.  States she has not had an eye exam in 7 years.  Admits to getting regular dental exams.  She tells me that her menstrual cycles are regular and menses is 5 days in duration.  Positive for heavy bleeding the first 2 days of menses.  States she will experience moderate cramping 1- 2 days prior to menstrual period.  States occasionally she will take over-the-counter ibuprofen and Tylenol to alleviate symptoms.  She denies being pregnant.  She admits that she is sexually active and in a monogamous relationship.  States condom use is always used as well as coitus interruptus.  Patient would like to follow-up with a gynecologist discuss Nexplanon in more detail.      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  Lab work is up-to-date.  Repeat lab work has been ordered to further evaluate patient.  Patient is following up with hematology closely.  She admits that she needs to work on  hydration.    Anxiety  Chronic but stable problem.  Patient states she is taking Celexa 40 mg once daily.  Denies side effects or complications of medication.  She also takes hydroxyzine 25 mg tab once nightly as needed.  States she only takes medication if she is feeling anxious and cannot fall asleep.  States she infrequently takes hydroxyzine but it works well for her.  She denies homicidal or suicidal ideation.  She feels symptoms are managed on current medication regimen.  Does not need refills at this time.    Mild intermittent asthma without complication  Chronic but stable problem.  Patient states she uses an albuterol inhaler as needed.  She admits that she keeps inhaler with her all times.  Denies side effects or complications medication.  Symptoms are managed with medication.    Seasonal allergic rhinitis due to pollen  Chronic but stable problem.  Symptoms are treated with daily use of Flonase.  States she uses Zyrtec and montelukast as needed.  Symptoms are managed on this regimen.  Does not need refills at this time.  States she is doing well.    Encounter for initial prescription of Nexplanon  See above.      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    Current Outpatient Medications   Medication Sig Dispense Refill   • citalopram (CELEXA) 40 MG Tab Take 1 Tab by mouth every day. 90 Tab 1   • hydrOXYzine HCl (ATARAX) 25 MG Tab Take 1 tab by mouth once nightly as needed. 90 Tab 2   • montelukast (SINGULAIR) 10 MG Tab Take 1 Tab by mouth every day. 30 Tab 5   • albuterol 108 (90 Base) MCG/ACT Aero Soln inhalation aerosol Inhale 2 Puffs by mouth every 6 hours as needed for Shortness of Breath.     • cetirizine (ZYRTEC) 10 MG Tab  Take 10 mg by mouth 1 time daily as needed.       No current facility-administered medications for this visit.        Social History     Tobacco Use   • Smoking status: Never Smoker   • Smokeless tobacco: Never Used   Substance Use Topics   • Alcohol use: Yes     Comment: socially.    • Drug use: No       Family Status   Relation Name Status   • Mo  Alive   • Fa  Alive   • Bro  Alive   • MGMo     • MGFa     • PGMo  Alive   • PGFa  Alive   • Bro  Alive   • Bro  Alive   • MUnc  Alive     Family History   Problem Relation Age of Onset   • No Known Problems Mother    • No Known Problems Father    • No Known Problems Brother    • Diabetes Maternal Grandmother         Type II   • Hypertension Maternal Grandmother    • Diabetes Paternal Grandmother         Type II   • Hypertension Paternal Grandmother    • No Known Problems Brother    • GI Disease Brother         GI Ulcers   • Other Maternal Uncle         Sickle Cell        ROS:  Review of Systems   Constitutional: Negative for fever, chills, weight loss and malaise/fatigue.   HENT: Negative for ear pain, nosebleeds, congestion, sore throat and neck pain.    Eyes: Negative for blurred vision.   Respiratory: Negative for cough, sputum production, shortness of breath and wheezing.    Cardiovascular: Negative for chest pain, palpitations, orthopnea and leg swelling.   Gastrointestinal: Negative for heartburn, nausea, vomiting and abdominal pain.   Genitourinary: Negative for dysuria, urgency and frequency.   Musculoskeletal: Negative for myalgias, back pain and joint pain.   Skin: Negative for rash and itching.   Neurological: Negative for dizziness, tingling, tremors, sensory change, focal weakness and headaches.   Endo/Heme/Allergies: Does not bruise/bleed easily.   Psychiatric/Behavioral: Negative for depression, suicidal ideas and memory loss.  The patient is not nervous/anxious and does not have insomnia.    All other systems reviewed and are negative  "except as in HPI.    Exam: /60 (BP Location: Left arm, Patient Position: Sitting, BP Cuff Size: Adult)   Pulse 83   Temp 37.8 °C (100.1 °F) (Temporal)   Resp 14   Ht 1.626 m (5' 4\")   Wt 52.6 kg (116 lb)   SpO2 90%  Body mass index is 19.91 kg/m².  General: Normal appearing. No distress.  HEENT: Normocephalic. Eyes conjunctiva positive for mild yellow discoloration and lids without ptosis, pupils equal and reactive to light accommodation, ears normal shape and contour, canals are clear bilaterally, tympanic membranes are benign, nasal mucosa benign, oropharynx is without erythema, edema or exudates.   Neck: Supple without JVD. Thyroid is not enlarged.  Pulmonary: Clear to ausculation.  Normal effort. No rales, ronchi, or wheezing.  Cardiovascular: Regular rate and rhythm without murmur.   Abdomen: Soft, nontender, nondistended. Normal bowel sounds. Liver and spleen are not palpable  Neurologic: Grossly nonfocal.  Cranial nerves are normal.   Skin: Warm and dry.  No rashes or suspicious skin lesions.  Musculoskeletal: Normal gait. No extremity cyanosis, clubbing, or edema.  Psych: Normal mood and affect. Alert and oriented x3. Judgment and insight is normal.    Medical decision-making and discussion:  1. Wellness examination  - Encouraged diet high in fruits, vegetables, and fiber. And a diet low in salt, refined carbohydrates, cholesterol, saturated fat, and trans fatty acids.    - Encouraged  a minimum of 30 minutes of moderate intensity aerobic exercise (eg, brisk walking) is recommended on five days each week. Or 20 minutes of vigorous-intensity aerobic exercise (eg, jogging) on three days each week.     Advised patient continue work on diet, exercise, sleep hygiene, and developing healthy coping mechanisms for stress and anxiety.  Discussed importance of wearing sunscreen and practicing seatbelt safety.  Discussed the importance of getting regular eye and dental exams.  Discussed importance of " practicing safe sex.  Discussed STI risk with patient.    Provided patient with letter stating that there is no physical limitations prohibiting her from participating in clinical rotations.    2. 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  Continue following up with hematology.  Continue working on hydration.  Continue to monitor.  - CBC WITHOUT DIFFERENTIAL; Future  - FERRITIN; Future  - IRON/TOTAL IRON BIND; Future    3. Anxiety  Patient is feeling well on current medications. Will continue. Denies any suicidal or homicidal ideation. Emphasized importance of healthy diet and exercise. Discussed that should the patient have any symptoms they should call suicide prevention hotline or report to the emergency room immediately.      4. Mild intermittent asthma without complication  Chronic but stable problem.  Advised patient to keep inhaler with her at all times.  If she ever develops shortness of breath not alleviated with medication to seek me emergency care.  Continue albuterol as needed.  Continue to monitor.    5. Seasonal allergic rhinitis due to pollen  Advised nasal saline and steroid sprays daily. OTC anti-histamines (Zyrtec) as needed. Encouraged allergen avoidence and environment modification when possible. If regular use not effective, may consider referral to allergist for immunotherapy.      6. Encounter for initial prescription of Nexplanon    - REFERRAL TO GYNECOLOGY      Please note that this dictation was created using voice recognition software. I have made every reasonable attempt to correct obvious errors, but I expect that there are errors of grammar and possibly content that I did not discover before finalizing the note.    Assessment/Plan:  1. Wellness examination     2. 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 Anxiety     4. Mild intermittent asthma without complication     5. Seasonal allergic rhinitis due to  pollen     6. Encounter for initial prescription of Nexplanon  REFERRAL TO GYNECOLOGY       Return in about 1 year (around 8/18/2021), or if symptoms worsen or fail to improve.

## 2020-08-18 NOTE — LETTER
August 18, 2020    To Whom It May Concern:         This is confirmation that Alicia Tejada attended her scheduled appointment with Marlee Shearer P.A.-C. on 8/18/20. Alicia has no physical limitations to participating in clinical rotations.          If you have any questions please do not hesitate to call me at the phone number listed below.    Sincerely,          Marlee Shearer P.A.-C.  [phone]

## 2020-08-18 NOTE — LETTER
August 18, 2020    To Whom It May Concern:         This is confirmation that Alicia Tejada attended her scheduled appointment with Marlee Shearer P.A.-C. on 8/18/20. Alicia has no physical limitations to participating in clinical rotations.          If you have any questions please do not hesitate to call me at the phone number listed below.    Sincerely,          Marlee Shearer P.A.-C.  [phone]

## 2020-08-26 ENCOUNTER — HOSPITAL ENCOUNTER (OUTPATIENT)
Facility: MEDICAL CENTER | Age: 25
End: 2020-08-26
Attending: NURSE PRACTITIONER
Payer: COMMERCIAL

## 2020-08-26 ENCOUNTER — EH NON-PROVIDER (OUTPATIENT)
Dept: OCCUPATIONAL MEDICINE | Facility: CLINIC | Age: 25
End: 2020-08-26

## 2020-08-26 DIAGNOSIS — Z11.59 ENCOUNTER FOR SCREENING FOR OTHER VIRAL DISEASES: ICD-10-CM

## 2020-08-26 PROCEDURE — U0003 INFECTIOUS AGENT DETECTION BY NUCLEIC ACID (DNA OR RNA); SEVERE ACUTE RESPIRATORY SYNDROME CORONAVIRUS 2 (SARS-COV-2) (CORONAVIRUS DISEASE [COVID-19]), AMPLIFIED PROBE TECHNIQUE, MAKING USE OF HIGH THROUGHPUT TECHNOLOGIES AS DESCRIBED BY CMS-2020-01-R: HCPCS | Mod: CS | Performed by: PREVENTIVE MEDICINE

## 2020-09-01 ENCOUNTER — TELEPHONE (OUTPATIENT)
Dept: OCCUPATIONAL MEDICINE | Facility: CLINIC | Age: 25
End: 2020-09-01

## 2020-09-01 NOTE — TELEPHONE ENCOUNTER
Phone Number Called: [phone] (home)       Call outcome: Spoke to patient regarding message below.    Message:   pt notified COVID results Negative and to contact Infection Prevention

## 2020-09-03 ENCOUNTER — OFFICE VISIT (OUTPATIENT)
Dept: MEDICAL GROUP | Facility: PHYSICIAN GROUP | Age: 25
End: 2020-09-03
Payer: COMMERCIAL

## 2020-09-03 VITALS
RESPIRATION RATE: 24 BRPM | DIASTOLIC BLOOD PRESSURE: 54 MMHG | WEIGHT: 118.2 LBS | SYSTOLIC BLOOD PRESSURE: 96 MMHG | HEIGHT: 64 IN | HEART RATE: 86 BPM | BODY MASS INDEX: 20.18 KG/M2 | TEMPERATURE: 99 F | OXYGEN SATURATION: 91 %

## 2020-09-03 DIAGNOSIS — R14.0 ABDOMINAL BLOATING: ICD-10-CM

## 2020-09-03 DIAGNOSIS — R10.2 PELVIC PAIN IN FEMALE: ICD-10-CM

## 2020-09-03 PROCEDURE — 99213 OFFICE O/P EST LOW 20 MIN: CPT | Performed by: PHYSICIAN ASSISTANT

## 2020-09-03 ASSESSMENT — FIBROSIS 4 INDEX: FIB4 SCORE: 0.36

## 2020-09-06 ENCOUNTER — HOSPITAL ENCOUNTER (OUTPATIENT)
Dept: RADIOLOGY | Facility: MEDICAL CENTER | Age: 25
End: 2020-09-06
Attending: PHYSICIAN ASSISTANT
Payer: COMMERCIAL

## 2020-09-06 DIAGNOSIS — R10.2 PELVIC PAIN IN FEMALE: ICD-10-CM

## 2020-09-06 PROCEDURE — 76830 TRANSVAGINAL US NON-OB: CPT

## 2020-09-10 NOTE — PROGRESS NOTES
Chief Complaint   Patient presents with   • Pain     Recurring stomach issues       HISTORY OF PRESENT ILLNESS: Alicia Tejada is an established 25 y.o. female here to discuss the evaluation and management of:    Patient is a pleasant 25-year-old female here today to discuss pelvic pain abdominal bloating.  She tells me for the past 1 month symptoms have been on and off.  States pain symptoms is located over the center of her suprapubic region and describes pain as an aching pain that can develop into a sharp nonradiating pain.  Symptoms can last up to an hour.  States on average symptoms are happening 2 times a week.  States for instance last week when she was voiding she experienced symptoms.  States she is also experiencing abdominal bloating.  She is found that Nexium helps abdominal bloating.  She has noticed that symptoms seem to happen before her menstrual period.  States she is on her menses now.  She tells me that she is also experiencing some nausea.  She denies being pregnant.  States she took a pregnancy test 1 month ago.  She feels that the sea foods do make it worse.  She denies fever, chills, changes in caliber of stool, melena, daily, intention weight loss, lymphadenopathy, night sweats.      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    Current Outpatient Medications   Medication Sig Dispense Refill   • citalopram (CELEXA) 40 MG Tab Take 1 Tab by mouth every day. 90 Tab 1   • hydrOXYzine HCl (ATARAX) 25 MG Tab Take 1 tab by mouth once nightly as needed. 90 Tab 2   • montelukast (SINGULAIR) 10 MG Tab Take 1 Tab by mouth every day. 30 Tab 5   • albuterol 108 (90 Base) MCG/ACT Aero Soln inhalation aerosol Inhale 2 Puffs by  mouth every 6 hours as needed for Shortness of Breath.     • cetirizine (ZYRTEC) 10 MG Tab Take 10 mg by mouth 1 time daily as needed.       No current facility-administered medications for this visit.        Social History     Tobacco Use   • Smoking status: Never Smoker   • Smokeless tobacco: Never Used   Substance Use Topics   • Alcohol use: Yes     Comment: socially.    • Drug use: No       Family Status   Relation Name Status   • Mo  Alive   • Fa  Alive   • Bro  Alive   • MGMo     • MGFa     • PGMo  Alive   • PGFa  Alive   • Bro  Alive   • Bro  Alive   • MUnc  Alive     Family History   Problem Relation Age of Onset   • No Known Problems Mother    • No Known Problems Father    • No Known Problems Brother    • Diabetes Maternal Grandmother         Type II   • Hypertension Maternal Grandmother    • Diabetes Paternal Grandmother         Type II   • Hypertension Paternal Grandmother    • No Known Problems Brother    • GI Disease Brother         GI Ulcers   • Other Maternal Uncle         Sickle Cell        ROS:  Review of Systems   Constitutional: Negative for fever, chills, weight loss and malaise/fatigue.   HENT: Negative for ear pain, nosebleeds, congestion, sore throat and neck pain.    Eyes: Negative for blurred vision.   Respiratory: Negative for cough, sputum production, shortness of breath and wheezing.    Cardiovascular: Negative for chest pain, palpitations, orthopnea and leg swelling.   Gastrointestinal: Negative for heartburn, vomiting.  + for abdominal pain, nausea, abdominal bloating.  Genitourinary: Negative for dysuria, urgency and frequency.   Musculoskeletal: Negative for myalgias, back pain and joint pain.   Skin: Negative for rash and itching.   Neurological: Negative for dizziness, tingling, tremors, sensory change, focal weakness and headaches.   Endo/Heme/Allergies: Does not bruise/bleed easily.   Psychiatric/Behavioral: Negative for depression, suicidal ideas and memory loss.   "The patient is not nervous/anxious and does not have insomnia.    All other systems reviewed and are negative except as in HPI.    Exam: BP (!) 96/54 (BP Location: Left arm, Patient Position: Sitting, BP Cuff Size: Adult)   Pulse 86   Temp 37.2 °C (99 °F) (Temporal)   Resp (!) 24   Ht 1.626 m (5' 4\")   Wt 53.6 kg (118 lb 3.2 oz)   SpO2 91%  Body mass index is 20.29 kg/m².  General: Normal appearing. No distress.  HEENT: Normocephalic. Eyes conjunctiva clear lids without ptosis, ears normal shape and contour.  Neck: Supple without JVD . Thyroid is not enlarged.  Pulmonary: Clear to ausculation.  Normal effort. No rales, ronchi, or wheezing.  Cardiovascular: Regular rate and rhythm without murmur.   Abdomen: Soft, nontender, nondistended. Normal bowel sounds. Liver and spleen are not palpable.  Suprapubic region is nontender to palpation.  Negative for CVA tenderness.  Neurologic: Grossly nonfocal.  Cranial nerves are normal.   Lymph: No cervical or supraclavicular  lymph nodes are palpable  Skin: Warm and dry.  No rashes or suspicious skin lesions.  Musculoskeletal: Normal gait. No extremity cyanosis, clubbing, or edema.  Psych: Normal mood and affect. Alert and oriented x3. Judgment and insight is normal.    Medical decision-making and discussion:  1. Pelvic pain in female  Pelvic ultrasound has been ordered to further evaluate patient.  Patient be contacted with results.  Lab work has been ordered as well.  Advised patient to journal symptoms.  Patient will follow-up in 2 weeks for evaluation.  Patient admits that she is been under a lot of stress.  Advised patient continue work on diet, exercise, sleep hygiene, developing healthy coping mechanisms for stress and anxiety.  Advised patient to continue Nexium.   Avoid known triggers if possible.  Patient to keep a diary.    - US-PELVIC COMPLETE (TRANSABDOMINAL/TRANSVAGINAL) (COMBO); Future  - Comp Metabolic Panel; Future    2. Abdominal bloating  Same as # " 1.      Please note that this dictation was created using voice recognition software. I have made every reasonable attempt to correct obvious errors, but I expect that there are errors of grammar and possibly content that I did not discover before finalizing the note.    Assessment/Plan:  1. Pelvic pain in female  US-PELVIC COMPLETE (TRANSABDOMINAL/TRANSVAGINAL) (COMBO)    Comp Metabolic Panel   2. Abdominal bloating         Return in about 2 weeks (around 9/17/2020), or if symptoms worsen or fail to improve.

## 2020-09-17 DIAGNOSIS — Z00.00 WELLNESS EXAMINATION: ICD-10-CM

## 2020-09-21 ENCOUNTER — NON-PROVIDER VISIT (OUTPATIENT)
Dept: MEDICAL GROUP | Facility: MEDICAL CENTER | Age: 25
End: 2020-09-21

## 2020-09-21 DIAGNOSIS — Z23 NEED FOR VACCINATION: ICD-10-CM

## 2020-09-21 PROCEDURE — 90686 IIV4 VACC NO PRSV 0.5 ML IM: CPT | Performed by: FAMILY MEDICINE

## 2020-09-21 PROCEDURE — 90471 IMMUNIZATION ADMIN: CPT | Performed by: FAMILY MEDICINE

## 2020-09-21 NOTE — PROGRESS NOTES
"Alicia Tejada is a 25 y.o. female here for a non-provider visit for:   FLU    Reason for immunization: Annual Flu Vaccine  Immunization records indicate need for vaccine: Yes, confirmed with [facility]  Minimum interval has been met for this vaccine: Yes  ABN completed: Yes    Order and dose verified by: BASILIO BAUER Dated  8/15/19 was given to patient: Yes  All IAC Questionnaire questions were answered \"No.\"    Patient tolerated injection and no adverse effects were observed or reported: Yes    Pt scheduled for next dose in series: No    "

## 2020-09-23 ENCOUNTER — HOSPITAL ENCOUNTER (OUTPATIENT)
Dept: LAB | Facility: MEDICAL CENTER | Age: 25
End: 2020-09-23
Attending: PHYSICIAN ASSISTANT
Payer: COMMERCIAL

## 2020-09-23 DIAGNOSIS — D57.1 HB-SS DISEASE WITHOUT CRISIS (HCC): ICD-10-CM

## 2020-09-23 DIAGNOSIS — Z00.00 WELLNESS EXAMINATION: ICD-10-CM

## 2020-09-23 DIAGNOSIS — R10.2 PELVIC PAIN IN FEMALE: ICD-10-CM

## 2020-09-23 LAB
ALBUMIN SERPL BCP-MCNC: 4.5 G/DL (ref 3.2–4.9)
ALBUMIN/GLOB SERPL: 1.3 G/DL
ALP SERPL-CCNC: 50 U/L (ref 30–99)
ALT SERPL-CCNC: 10 U/L (ref 2–50)
ANION GAP SERPL CALC-SCNC: 11 MMOL/L (ref 7–16)
ANISOCYTOSIS BLD QL SMEAR: ABNORMAL
AST SERPL-CCNC: 47 U/L (ref 12–45)
BILIRUB SERPL-MCNC: 2.4 MG/DL (ref 0.1–1.5)
BUN SERPL-MCNC: 9 MG/DL (ref 8–22)
CALCIUM SERPL-MCNC: 8.9 MG/DL (ref 8.4–10.2)
CHLORIDE SERPL-SCNC: 105 MMOL/L (ref 96–112)
CHOLEST SERPL-MCNC: 148 MG/DL (ref 100–199)
CO2 SERPL-SCNC: 23 MMOL/L (ref 20–33)
CREAT SERPL-MCNC: 0.33 MG/DL (ref 0.5–1.4)
ERYTHROCYTE [DISTWIDTH] IN BLOOD BY AUTOMATED COUNT: 76.9 FL (ref 35.9–50)
FASTING STATUS PATIENT QL REPORTED: NORMAL
FERRITIN SERPL-MCNC: 66.5 NG/ML (ref 10–291)
GLOBULIN SER CALC-MCNC: 3.4 G/DL (ref 1.9–3.5)
GLUCOSE SERPL-MCNC: 88 MG/DL (ref 65–99)
HCT VFR BLD AUTO: 19.2 % (ref 37–47)
HDLC SERPL-MCNC: 49 MG/DL
HGB BLD-MCNC: 6.9 G/DL (ref 12–16)
HYPOCHROMIA BLD QL SMEAR: ABNORMAL
IRON SATN MFR SERPL: 39 % (ref 15–55)
IRON SERPL-MCNC: 95 UG/DL (ref 40–170)
LDLC SERPL CALC-MCNC: 77 MG/DL
MACROCYTES BLD QL SMEAR: ABNORMAL
MCH RBC QN AUTO: 30 PG (ref 27–33)
MCHC RBC AUTO-ENTMCNC: 35.9 G/DL (ref 33.6–35)
MCV RBC AUTO: 83.5 FL (ref 81.4–97.8)
MICROCYTES BLD QL SMEAR: ABNORMAL
OVALOCYTES BLD QL SMEAR: ABNORMAL
PLATELET # BLD AUTO: 454 K/UL (ref 164–446)
PLATELET BLD QL SMEAR: NORMAL
PMV BLD AUTO: 9.7 FL (ref 9–12.9)
POIKILOCYTOSIS BLD QL SMEAR: ABNORMAL
POLYCHROMASIA BLD QL SMEAR: ABNORMAL
POTASSIUM SERPL-SCNC: 4.4 MMOL/L (ref 3.6–5.5)
PROT SERPL-MCNC: 7.9 G/DL (ref 6–8.2)
RBC # BLD AUTO: 2.3 M/UL (ref 4.2–5.4)
RBC BLD AUTO: PRESENT
SCHISTOCYTES BLD QL SMEAR: ABNORMAL
SICKLE CELLS BLD QL SMEAR: ABNORMAL
SODIUM SERPL-SCNC: 139 MMOL/L (ref 135–145)
TARGETS BLD QL SMEAR: ABNORMAL
TIBC SERPL-MCNC: 241 UG/DL (ref 250–450)
TRIGL SERPL-MCNC: 110 MG/DL (ref 0–149)
UIBC SERPL-MCNC: 146 UG/DL (ref 110–370)
WBC # BLD AUTO: 11.9 K/UL (ref 4.8–10.8)

## 2020-09-23 PROCEDURE — 83550 IRON BINDING TEST: CPT

## 2020-09-23 PROCEDURE — 36415 COLL VENOUS BLD VENIPUNCTURE: CPT

## 2020-09-23 PROCEDURE — 85027 COMPLETE CBC AUTOMATED: CPT

## 2020-09-23 PROCEDURE — 80061 LIPID PANEL: CPT

## 2020-09-23 PROCEDURE — 82728 ASSAY OF FERRITIN: CPT

## 2020-09-23 PROCEDURE — 80053 COMPREHEN METABOLIC PANEL: CPT

## 2020-09-23 PROCEDURE — 83540 ASSAY OF IRON: CPT

## 2020-09-29 ENCOUNTER — OFFICE VISIT (OUTPATIENT)
Dept: MEDICAL GROUP | Facility: PHYSICIAN GROUP | Age: 25
End: 2020-09-29
Payer: COMMERCIAL

## 2020-09-29 VITALS
HEIGHT: 64 IN | DIASTOLIC BLOOD PRESSURE: 50 MMHG | WEIGHT: 119.4 LBS | HEART RATE: 78 BPM | OXYGEN SATURATION: 92 % | SYSTOLIC BLOOD PRESSURE: 100 MMHG | BODY MASS INDEX: 20.38 KG/M2 | RESPIRATION RATE: 20 BRPM | TEMPERATURE: 98.5 F

## 2020-09-29 DIAGNOSIS — R17 TOTAL BILIRUBIN, ELEVATED: ICD-10-CM

## 2020-09-29 DIAGNOSIS — D57.1 HB-SS DISEASE WITHOUT CRISIS (HCC): ICD-10-CM

## 2020-09-29 DIAGNOSIS — R10.2 PELVIC PAIN IN FEMALE: ICD-10-CM

## 2020-09-29 DIAGNOSIS — D50.8 IRON DEFICIENCY ANEMIA SECONDARY TO INADEQUATE DIETARY IRON INTAKE: ICD-10-CM

## 2020-09-29 PROCEDURE — 99214 OFFICE O/P EST MOD 30 MIN: CPT | Performed by: PHYSICIAN ASSISTANT

## 2020-09-29 ASSESSMENT — FIBROSIS 4 INDEX: FIB4 SCORE: 0.82

## 2020-10-20 ENCOUNTER — GYNECOLOGY VISIT (OUTPATIENT)
Dept: OBGYN | Facility: CLINIC | Age: 25
End: 2020-10-20
Payer: COMMERCIAL

## 2020-10-20 VITALS
BODY MASS INDEX: 19.73 KG/M2 | SYSTOLIC BLOOD PRESSURE: 117 MMHG | HEIGHT: 65 IN | WEIGHT: 118.4 LBS | DIASTOLIC BLOOD PRESSURE: 65 MMHG

## 2020-10-20 DIAGNOSIS — Z30.017 NEXPLANON INSERTION: ICD-10-CM

## 2020-10-20 LAB
INT CON NEG: NEGATIVE
INT CON POS: POSITIVE
POC URINE PREGNANCY TEST: NEGATIVE

## 2020-10-20 PROCEDURE — 81025 URINE PREGNANCY TEST: CPT | Performed by: OBSTETRICS & GYNECOLOGY

## 2020-10-20 PROCEDURE — 11981 INSERTION DRUG DLVR IMPLANT: CPT | Performed by: OBSTETRICS & GYNECOLOGY

## 2020-10-20 SDOH — HEALTH STABILITY: MENTAL HEALTH: HOW OFTEN DO YOU HAVE A DRINK CONTAINING ALCOHOL?: MONTHLY OR LESS

## 2020-10-20 ASSESSMENT — FIBROSIS 4 INDEX: FIB4 SCORE: 0.82

## 2020-10-20 NOTE — NON-PROVIDER
Pt here as New Pt for Gyn exam  Good Phone #:[phone]  Pharmacy verified.  Last Pap:11/29/2018, neg result in Labs  LMP:9/26/2020  Pt states would like to establish care.  Pt states would like a Nexplanon insert today.  UPT negative, done in clinic.

## 2020-10-20 NOTE — PROGRESS NOTES
Chief complaint: Nexplanon insertion                                 Procedure Note : Nexplanon Insertion :     Today I discussed with the patient subdermal implant, Nexplanon.  We discussed its mechanism of action and how it prevents pregnancy  Discussed that the implant is a progesterone only form of contraception.  Also discussed with patient risks associated with placement of the device which can include infection bruising and pain. We also discussed the possibility of the device can be displaced. We discussed that the device would be palpable under the skin of the arm.  I also discussed with the patient side effects of the device which can include but are not limited to, irregular bleeding which can include amenorrhea, irregular spotting and also worsening of menstrual cycles. There is an increased risk of headaches related to the device and potential for weight gain.  After thorough discussion the patient had opportunity to ask questions regarding the device and at this time desires the device to be placed today.  Patient information handout has been given for the device.    After informed consent and discussion of risks and benefits, patient who describes her dominant hand as left, was prepped and draped to expose the inner medial surface of the upper right  Insertion site marked at 8 cm distal to the medial epicondyle  1% lidocaine placed sub-Q along course of implant . Area totally anesthestized.   Nexplanon applicator placed with bevel of needle down and skin lifted and inserted to hub. Trigger fired and nexplanon released.  Implant palpated by myself and patient in proper location.   Small needle puncture hemostatic   Steri Strips placed   Arm wrapped with self sealing gauze     Patient given Postoperative Advice  Take NSAIDS and tylenol for pain, ice packs prn bruising/discomfort  Remove gauze wrap after 24 hrs, or at anytime it becomes uncomfortable   Steri Strips to be removed at 3-4 days  RTC as  needed

## 2020-10-21 NOTE — PROGRESS NOTES
Chief Complaint   Patient presents with   • Follow-Up     GI issues        HISTORY OF PRESENT ILLNESS: Alicia Tejada is an established 25 y.o. female here to discuss the evaluation and management of:    Patient is a pleasant 25-year-old female here today to follow-up on pelvic pain symptoms.  She was last seen by me on 9/3/2020.  Pelvic ultrasound was ordered indicated the  followin.  Probable small RIGHT paraovarian cyst measuring 1.8 cm.  2.  Probable involuting LEFT ovarian follicle.  3.  No evidence for ovarian torsion.  4.  Trace free fluid, likely physiologic.    Patient states since her last appointment pelvic pain symptoms have improved.  She feels that it stress related.  She has noticed if she is stressed then she experiences abdominal pain and nausea.  States she is been working on coping mechanisms.  Tells me that she has been working on her diet and abdominal bloating has decreased.    Patient has sickle cell anemia.  She follows up with hematology but has not been seen by them in a year.  Lab work was completed and look similar to past lab work results.  Discussed with patient it appears that she may have iron deficiency anemia.  Additional lab work has been ordered.  Patient will complete lab work.  Patient admits that she needs to work on hydration.      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    Current Outpatient Medications   Medication Sig Dispense Refill   • citalopram (CELEXA) 40 MG Tab Take 1 Tab by mouth every day. 90 Tab 1   • hydrOXYzine HCl (ATARAX) 25 MG Tab Take 1 tab by mouth once nightly as needed. 90 Tab 2   • montelukast (SINGULAIR) 10 MG Tab Take 1 Tab by mouth every day. 30 Tab 5   •  albuterol 108 (90 Base) MCG/ACT Aero Soln inhalation aerosol Inhale 2 Puffs by mouth every 6 hours as needed for Shortness of Breath.     • cetirizine (ZYRTEC) 10 MG Tab Take 10 mg by mouth 1 time daily as needed.       No current facility-administered medications for this visit.        Social History     Tobacco Use   • Smoking status: Never Smoker   • Smokeless tobacco: Never Used   Substance Use Topics   • Alcohol use: Yes     Frequency: Monthly or less     Comment: socially.    • Drug use: Yes     Types: Marijuana       Family Status   Relation Name Status   • Mo  Alive   • Fa  Alive   • Bro  Alive   • MGMo     • MGFa     • PGMo  Alive   • PGFa  Alive   • Bro  Alive   • Bro  Alive   • MUnc  Alive     Family History   Problem Relation Age of Onset   • No Known Problems Mother    • No Known Problems Father    • No Known Problems Brother    • Diabetes Maternal Grandmother         Type II   • Hypertension Maternal Grandmother    • Diabetes Paternal Grandmother         Type II   • Hypertension Paternal Grandmother    • No Known Problems Brother    • GI Disease Brother         GI Ulcers   • Other Maternal Uncle         Sickle Cell        ROS:  Review of Systems   Constitutional: Negative for fever, chills, weight loss and malaise/fatigue.   HENT: Negative for ear pain, nosebleeds, congestion, sore throat and neck pain.    Eyes: Negative for blurred vision.   Respiratory: Negative for cough, sputum production, shortness of breath and wheezing.    Cardiovascular: Negative for chest pain, palpitations, orthopnea and leg swelling.   Gastrointestinal: Negative for heartburn, nausea, vomiting and abdominal pain.   Genitourinary: Negative for dysuria, urgency and frequency.   Musculoskeletal: Negative for myalgias, back pain and joint pain.   Skin: Negative for rash and itching.   Neurological: Negative for dizziness, tingling, tremors, sensory change, focal weakness and headaches.   Endo/Heme/Allergies: Does  "not bruise/bleed easily.   Psychiatric/Behavioral: Negative for depression, suicidal ideas and memory loss.  The patient is not nervous/anxious and does not have insomnia.    All other systems reviewed and are negative except as in HPI.    Exam: /50 (BP Location: Left arm, Patient Position: Sitting, BP Cuff Size: Small adult)   Pulse 78   Temp 36.9 °C (98.5 °F) (Temporal)   Resp 20   Ht 1.626 m (5' 4\")   Wt 54.2 kg (119 lb 6.4 oz)   SpO2 92%  Body mass index is 20.49 kg/m².  General: Normal appearing. No distress.  HEENT: Normocephalic. Eyes conjunctiva clear lids without ptosis,ears normal shape and contour.  Neck: Supple without JVD. Thyroid is not enlarged.  Pulmonary: Clear to ausculation.  Normal effort. No rales, ronchi, or wheezing.  Cardiovascular: Regular rate and rhythm without murmur.   Abdomen: Soft, nontender, nondistended. Normal bowel sounds. Liver and spleen are not palpable  Neurologic: Grossly nonfocal.  Cranial nerves are normal.   Lymph: No cervical or supraclavicular lymph nodes are palpable  Skin: Warm and dry.  No rashes or suspicious skin lesions.  Musculoskeletal: Normal gait. No extremity cyanosis, clubbing, or edema.  Psych: Normal mood and affect. Alert and oriented x3. Judgment and insight is normal.    Medical decision-making and discussion:  1. Iron deficiency anemia secondary to inadequate dietary iron intake  Lab work has been ordered.  Patient be contacted with results.  Increase dietary iron.  Continue to monitor.    Patient will be advised to take over-the-counter iron septation if lab work results indicate that patient needs to.  Patient read the plan.    - CBC WITH DIFFERENTIAL; Future  - IRON/TOTAL IRON BIND; Future  - FERRITIN; Future    2. Total bilirubin, elevated  Patient has been advised to repeat lab work.  Slight elevation of total bilirubin.  Continue to monitor.    - Comp Metabolic Panel; Future    3. 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  Advised patient to follow-up with hematology.  Discussed importance of staying hydrated.  Discussed emergency room precautions.    - CBC WITH DIFFERENTIAL; Future    4. Pelvic pain in female  Discussed with patient probable right ovarian cyst can be reevaluated in 3-6 months as needed.  Advised patient continue work on diet, exercise, sleep hygiene, developing healthy coping mechanisms for stress anxiety.  Continue to monitor.  Discussed emergency room precautions.    Follow-up for worsening symptoms,lack of expected recovery, or should new symptoms or problems arise.          Please note that this dictation was created using voice recognition software. I have made every reasonable attempt to correct obvious errors, but I expect that there are errors of grammar and possibly content that I did not discover before finalizing the note.    Assessment/Plan:  1. Iron deficiency anemia secondary to inadequate dietary iron intake  CBC WITH DIFFERENTIAL    IRON/TOTAL IRON BIND    FERRITIN   2. Total bilirubin, elevated  Comp Metabolic Panel   3. 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 Pelvic pain in female         No follow-ups on file.

## 2020-10-27 ENCOUNTER — TELEPHONE (OUTPATIENT)
Dept: MEDICAL GROUP | Facility: PHYSICIAN GROUP | Age: 25
End: 2020-10-27

## 2020-10-28 NOTE — TELEPHONE ENCOUNTER
Phone Number Called: [phone] (home)     Call outcome: Spoke to patient regarding message below.    Message: Pt informed, scheduled the appt for tomorrow at 4:00pm

## 2020-10-28 NOTE — TELEPHONE ENCOUNTER
"----- Message from Marlee Shearer P.A.-C. sent at 10/27/2020  4:55 PM PDT -----  Regarding: FW: Non-Urgent Medical Question  Contact: [phone]  I cannot order an x-ray without discussing this with patient.  She can do virtual appointment if she would like.    Thank you,    Lizzie CHACKO  ----- Message -----  From: Shasha Echevarria, [facility]  Sent: 10/27/2020   2:41 PM PDT  To: Marlee Shearer P.A.-C.  Subject: FW: Non-Urgent Medical Question                  Routing to PCP to advise xray request.Thank you,Shasha DANIEL  ----- Message -----  From: Alicia Tejada  Sent: 10/27/2020   2:40 PM PDT  To: Oscar Beaver Ma  Subject: Non-Urgent Medical Question                      Hello,    A couple weeks ago I \"sprained\" my left knee and it was pretty painful and swollen the day of. I've been wrapping it and elevating it as much as possible. 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 I was wondering if I could get an x-ray order just to make sure it's nothing serious? Thank you for your time!    Alicia"

## 2020-11-02 RX ORDER — DIPHENHYDRAMINE HCL 25 MG
25 TABLET ORAL ONCE
Status: CANCELLED | OUTPATIENT
Start: 2020-11-02 | End: 2020-11-02

## 2020-11-02 RX ORDER — ACETAMINOPHEN 325 MG/1
650 TABLET ORAL ONCE
Status: CANCELLED | OUTPATIENT
Start: 2020-11-02 | End: 2020-11-02

## 2020-11-02 RX ORDER — HEPARIN SODIUM (PORCINE) LOCK FLUSH IV SOLN 100 UNIT/ML 100 UNIT/ML
500 SOLUTION INTRAVENOUS PRN
Status: CANCELLED | OUTPATIENT
Start: 2020-11-02

## 2020-11-02 RX ORDER — LIDOCAINE HYDROCHLORIDE 10 MG/ML
20 INJECTION, SOLUTION INFILTRATION; PERINEURAL
Status: CANCELLED | OUTPATIENT
Start: 2020-11-02

## 2020-11-02 RX ORDER — ACETAMINOPHEN 325 MG/1
650 TABLET ORAL PRN
Status: CANCELLED | OUTPATIENT
Start: 2020-11-02

## 2020-11-02 RX ORDER — SODIUM CHLORIDE 9 MG/ML
500 INJECTION, SOLUTION INTRAVENOUS ONCE
Status: CANCELLED | OUTPATIENT
Start: 2020-11-02 | End: 2020-11-02

## 2020-12-20 DIAGNOSIS — Z23 NEED FOR VACCINATION: ICD-10-CM

## 2020-12-21 ENCOUNTER — APPOINTMENT (OUTPATIENT)
Dept: FAMILY PLANNING/WOMEN'S HEALTH CLINIC | Facility: IMMUNIZATION CENTER | Age: 25
End: 2020-12-21
Attending: FAMILY MEDICINE
Payer: COMMERCIAL

## 2020-12-21 ENCOUNTER — PATIENT MESSAGE (OUTPATIENT)
Dept: HEMATOLOGY ONCOLOGY | Facility: MEDICAL CENTER | Age: 25
End: 2020-12-21

## 2020-12-21 DIAGNOSIS — Z23 NEED FOR VACCINATION: ICD-10-CM

## 2020-12-21 PROCEDURE — 0001A PFIZER SARS-COV-2 VACCINE: CPT

## 2020-12-21 PROCEDURE — 91300 PFIZER SARS-COV-2 VACCINE: CPT

## 2020-12-21 NOTE — PATIENT COMMUNICATION
Pt called into the office in regards to the Covid-19 Vaccine. PT would like to speak to Dr. Morin's MA. Informed patient that MA was not available and I would relay a message. Pt would like a call back when possible as they currently have an appt to receive the vaccine at 5pm. Pt wants to be sure that it is okay to receive the vaccine.

## 2020-12-21 NOTE — PATIENT COMMUNICATION
Called pt and informed her that I spoke with Dr. Morin and he says there is no contraindication to her sickle cell anemia from the vaccine. Informed pt that Dr. Morin strongly recommends it. Pt agreed and verbalized understanding.

## 2020-12-22 ENCOUNTER — IMMUNIZATION (OUTPATIENT)
Dept: FAMILY PLANNING/WOMEN'S HEALTH CLINIC | Facility: IMMUNIZATION CENTER | Age: 25
End: 2020-12-22
Payer: COMMERCIAL

## 2020-12-22 DIAGNOSIS — Z23 ENCOUNTER FOR VACCINATION: Primary | ICD-10-CM

## 2021-01-12 ENCOUNTER — IMMUNIZATION (OUTPATIENT)
Dept: FAMILY PLANNING/WOMEN'S HEALTH CLINIC | Facility: IMMUNIZATION CENTER | Age: 26
End: 2021-01-12
Attending: FAMILY MEDICINE
Payer: COMMERCIAL

## 2021-01-12 DIAGNOSIS — Z23 ENCOUNTER FOR VACCINATION: Primary | ICD-10-CM

## 2021-01-12 PROCEDURE — 0002A PFIZER SARS-COV-2 VACCINE: CPT

## 2021-01-12 PROCEDURE — 91300 PFIZER SARS-COV-2 VACCINE: CPT

## 2021-02-05 ENCOUNTER — TELEMEDICINE (OUTPATIENT)
Dept: MEDICAL GROUP | Facility: PHYSICIAN GROUP | Age: 26
End: 2021-02-05
Payer: COMMERCIAL

## 2021-02-05 VITALS
HEART RATE: 76 BPM | RESPIRATION RATE: 16 BRPM | SYSTOLIC BLOOD PRESSURE: 108 MMHG | WEIGHT: 116 LBS | BODY MASS INDEX: 19.33 KG/M2 | HEIGHT: 65 IN | DIASTOLIC BLOOD PRESSURE: 68 MMHG

## 2021-02-05 DIAGNOSIS — Z11.3 SCREENING FOR STD (SEXUALLY TRANSMITTED DISEASE): ICD-10-CM

## 2021-02-05 DIAGNOSIS — F41.9 ANXIETY: ICD-10-CM

## 2021-02-05 DIAGNOSIS — D57.1 HB-SS DISEASE WITHOUT CRISIS (HCC): ICD-10-CM

## 2021-02-05 DIAGNOSIS — R53.82 CHRONIC FATIGUE: ICD-10-CM

## 2021-02-05 DIAGNOSIS — N83.201 RIGHT OVARIAN CYST: ICD-10-CM

## 2021-02-05 PROCEDURE — 99214 OFFICE O/P EST MOD 30 MIN: CPT | Mod: 95,CR | Performed by: PHYSICIAN ASSISTANT

## 2021-02-05 RX ORDER — CITALOPRAM 40 MG/1
40 TABLET ORAL DAILY
Qty: 90 TAB | Refills: 3 | Status: SHIPPED | OUTPATIENT
Start: 2021-02-05 | End: 2022-02-18 | Stop reason: SDUPTHER

## 2021-02-05 ASSESSMENT — FIBROSIS 4 INDEX: FIB4 SCORE: 0.82

## 2021-02-05 ASSESSMENT — PATIENT HEALTH QUESTIONNAIRE - PHQ9: CLINICAL INTERPRETATION OF PHQ2 SCORE: 0

## 2021-02-05 NOTE — PROGRESS NOTES
Virtual Visit: Established Patient   This visit was conducted via Zoom using secure and encrypted videoconferencing technology. The patient was in a private location in the state of [place].    The patient's identity was confirmed and verbal consent was obtained for this virtual visit.    Subjective:   CC: Requesting labs and like to discuss fatigue  Chief Complaint   Patient presents with   • Requesting Labs   • Fatigue     2 months        Alicia Tejada is a 25 y.o. female presenting for evaluation and management of:    Patient is a pleasant 25-year-old female here today requesting lab work and would like to discuss fatigue.  For the past 2 months he has been feeling tired and rundown.  She feels that she is sleeping more than usual.  After waking up she still feeling tired.  States intermittently she will also feel dizzy and nauseous.  Patient denies being pregnant.  States she has a Nexplanon and it was placed in October 2020.  She is sexually active in a monogamous relationship.  Denies condom use.  Last menstrual period was on January 22 lasted 4 days in duration.  States her usual cycles coming every 3 weeks and is normally 4 days in duration.  Positive for light to moderate bleeding.  Patient has sickle cell anemia and has followed up with hematology 9/12/2019.  Patient is aware that she needs to follow-up.  He has been working on hydration and is trying to stay hydrated.  She does not take an over-the-counter multivitamin or other supplementation.   States she has been stressed.  Patient is in nursing school.    Patient is requesting STD lab work.  She is asymptomatic.    Patient would like to repeat transvaginal ultrasound.  9/6/2020 indicated patient had a right ovarian cyst that was 1.8 cm in size.  Patient is asymptomatic.    ROS   Denies any recent fevers or chills. No nausea or vomiting. No chest pains or shortness of breath.     No Known Allergies    Current medicines (including changes  today)  Current Outpatient Medications   Medication Sig Dispense Refill   • citalopram (CELEXA) 40 MG Tab Take 1 Tab by mouth every day. 90 Tab 3   • hydrOXYzine HCl (ATARAX) 25 MG Tab Take 1 tab by mouth once nightly as needed. 90 Tab 2   • montelukast (SINGULAIR) 10 MG Tab Take 1 Tab by mouth every day. 30 Tab 5   • albuterol 108 (90 Base) MCG/ACT Aero Soln inhalation aerosol Inhale 2 Puffs by mouth every 6 hours as needed for Shortness of Breath.       No current facility-administered medications for this visit.        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 She also has no past medical history of Addisons disease ([facility]), Adrenal disorder ([facility]), Arrhythmia, Arthritis, Blood transfusion without reported diagnosis, Cancer ([facility]), Cataract, CHF (congestive heart failure) ([facility]), Clotting disorder ([facility]), COPD (chronic obstructive pulmonary disease) ([facility]), Cushings syndrome ([facility]), Depression, Diabetes ([facility]), Diabetic neuropathy ([facility]), GERD (gastroesophageal reflux disease), Glaucoma, Goiter, Head ache, Heart attack ([facility]), Heart murmur, HIV (human immunodeficiency virus infection) ([facility]), Hyperlipidemia, Hypertension, IBD (inflammatory bowel disease), Kidney  "disease, Meningitis, Migraine, Muscle disorder, Osteoporosis, Parathyroid disorder (HCC), Pituitary disease (HCC), Pulmonary emphysema (HCC), Seizure (HCC), Stroke (HCC), Substance abuse (HCC), Thyroid disease, Tuberculosis, or Urinary tract infection.  She  has a past surgical history that includes tonsillectomy and trev by laparoscopy (1/15/2019).       Objective:   /68 (BP Location: Left arm, Patient Position: Sitting, BP Cuff Size: Adult)   Pulse 76   Resp 16   Ht 1.638 m (5' 4.5\")   Wt 52.6 kg (116 lb)   BMI 19.60 kg/m²     Physical Exam:  Constitutional: Alert, no distress, well-groomed.  Skin: No rashes in visible areas.  Eye: Round. Conjunctiva clear, lids normal. No icterus.   ENMT: Lips pink without lesions, good dentition, moist mucous membranes. Phonation normal.  Neck: No masses, no thyromegaly. Moves freely without pain.  Respiratory: Unlabored respiratory effort, no cough or audible wheeze  Psych: Alert and oriented x3, normal affect and mood.       Assessment and Plan:   The following treatment plan was discussed:     1. 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  Appears to be stable.  Highly emphasized importance of following up with hematology.  Patient verbally consented that she would.  Continue work on hydration.      2. Chronic fatigue  Patient to complete lab work that was ordered in September 2020 as well as the ones I have ordered today.  Patient verbally consented that she would.  Continue work on diet, exercise, sleep hygiene, hydration.  Continue developing healthy coping mechanisms for stress anxiety.  Patient will follow up in 1 month discuss lab work results.    - VITAMIN B12; Future  - VITAMIN D,25 HYDROXY; Future  - TSH WITH REFLEX TO FT4; Future    3. Screening for STD (sexually transmitted disease)  Discussed importance of practicing safe sex.  Discussed STI risk with patient.  Lab work has been ordered.  Patient will be contacted with lab work results.  - " VAGINAL PATHOGENS DNA PANEL; Future  - HIV AG/AB COMBO ASSAY SCREENING; Future  - RPR (SYPHILIS); Future  - Chlamydia/GC PCR Urine Or Swab; Future    4. Right ovarian cyst  Ultrasound has been ordered to further evaluate patient.  Patient will be contacted with results.    - US-PELVIC COMPLETE (TRANSABDOMINAL/TRANSVAGINAL) (COMBO); Future    5. Anxiety  Medication refill.    Patient is feeling well on current medications. Will continue. Denies any suicidal or homicidal ideation. Emphasized importance of healthy diet and exercise. Discussed that should the patient have any symptoms they should call suicide prevention hotline or report to the emergency room immediately.    - citalopram (CELEXA) 40 MG Tab; Take 1 Tab by mouth every day.  Dispense: 90 Tab; Refill: 3        Follow-up: No follow-ups on file.

## 2021-02-08 ENCOUNTER — HOSPITAL ENCOUNTER (OUTPATIENT)
Dept: LAB | Facility: MEDICAL CENTER | Age: 26
End: 2021-02-08
Attending: PHYSICIAN ASSISTANT
Payer: COMMERCIAL

## 2021-02-08 ENCOUNTER — TELEPHONE (OUTPATIENT)
Dept: HEMATOLOGY ONCOLOGY | Facility: MEDICAL CENTER | Age: 26
End: 2021-02-08

## 2021-02-08 DIAGNOSIS — R17 TOTAL BILIRUBIN, ELEVATED: ICD-10-CM

## 2021-02-08 DIAGNOSIS — D50.8 IRON DEFICIENCY ANEMIA SECONDARY TO INADEQUATE DIETARY IRON INTAKE: ICD-10-CM

## 2021-02-08 DIAGNOSIS — D57.1 HB-SS DISEASE WITHOUT CRISIS (HCC): ICD-10-CM

## 2021-02-08 DIAGNOSIS — R53.82 CHRONIC FATIGUE: ICD-10-CM

## 2021-02-08 DIAGNOSIS — Z11.3 SCREENING FOR STD (SEXUALLY TRANSMITTED DISEASE): ICD-10-CM

## 2021-02-08 LAB
ALBUMIN SERPL BCP-MCNC: 4.4 G/DL (ref 3.2–4.9)
ALBUMIN/GLOB SERPL: 1.4 G/DL
ALP SERPL-CCNC: 49 U/L (ref 30–99)
ALT SERPL-CCNC: 6 U/L (ref 2–50)
ANION GAP SERPL CALC-SCNC: 14 MMOL/L (ref 7–16)
ANISOCYTOSIS BLD QL SMEAR: ABNORMAL
AST SERPL-CCNC: 43 U/L (ref 12–45)
BASOPHILS # BLD AUTO: 0.6 % (ref 0–1.8)
BASOPHILS # BLD: 0.06 K/UL (ref 0–0.12)
BILIRUB SERPL-MCNC: 3.4 MG/DL (ref 0.1–1.5)
BUN SERPL-MCNC: 2 MG/DL (ref 8–22)
BURR CELLS BLD QL SMEAR: NORMAL
CALCIUM SERPL-MCNC: 9 MG/DL (ref 8.5–10.5)
CHLORIDE SERPL-SCNC: 103 MMOL/L (ref 96–112)
CO2 SERPL-SCNC: 23 MMOL/L (ref 20–33)
COMMENT 1642: NORMAL
CREAT SERPL-MCNC: 0.35 MG/DL (ref 0.5–1.4)
EOSINOPHIL # BLD AUTO: 0.17 K/UL (ref 0–0.51)
EOSINOPHIL NFR BLD: 1.8 % (ref 0–6.9)
ERYTHROCYTE [DISTWIDTH] IN BLOOD BY AUTOMATED COUNT: 75.3 FL (ref 35.9–50)
GLOBULIN SER CALC-MCNC: 3.2 G/DL (ref 1.9–3.5)
GLUCOSE SERPL-MCNC: 88 MG/DL (ref 65–99)
HCT VFR BLD AUTO: 22.8 % (ref 37–47)
HGB BLD-MCNC: 7.6 G/DL (ref 12–16)
HYPOCHROMIA BLD QL SMEAR: ABNORMAL
IMM GRANULOCYTES # BLD AUTO: 0.04 K/UL (ref 0–0.11)
IMM GRANULOCYTES NFR BLD AUTO: 0.4 % (ref 0–0.9)
IRON SATN MFR SERPL: 47 % (ref 15–55)
IRON SERPL-MCNC: 130 UG/DL (ref 40–170)
LYMPHOCYTES # BLD AUTO: 2.74 K/UL (ref 1–4.8)
LYMPHOCYTES NFR BLD: 28.4 % (ref 22–41)
MACROCYTES BLD QL SMEAR: ABNORMAL
MCH RBC QN AUTO: 29 PG (ref 27–33)
MCHC RBC AUTO-ENTMCNC: 33.3 G/DL (ref 33.6–35)
MCV RBC AUTO: 87 FL (ref 81.4–97.8)
MICROCYTES BLD QL SMEAR: ABNORMAL
MONOCYTES # BLD AUTO: 0.52 K/UL (ref 0–0.85)
MONOCYTES NFR BLD AUTO: 5.4 % (ref 0–13.4)
MORPHOLOGY BLD-IMP: NORMAL
NEUTROPHILS # BLD AUTO: 6.11 K/UL (ref 2–7.15)
NEUTROPHILS NFR BLD: 63.4 % (ref 44–72)
NRBC # BLD AUTO: 0.08 K/UL
NRBC BLD-RTO: 0.8 /100 WBC
PLATELET # BLD AUTO: 582 K/UL (ref 164–446)
PLATELET BLD QL SMEAR: NORMAL
PMV BLD AUTO: 11.2 FL (ref 9–12.9)
POIKILOCYTOSIS BLD QL SMEAR: NORMAL
POLYCHROMASIA BLD QL SMEAR: NORMAL
POTASSIUM SERPL-SCNC: 3.9 MMOL/L (ref 3.6–5.5)
PROT SERPL-MCNC: 7.6 G/DL (ref 6–8.2)
RBC # BLD AUTO: 2.62 M/UL (ref 4.2–5.4)
RBC BLD AUTO: PRESENT
SCHISTOCYTES BLD QL SMEAR: NORMAL
SICKLE CELLS BLD QL SMEAR: NORMAL
SODIUM SERPL-SCNC: 140 MMOL/L (ref 135–145)
TARGETS BLD QL SMEAR: NORMAL
TIBC SERPL-MCNC: 274 UG/DL (ref 250–450)
UIBC SERPL-MCNC: 144 UG/DL (ref 110–370)
WBC # BLD AUTO: 9.6 K/UL (ref 4.8–10.8)

## 2021-02-08 PROCEDURE — 82607 VITAMIN B-12: CPT

## 2021-02-08 PROCEDURE — 87491 CHLMYD TRACH DNA AMP PROBE: CPT

## 2021-02-08 PROCEDURE — 87389 HIV-1 AG W/HIV-1&-2 AB AG IA: CPT

## 2021-02-08 PROCEDURE — 36415 COLL VENOUS BLD VENIPUNCTURE: CPT

## 2021-02-08 PROCEDURE — 84443 ASSAY THYROID STIM HORMONE: CPT

## 2021-02-08 PROCEDURE — 83550 IRON BINDING TEST: CPT

## 2021-02-08 PROCEDURE — 86780 TREPONEMA PALLIDUM: CPT

## 2021-02-08 PROCEDURE — 87591 N.GONORRHOEAE DNA AMP PROB: CPT

## 2021-02-08 PROCEDURE — 80053 COMPREHEN METABOLIC PANEL: CPT

## 2021-02-08 PROCEDURE — 82306 VITAMIN D 25 HYDROXY: CPT

## 2021-02-08 PROCEDURE — 82728 ASSAY OF FERRITIN: CPT

## 2021-02-08 PROCEDURE — 83540 ASSAY OF IRON: CPT

## 2021-02-08 PROCEDURE — 85025 COMPLETE CBC W/AUTO DIFF WBC: CPT

## 2021-02-08 NOTE — TELEPHONE ENCOUNTER
Pt called in requesting appt with Dr. Morin. Informed pt that Dr. Morin suggested she follow up PRN, pt stated her PCP suggested she follow up yearly. Pt stated she had some labs done today and would feel better having an appt to be seen.

## 2021-02-09 LAB
25(OH)D3 SERPL-MCNC: 13 NG/ML (ref 30–100)
C TRACH DNA SPEC QL NAA+PROBE: NEGATIVE
FERRITIN SERPL-MCNC: 53.3 NG/ML (ref 10–291)
HIV 1+2 AB+HIV1 P24 AG SERPL QL IA: NORMAL
N GONORRHOEA DNA SPEC QL NAA+PROBE: NEGATIVE
SPECIMEN SOURCE: NORMAL
TREPONEMA PALLIDUM IGG+IGM AB [PRESENCE] IN SERUM OR PLASMA BY IMMUNOASSAY: NORMAL
TSH SERPL DL<=0.005 MIU/L-ACNC: 1.46 UIU/ML (ref 0.38–5.33)
VIT B12 SERPL-MCNC: 502 PG/ML (ref 211–911)

## 2021-02-10 ENCOUNTER — HOSPITAL ENCOUNTER (OUTPATIENT)
Facility: MEDICAL CENTER | Age: 26
End: 2021-02-10
Attending: PHYSICIAN ASSISTANT
Payer: COMMERCIAL

## 2021-02-10 DIAGNOSIS — Z11.3 SCREENING FOR STD (SEXUALLY TRANSMITTED DISEASE): ICD-10-CM

## 2021-02-10 PROCEDURE — 87510 GARDNER VAG DNA DIR PROBE: CPT

## 2021-02-10 PROCEDURE — 87480 CANDIDA DNA DIR PROBE: CPT

## 2021-02-10 PROCEDURE — 87660 TRICHOMONAS VAGIN DIR PROBE: CPT

## 2021-02-18 NOTE — PROGRESS NOTES
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 Not on hydroxyurea. H/H 7.6/22.8 which is normal for her. Has not required a TX in he past year and a half since I last saw her. Is concerned about a low BUN and creatinine. No physical complaints except moderate fatigue.     ROS:    Constitutional: No weight loss  Skin: No rash or jaundice  HENT: No change in eyesight or hearing  Cardiovascular:No chest pain or arrythmia  Respiratory:No cough or SOB  GI:No nausea, vomiting, diarrhea, constipation  :No dysuria or frequency  Musculoskeletal:No bone or joint pain  Neuro:No sx's of neuropathy  Psych: No complaints    PMH:      No Known Allergies    Past Medical History:   Diagnosis Date   • Anemia    • Anxiety    • Asthma     Inhaler use PRN.   • Sickle cell anemia (HCC)    • Snoring     Negative sleep study.        Past Surgical History:   Procedure Laterality Date   • ANUP BY LAPAROSCOPY  1/15/2019    Procedure: laparoscopic cholecystectomy;  Surgeon: Noel Greene M.D.;  Location: SURGERY [facility];  Service: General   • TONSILLECTOMY          Medications:    Current Outpatient Medications on File Prior to Visit   Medication Sig Dispense Refill   • citalopram (CELEXA) 40 MG Tab Take 1 Tab by mouth every day. 90 Tab 3   • hydrOXYzine HCl (ATARAX) 25 MG Tab Take 1 tab by mouth once nightly as needed. 90 Tab 2   • montelukast (SINGULAIR) 10 MG Tab Take 1 Tab by mouth every day. 30 Tab 5   • albuterol 108 (90 Base) MCG/ACT Aero Soln inhalation aerosol Inhale 2 Puffs by mouth every 6 hours as needed for Shortness of Breath.       No current facility-administered medications on file prior to visit.       Social History     Tobacco Use   • Smoking status: Never Smoker   • Smokeless tobacco: Never Used   Substance Use Topics   • Alcohol use: Yes     Comment: socially.         Family History   Problem Relation Age of Onset   • No  "Known Problems Mother    • No Known Problems Father    • No Known Problems Brother    • Diabetes Maternal Grandmother         Type II   • Hypertension Maternal Grandmother    • Diabetes Paternal Grandmother         Type II   • Hypertension Paternal Grandmother    • No Known Problems Brother    • GI Disease Brother         GI Ulcers   • Other Maternal Uncle         Sickle Cell         Objective    Vitals:    /52 (BP Location: Left arm, Patient Position: Sitting, BP Cuff Size: Small adult)   Pulse 94   Temp 36.6 °C (97.8 °F) (Temporal)   Resp 16   Ht 1.68 m (5' 6.14\")   Wt 54.2 kg (119 lb 6.1 oz)   SpO2 94%   BMI 19.19 kg/m²     Physical Exam:    Appears well-developed and well-nourished. No distress.    Head -  Normocephalic .   Eyes - Pupils are equal.t. Conjunctivae and EOM are normal. Trace scleral icterus.   Ears - normal hearing  Neck - Normal range of motion. Neck supple. No thyromegaly  Cardiovascular - Normal rate, regular rhythm, normal heart sounds and intact distal pulses. 2-3/6 systolic murmur  Pulmonary - Normal breath sounds.  No wheeze, rales or rhonci  Breast - symmetrical. No mass on indentation.  Abdominal -Soft. No distension, tenderness, organomegaly or mass. Lap cholecystectomy scars.  Extremities-  No edema or tenderness.    Nodes - No submental, submandibular, preauricular, cervical, axillary or inguinal adenopathy.    Neurological -   Alert and oriented to person, place, and time. No focal findings  Skin - Skin is warm and dry. No rash noted. Not diaphoretic. No erythema. No pallor. No jaundice   Psychiatric -  Normal mood and affect.    Labs:    Results for TOM GROVE (MRN [id number])    Ref. Range 8/15/2019 12:06 2/21/2020 11:46 9/23/2020 08:08 2/8/2021 09:32   WBC Latest Ref Range: 4.8 - 10.8 K/uL 10.3 12.6 (H) 11.9 (H) 9.6   RBC Latest Ref Range: 4.20 - 5.40 M/uL 2.62 (L) 2.38 (L) 2.30 (L) 2.62 (L)   Hemoglobin Latest Ref Range: 12.0 - 16.0 g/dL 7.9 (L) 7.2 (L) 6.9 (L) " 7.6 (L)   Hematocrit Latest Ref Range: 37.0 - 47.0 % 23.0 (L) 21.0 (L) 19.2 (L) 22.8 (L)   MCV Latest Ref Range: 81.4 - 97.8 fL 87.8 88.2 83.5 87.0   MCH Latest Ref Range: 27.0 - 33.0 pg 30.2 30.3 30.0 29.0   MCHC Latest Ref Range: 33.6 - 35.0 g/dL 34.3 34.3 35.9 (H) 33.3 (L)   RDW Latest Ref Range: 35.9 - 50.0 fL 72.7 (H) 79.7 (H) 76.9 (H) 75.3 (H)   Platelet Count Latest Ref Range: 164 - 446 K/uL 590 (H) 586 (H) 454 (H) 582 (H)     Results for TOM GROVE (MRN [id number])    Ref. Range 1/16/2019 04:30 8/15/2019 12:06 2/21/2020 11:46 9/23/2020 08:08 2/8/2021 09:32   Sickle Cells Unknown  2+ 2+ 3+ 2+   Results for TOM GROVE (MRN [id number])    Ref. Range 2/8/2021 09:32   Ferritin Latest Ref Range: 10.0 - 291.0 ng/mL 53.3   Vitamin B12 -True Cobalamin Latest Ref Range: 211 - 911 pg/mL 502   Results for TOM GROVE (MRN [id number])    Ref. Range 2/8/2021 09:32   Sodium Latest Ref Range: 135 - 145 mmol/L 140   Potassium Latest Ref Range: 3.6 - 5.5 mmol/L 3.9   Chloride Latest Ref Range: 96 - 112 mmol/L 103   Co2 Latest Ref Range: 20 - 33 mmol/L 23   Anion Gap Latest Ref Range: 7.0 - 16.0  14.0   Glucose Latest Ref Range: 65 - 99 mg/dL 88   Bun Latest Ref Range: 8 - 22 mg/dL 2 (L)   Creatinine Latest Ref Range: 0.50 - 1.40 mg/dL 0.35 (L)   GFR If  Latest Ref Range: >60 mL/min/1.73 m 2 >60   GFR If Non  Latest Ref Range: >60 mL/min/1.73 m 2 >60   Calcium Latest Ref Range: 8.5 - 10.5 mg/dL 9.0   AST(SGOT) Latest Ref Range: 12 - 45 U/L 43   ALT(SGPT) Latest Ref Range: 2 - 50 U/L 6   Alkaline Phosphatase Latest Ref Range: 30 - 99 U/L 49   Total Bilirubin Latest Ref Range: 0.1 - 1.5 mg/dL 3.4 (H)   Albumin Latest Ref Range: 3.2 - 4.9 g/dL 4.4   Total Protein Latest Ref Range: 6.0 - 8.2 g/dL 7.6   Globulin Latest Ref Range: 1.9 - 3.5 g/dL 3.2   A-G Ratio Latest Units: g/dL 1.4   Iron Latest Ref Range: 40 - 170 ug/dL 130   Total Iron Binding Latest Ref Range: 250 - 450  ug/dL 274   % Saturation Latest Ref Range: 15 - 55 % 47   Unsat Iron Binding Latest Ref Range: 110 - 370 ug/dL 144     Assessment  Glomerular hyperfiltration is reported in sickle cell disease  Imp:    Visit Diagnosis:    1. 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

## 2021-02-19 DIAGNOSIS — R79.89 LOW BLOOD UREA NITROGEN (BUN): ICD-10-CM

## 2021-02-19 DIAGNOSIS — D57.1 HB-SS DISEASE WITHOUT CRISIS (HCC): ICD-10-CM

## 2021-02-22 ENCOUNTER — OFFICE VISIT (OUTPATIENT)
Dept: HEMATOLOGY ONCOLOGY | Facility: MEDICAL CENTER | Age: 26
End: 2021-02-22
Payer: COMMERCIAL

## 2021-02-22 VITALS
HEART RATE: 94 BPM | HEIGHT: 66 IN | TEMPERATURE: 97.8 F | DIASTOLIC BLOOD PRESSURE: 52 MMHG | BODY MASS INDEX: 19.19 KG/M2 | RESPIRATION RATE: 16 BRPM | SYSTOLIC BLOOD PRESSURE: 104 MMHG | OXYGEN SATURATION: 94 % | WEIGHT: 119.38 LBS

## 2021-02-22 DIAGNOSIS — D57.1 HB-SS DISEASE WITHOUT CRISIS (HCC): ICD-10-CM

## 2021-02-22 PROCEDURE — 99213 OFFICE O/P EST LOW 20 MIN: CPT | Performed by: INTERNAL MEDICINE

## 2021-02-22 ASSESSMENT — PAIN SCALES - GENERAL: PAINLEVEL: NO PAIN

## 2021-02-22 ASSESSMENT — FIBROSIS 4 INDEX: FIB4 SCORE: 0.75

## 2021-02-23 NOTE — PROGRESS NOTES
"  Subjective:     Alicia Tejada is a 22 y.o. female who presents for Cough (1 week) and Pharyngitis       Notes last one week of ST and coughing dry, denie wheeze, denies fever/chills now, at onset fever/chills now resolved, did get flu shot, denies nasuea/vomitint/abdpaind/iarrhearas, did have nausea at onset - resolved since. Though due to robitussin / also trying mucninex, PMH of asthma, denies PMH of bronchitis, remote PMH of pneumonia, seasonal allerg takes zyrytec, tried nasal spray flonase.     History reviewed. No pertinent past medical history.History reviewed. No pertinent surgical history.  Social History     Social History   • Marital status: Unknown     Spouse name: N/A   • Number of children: N/A   • Years of education: N/A     Occupational History   • Not on file.     Social History Main Topics   • Smoking status: Never Smoker   • Smokeless tobacco: Never Used   • Alcohol use Not on file   • Drug use: Unknown   • Sexual activity: Not on file     Other Topics Concern   • Not on file     Social History Narrative   • No narrative on file    History reviewed. No pertinent family history. Review of Systems   Constitutional: Negative for chills and fever ( resolved).   HENT: Positive for congestion and sore throat. Negative for ear pain and sinus pain.    Respiratory: Positive for cough. Negative for sputum production, shortness of breath and wheezing.    Gastrointestinal: Negative for abdominal pain, diarrhea, nausea and vomiting.   Skin: Negative for rash.   Endo/Heme/Allergies: Positive for environmental allergies.   No Known Allergies   I have worn a mask for the entire encounter with this patient.      Objective:   /84   Pulse 93   Temp 37.3 °C (99.1 °F)   Resp 14   Ht 1.6 m (5' 3\")   Wt 53.5 kg (118 lb)   SpO2 98%   BMI 20.90 kg/m²   Physical Exam   Constitutional: She is oriented to person, place, and time. She appears well-developed and well-nourished. No distress.   HENT:   Head: " Normocephalic and atraumatic.   Right Ear: Tympanic membrane, external ear and ear canal normal.   Left Ear: Tympanic membrane, external ear and ear canal normal.   Nose: Nose normal. Right sinus exhibits no maxillary sinus tenderness and no frontal sinus tenderness. Left sinus exhibits no maxillary sinus tenderness and no frontal sinus tenderness.   Mouth/Throat: Uvula is midline and mucous membranes are normal. Posterior oropharyngeal erythema ( mild PND) present. No oropharyngeal exudate, posterior oropharyngeal edema or tonsillar abscesses.   Eyes: Conjunctivae and lids are normal. Right eye exhibits no discharge. Left eye exhibits no discharge. No scleral icterus.   Neck: Neck supple.   Pulmonary/Chest: Effort normal. No respiratory distress. She has no decreased breath sounds. She has no wheezes. She has no rhonchi. She has no rales.   Musculoskeletal: Normal range of motion.   Lymphadenopathy:     She has cervical adenopathy ( mild bilat).   Neurological: She is alert and oriented to person, place, and time. She is not disoriented.   Skin: Skin is warm and dry. She is not diaphoretic. No erythema. No pallor.   Psychiatric: Her speech is normal and behavior is normal.   Nursing note and vitals reviewed.  POCT strep - NEG      Assessment/Plan:   Assessment    1. Cough  Supportive care is reviewed with patient/caregiver - recommend to push PO fluids and electrolytes, Nsaids/tylenol, netti pot/saline irrig, humidifier in home, flonase, ponaris, anthistamines, Contingent antibiotic prescription given to patient to fill upon meeting criteria of guidelines discussed.   Based on duration if s/sx persist,  take full course of Rx, take with probiotics, observe for resolution  Return to clinic with lack of resolution or progression of symptoms.  Declines work note    2. Pharyngitis, unspecified etiology    - POCT Rapid Strep A    3. Sinus congestion    - amoxicillin-clavulanate (AUGMENTIN) 875-125 MG Tab; Take 1 Tab by  mouth 2 times a day.  Dispense: 20 Tab; Refill: 0    - POCT Rapid Strep A  Differential diagnosis, natural history, supportive care, and indications for immediate follow-up discussed.       [Annual Wellness Visit] : an annual wellness visit

## 2021-02-24 ENCOUNTER — OFFICE VISIT (OUTPATIENT)
Dept: NEPHROLOGY | Facility: MEDICAL CENTER | Age: 26
End: 2021-02-24
Payer: COMMERCIAL

## 2021-02-24 VITALS
HEART RATE: 78 BPM | HEIGHT: 65 IN | SYSTOLIC BLOOD PRESSURE: 94 MMHG | BODY MASS INDEX: 20.18 KG/M2 | WEIGHT: 121.1 LBS | DIASTOLIC BLOOD PRESSURE: 56 MMHG | TEMPERATURE: 98.7 F | OXYGEN SATURATION: 93 %

## 2021-02-24 DIAGNOSIS — D57.1 HB-SS DISEASE WITHOUT CRISIS (HCC): ICD-10-CM

## 2021-02-24 DIAGNOSIS — N18.1 CKD (CHRONIC KIDNEY DISEASE), STAGE I: ICD-10-CM

## 2021-02-24 DIAGNOSIS — R17 TOTAL BILIRUBIN, ELEVATED: ICD-10-CM

## 2021-02-24 DIAGNOSIS — E55.9 VITAMIN D DEFICIENCY: ICD-10-CM

## 2021-02-24 PROCEDURE — 99203 OFFICE O/P NEW LOW 30 MIN: CPT | Performed by: INTERNAL MEDICINE

## 2021-02-24 ASSESSMENT — ENCOUNTER SYMPTOMS
HEMOPTYSIS: 0
FEVER: 0
EYES NEGATIVE: 1
FLANK PAIN: 0
COUGH: 0
ABDOMINAL PAIN: 0
SHORTNESS OF BREATH: 0
WHEEZING: 0
WEIGHT LOSS: 0
SINUS PAIN: 0
ORTHOPNEA: 0
CHILLS: 0
PALPITATIONS: 0
NAUSEA: 0
VOMITING: 0

## 2021-02-24 ASSESSMENT — FIBROSIS 4 INDEX: FIB4 SCORE: 0.75

## 2021-02-24 NOTE — PROGRESS NOTES
Subjective:      Alicia Tejada is a 25 y.o. female who presents with New Patient            HPI  Alicia is coming today for initial evaluation of CKD I  Diagnosed with Hb-SS, both parents with SS trait  Hx/of cholecystectomy due to gall bladder stones  Chronicaly elevated bilirubin.  No complaints, doing well  CKD I -stable  D/w Pt diet -encouraged to increase solid food intake/protein intake    Review of Systems   Constitutional: Negative for chills, fever, malaise/fatigue and weight loss.   HENT: Negative for congestion, hearing loss and sinus pain.    Eyes: Negative.    Respiratory: Negative for cough, hemoptysis, shortness of breath and wheezing.    Cardiovascular: Negative for chest pain, palpitations, orthopnea and leg swelling.   Gastrointestinal: Negative for abdominal pain, nausea and vomiting.   Genitourinary: Negative for dysuria, flank pain, frequency, hematuria and urgency.   Skin: Negative.    All other systems reviewed and are negative.    Past Medical History:   Diagnosis Date   • Anemia    • Anxiety    • Asthma     Inhaler use PRN.   • Sickle cell anemia (HCC)    • Snoring     Negative sleep study.       Family History   Problem Relation Age of Onset   • No Known Problems Mother    • No Known Problems Father    • No Known Problems Brother    • Diabetes Maternal Grandmother         Type II   • Hypertension Maternal Grandmother    • Diabetes Paternal Grandmother         Type II   • Hypertension Paternal Grandmother    • No Known Problems Brother    • GI Disease Brother         GI Ulcers   • Other Maternal Uncle         Sickle Cell        Social History     Socioeconomic History   • Marital status: Single     Spouse name: Not on file   • Number of children: Not on file   • Years of education: Not on file   • Highest education level: Not on file   Occupational History   • Not on file   Tobacco Use   • Smoking status: Never Smoker   • Smokeless tobacco: Never Used   Substance and Sexual Activity  "  • Alcohol use: Yes     Comment: socially.    • Drug use: Yes     Types: Marijuana   • Sexual activity: Yes     Partners: Male     Birth control/protection: Condom, Coitus Interruptus   Other Topics Concern   • Not on file   Social History Narrative   • Not on file     Social Determinants of Health     Financial Resource Strain:    • Difficulty of Paying Living Expenses:    Food Insecurity:    • Worried About Running Out of Food in the Last Year:    • Ran Out of Food in the Last Year:    Transportation Needs:    • Lack of Transportation (Medical):    • Lack of Transportation (Non-Medical):    Physical Activity:    • Days of Exercise per Week:    • Minutes of Exercise per Session:    Stress:    • Feeling of Stress :    Social Connections:    • Frequency of Communication with Friends and Family:    • Frequency of Social Gatherings with Friends and Family:    • Attends [employer] Services:    • Active Member of Clubs or Organizations:    • Attends Club or Organization Meetings:    • Marital Status:    Intimate Partner Violence:    • Fear of Current or Ex-Partner:    • Emotionally Abused:    • Physically Abused:    • Sexually Abused:           Objective:     BP (!) 94/56 (BP Location: Left arm, Patient Position: Sitting, BP Cuff Size: Adult)   Pulse 78   Temp 37.1 °C (98.7 °F) (Temporal)   Ht 1.651 m (5' 5\") Comment: pt reported  Wt 54.9 kg (121 lb 1.6 oz)   SpO2 93%   BMI 20.15 kg/m²      Physical Exam  Vitals reviewed.   Constitutional:       General: She is not in acute distress.     Appearance: Normal appearance. She is well-developed. She is not diaphoretic.   HENT:      Head: Normocephalic and atraumatic.      Nose: Nose normal.      Mouth/Throat:      Mouth: Mucous membranes are moist.      Pharynx: Oropharynx is clear.   Eyes:      General: No scleral icterus.     Extraocular Movements: Extraocular movements intact.      Conjunctiva/sclera: Conjunctivae normal.      Pupils: Pupils are equal, round, and " reactive to light.   Cardiovascular:      Rate and Rhythm: Normal rate and regular rhythm.      Pulses: Normal pulses.      Heart sounds: Normal heart sounds. No friction rub. No gallop.    Pulmonary:      Effort: Pulmonary effort is normal. No respiratory distress.      Breath sounds: Normal breath sounds. No wheezing, rhonchi or rales.   Abdominal:      General: Bowel sounds are normal. There is no distension.      Palpations: Abdomen is soft. There is no mass.      Tenderness: There is no abdominal tenderness. There is no right CVA tenderness, left CVA tenderness or guarding.   Musculoskeletal:      Right lower leg: No edema.      Left lower leg: No edema.   Skin:     General: Skin is warm.      Coloration: Skin is not jaundiced or pale.      Findings: No bruising, erythema, lesion or rash.   Neurological:      General: No focal deficit present.      Mental Status: She is alert and oriented to person, place, and time.      Cranial Nerves: No cranial nerve deficit.      Coordination: Coordination normal.   Psychiatric:         Mood and Affect: Mood normal.         Behavior: Behavior normal.         Thought Content: Thought content normal.         Judgment: Judgment normal.               Laboratory results reviewed;  D/w Pt  Lab Results   Component Value Date/Time    CREATININE 0.35 (L) 02/08/2021 09:32 AM    POTASSIUM 3.9 02/08/2021 09:32 AM       Assessment/Plan:        1. CKD (chronic kidney disease), stage I      Stable function  - Basic Metabolic Panel; Future  - URINALYSIS; Future    2. 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 Total bilirubin, elevated       F/u with GI    4. Vitamin D deficiency      Low Vit D level      To start 5000 units daily  - VITAMIN D 25-HYDROXY    Recs:  Vit D 5000 units daily  Keep well hydrated  Increase protein intake  Low salt diet  F/u in 3 months  Thank you for the consult

## 2021-03-01 ENCOUNTER — APPOINTMENT (OUTPATIENT)
Dept: RADIOLOGY | Facility: MEDICAL CENTER | Age: 26
End: 2021-03-01
Attending: PHYSICIAN ASSISTANT
Payer: COMMERCIAL

## 2021-03-10 ENCOUNTER — HOSPITAL ENCOUNTER (OUTPATIENT)
Dept: LAB | Facility: MEDICAL CENTER | Age: 26
End: 2021-03-10
Attending: EMERGENCY MEDICINE
Payer: COMMERCIAL

## 2021-03-11 ENCOUNTER — APPOINTMENT (OUTPATIENT)
Dept: RADIOLOGY | Facility: MEDICAL CENTER | Age: 26
End: 2021-03-11
Attending: EMERGENCY MEDICINE
Payer: COMMERCIAL

## 2021-03-11 ENCOUNTER — HOSPITAL ENCOUNTER (OUTPATIENT)
Facility: MEDICAL CENTER | Age: 26
End: 2021-03-12
Attending: EMERGENCY MEDICINE | Admitting: HOSPITALIST
Payer: COMMERCIAL

## 2021-03-11 DIAGNOSIS — D57.00 HB-SS DISEASE WITH CRISIS (HCC): ICD-10-CM

## 2021-03-11 DIAGNOSIS — R11.2 NAUSEA AND VOMITING, INTRACTABILITY OF VOMITING NOT SPECIFIED, UNSPECIFIED VOMITING TYPE: ICD-10-CM

## 2021-03-11 DIAGNOSIS — R19.7 DIARRHEA, UNSPECIFIED TYPE: ICD-10-CM

## 2021-03-11 DIAGNOSIS — D72.828 OTHER ELEVATED WHITE BLOOD CELL (WBC) COUNT: ICD-10-CM

## 2021-03-11 PROBLEM — E87.6 HYPOKALEMIA: Status: ACTIVE | Noted: 2021-03-11

## 2021-03-11 PROBLEM — E87.1 HYPONATREMIA: Status: ACTIVE | Noted: 2021-03-11

## 2021-03-11 PROBLEM — D72.825 BANDEMIA: Status: ACTIVE | Noted: 2021-03-11

## 2021-03-11 LAB
ALBUMIN SERPL BCP-MCNC: 4.4 G/DL (ref 3.2–4.9)
ALBUMIN/GLOB SERPL: 1.1 G/DL
ALP SERPL-CCNC: 57 U/L (ref 30–99)
ALT SERPL-CCNC: 55 U/L (ref 2–50)
ANION GAP SERPL CALC-SCNC: 13 MMOL/L (ref 7–16)
ANISOCYTOSIS BLD QL SMEAR: ABNORMAL
APPEARANCE UR: CLEAR
AST SERPL-CCNC: 77 U/L (ref 12–45)
BACTERIA #/AREA URNS HPF: NEGATIVE /HPF
BASOPHILS # BLD AUTO: 0 % (ref 0–1.8)
BASOPHILS # BLD: 0 K/UL (ref 0–0.12)
BILIRUB SERPL-MCNC: 4.9 MG/DL (ref 0.1–1.5)
BILIRUB UR QL STRIP.AUTO: NEGATIVE
BUN SERPL-MCNC: 6 MG/DL (ref 8–22)
CALCIUM SERPL-MCNC: 9.4 MG/DL (ref 8.5–10.5)
CHLORIDE SERPL-SCNC: 96 MMOL/L (ref 96–112)
CO2 SERPL-SCNC: 25 MMOL/L (ref 20–33)
COLOR UR: ABNORMAL
CREAT SERPL-MCNC: 0.34 MG/DL (ref 0.5–1.4)
EOSINOPHIL # BLD AUTO: 0.15 K/UL (ref 0–0.51)
EOSINOPHIL NFR BLD: 0.9 % (ref 0–6.9)
EPI CELLS #/AREA URNS HPF: ABNORMAL /HPF
ERYTHROCYTE [DISTWIDTH] IN BLOOD BY AUTOMATED COUNT: 67.7 FL (ref 35.9–50)
FLUAV RNA SPEC QL NAA+PROBE: NEGATIVE
FLUBV RNA SPEC QL NAA+PROBE: NEGATIVE
GLOBULIN SER CALC-MCNC: 3.9 G/DL (ref 1.9–3.5)
GLUCOSE SERPL-MCNC: 98 MG/DL (ref 65–99)
GLUCOSE UR STRIP.AUTO-MCNC: NEGATIVE MG/DL
HCG SERPL QL: NEGATIVE
HCT VFR BLD AUTO: 23.6 % (ref 37–47)
HGB BLD-MCNC: 8.2 G/DL (ref 12–16)
HGB RETIC QN AUTO: 30.4 PG/CELL (ref 29–35)
HYALINE CASTS #/AREA URNS LPF: ABNORMAL /LPF
IMM RETICS NFR: 19.4 % (ref 9.3–17.4)
KETONES UR STRIP.AUTO-MCNC: NEGATIVE MG/DL
LACTATE BLD-SCNC: 1.4 MMOL/L (ref 0.5–2)
LEUKOCYTE ESTERASE UR QL STRIP.AUTO: ABNORMAL
LIPASE SERPL-CCNC: 38 U/L (ref 11–82)
LYMPHOCYTES # BLD AUTO: 1.3 K/UL (ref 1–4.8)
LYMPHOCYTES NFR BLD: 7.9 % (ref 22–41)
MACROCYTES BLD QL SMEAR: ABNORMAL
MANUAL DIFF BLD: NORMAL
MCH RBC QN AUTO: 30.5 PG (ref 27–33)
MCHC RBC AUTO-ENTMCNC: 34.7 G/DL (ref 33.6–35)
MCV RBC AUTO: 87.7 FL (ref 81.4–97.8)
MICRO URNS: ABNORMAL
MONOCYTES # BLD AUTO: 0 K/UL (ref 0–0.85)
MONOCYTES NFR BLD AUTO: 0 % (ref 0–13.4)
MORPHOLOGY BLD-IMP: NORMAL
NEUTROPHILS # BLD AUTO: 15.05 K/UL (ref 2–7.15)
NEUTROPHILS NFR BLD: 85.1 % (ref 44–72)
NEUTS BAND NFR BLD MANUAL: 6.1 % (ref 0–10)
NITRITE UR QL STRIP.AUTO: NEGATIVE
NRBC # BLD AUTO: 0.24 K/UL
NRBC BLD-RTO: 1.5 /100 WBC
PH UR STRIP.AUTO: 5.5 [PH] (ref 5–8)
PLATELET # BLD AUTO: 501 K/UL (ref 164–446)
PLATELET BLD QL SMEAR: NORMAL
PMV BLD AUTO: 10.5 FL (ref 9–12.9)
POIKILOCYTOSIS BLD QL SMEAR: NORMAL
POLYCHROMASIA BLD QL SMEAR: NORMAL
POTASSIUM SERPL-SCNC: 3.2 MMOL/L (ref 3.6–5.5)
PROT SERPL-MCNC: 8.3 G/DL (ref 6–8.2)
PROT UR QL STRIP: NEGATIVE MG/DL
RBC # BLD AUTO: 2.69 M/UL (ref 4.2–5.4)
RBC # URNS HPF: ABNORMAL /HPF
RBC BLD AUTO: PRESENT
RBC UR QL AUTO: ABNORMAL
RETICS # AUTO: 0.39 M/UL (ref 0.04–0.06)
RETICS/RBC NFR: 17.4 % (ref 0.8–2.1)
RSV RNA SPEC QL NAA+PROBE: NEGATIVE
SARS-COV-2 RNA RESP QL NAA+PROBE: NOTDETECTED
SICKLE CELLS BLD QL SMEAR: NORMAL
SODIUM SERPL-SCNC: 134 MMOL/L (ref 135–145)
SP GR UR STRIP.AUTO: 1.01
SPECIMEN SOURCE: NORMAL
TARGETS BLD QL SMEAR: NORMAL
UROBILINOGEN UR STRIP.AUTO-MCNC: 2 MG/DL
WBC # BLD AUTO: 16.5 K/UL (ref 4.8–10.8)
WBC #/AREA URNS HPF: ABNORMAL /HPF

## 2021-03-11 PROCEDURE — 700111 HCHG RX REV CODE 636 W/ 250 OVERRIDE (IP): Performed by: EMERGENCY MEDICINE

## 2021-03-11 PROCEDURE — C9803 HOPD COVID-19 SPEC COLLECT: HCPCS | Performed by: EMERGENCY MEDICINE

## 2021-03-11 PROCEDURE — 71045 X-RAY EXAM CHEST 1 VIEW: CPT

## 2021-03-11 PROCEDURE — 99220 PR INITIAL OBSERVATION CARE,LEVL III: CPT | Performed by: HOSPITALIST

## 2021-03-11 PROCEDURE — 84703 CHORIONIC GONADOTROPIN ASSAY: CPT

## 2021-03-11 PROCEDURE — 80053 COMPREHEN METABOLIC PANEL: CPT

## 2021-03-11 PROCEDURE — 700111 HCHG RX REV CODE 636 W/ 250 OVERRIDE (IP): Performed by: HOSPITALIST

## 2021-03-11 PROCEDURE — 700102 HCHG RX REV CODE 250 W/ 637 OVERRIDE(OP): Performed by: HOSPITALIST

## 2021-03-11 PROCEDURE — 74176 CT ABD & PELVIS W/O CONTRAST: CPT

## 2021-03-11 PROCEDURE — 81001 URINALYSIS AUTO W/SCOPE: CPT

## 2021-03-11 PROCEDURE — 96365 THER/PROPH/DIAG IV INF INIT: CPT

## 2021-03-11 PROCEDURE — 96375 TX/PRO/DX INJ NEW DRUG ADDON: CPT

## 2021-03-11 PROCEDURE — 700105 HCHG RX REV CODE 258: Performed by: HOSPITALIST

## 2021-03-11 PROCEDURE — 0241U HCHG SARS-COV-2 COVID-19 NFCT DS RESP RNA 4 TRGT MIC: CPT

## 2021-03-11 PROCEDURE — 96376 TX/PRO/DX INJ SAME DRUG ADON: CPT

## 2021-03-11 PROCEDURE — G0378 HOSPITAL OBSERVATION PER HR: HCPCS

## 2021-03-11 PROCEDURE — 85007 BL SMEAR W/DIFF WBC COUNT: CPT

## 2021-03-11 PROCEDURE — 83690 ASSAY OF LIPASE: CPT

## 2021-03-11 PROCEDURE — 700105 HCHG RX REV CODE 258: Performed by: EMERGENCY MEDICINE

## 2021-03-11 PROCEDURE — 83605 ASSAY OF LACTIC ACID: CPT

## 2021-03-11 PROCEDURE — A9270 NON-COVERED ITEM OR SERVICE: HCPCS | Performed by: HOSPITALIST

## 2021-03-11 PROCEDURE — 99285 EMERGENCY DEPT VISIT HI MDM: CPT

## 2021-03-11 PROCEDURE — 85027 COMPLETE CBC AUTOMATED: CPT

## 2021-03-11 PROCEDURE — 87040 BLOOD CULTURE FOR BACTERIA: CPT

## 2021-03-11 PROCEDURE — 85046 RETICYTE/HGB CONCENTRATE: CPT

## 2021-03-11 RX ORDER — ACETAMINOPHEN 500 MG
500 TABLET ORAL
COMMUNITY
End: 2021-03-31

## 2021-03-11 RX ORDER — MONTELUKAST SODIUM 10 MG/1
10 TABLET ORAL DAILY
Status: DISCONTINUED | OUTPATIENT
Start: 2021-03-11 | End: 2021-03-11

## 2021-03-11 RX ORDER — ONDANSETRON 4 MG/1
4 TABLET, ORALLY DISINTEGRATING ORAL EVERY 4 HOURS PRN
Status: DISCONTINUED | OUTPATIENT
Start: 2021-03-11 | End: 2021-03-12 | Stop reason: HOSPADM

## 2021-03-11 RX ORDER — AMOXICILLIN 250 MG
2 CAPSULE ORAL 2 TIMES DAILY
Status: DISCONTINUED | OUTPATIENT
Start: 2021-03-11 | End: 2021-03-12 | Stop reason: HOSPADM

## 2021-03-11 RX ORDER — PROMETHAZINE HYDROCHLORIDE 25 MG/1
12.5-25 TABLET ORAL EVERY 4 HOURS PRN
Status: DISCONTINUED | OUTPATIENT
Start: 2021-03-11 | End: 2021-03-12 | Stop reason: HOSPADM

## 2021-03-11 RX ORDER — CITALOPRAM 40 MG/1
40 TABLET ORAL DAILY
Status: DISCONTINUED | OUTPATIENT
Start: 2021-03-12 | End: 2021-03-12 | Stop reason: HOSPADM

## 2021-03-11 RX ORDER — ONDANSETRON 2 MG/ML
4 INJECTION INTRAMUSCULAR; INTRAVENOUS ONCE
Status: COMPLETED | OUTPATIENT
Start: 2021-03-11 | End: 2021-03-11

## 2021-03-11 RX ORDER — ACETAMINOPHEN 325 MG/1
650 TABLET ORAL EVERY 6 HOURS PRN
Status: DISCONTINUED | OUTPATIENT
Start: 2021-03-11 | End: 2021-03-12 | Stop reason: HOSPADM

## 2021-03-11 RX ORDER — SODIUM CHLORIDE 9 MG/ML
1000 INJECTION, SOLUTION INTRAVENOUS ONCE
Status: COMPLETED | OUTPATIENT
Start: 2021-03-11 | End: 2021-03-12

## 2021-03-11 RX ORDER — SODIUM CHLORIDE 9 MG/ML
1000 INJECTION, SOLUTION INTRAVENOUS ONCE
Status: COMPLETED | OUTPATIENT
Start: 2021-03-11 | End: 2021-03-11

## 2021-03-11 RX ORDER — BISACODYL 10 MG
10 SUPPOSITORY, RECTAL RECTAL
Status: DISCONTINUED | OUTPATIENT
Start: 2021-03-11 | End: 2021-03-12 | Stop reason: HOSPADM

## 2021-03-11 RX ORDER — SODIUM CHLORIDE, SODIUM LACTATE, POTASSIUM CHLORIDE, CALCIUM CHLORIDE 600; 310; 30; 20 MG/100ML; MG/100ML; MG/100ML; MG/100ML
INJECTION, SOLUTION INTRAVENOUS CONTINUOUS
Status: DISCONTINUED | OUTPATIENT
Start: 2021-03-11 | End: 2021-03-12 | Stop reason: HOSPADM

## 2021-03-11 RX ORDER — POTASSIUM CHLORIDE 20 MEQ/1
20 TABLET, EXTENDED RELEASE ORAL ONCE
Status: COMPLETED | OUTPATIENT
Start: 2021-03-11 | End: 2021-03-11

## 2021-03-11 RX ORDER — PROCHLORPERAZINE EDISYLATE 5 MG/ML
5-10 INJECTION INTRAMUSCULAR; INTRAVENOUS EVERY 4 HOURS PRN
Status: DISCONTINUED | OUTPATIENT
Start: 2021-03-11 | End: 2021-03-12 | Stop reason: HOSPADM

## 2021-03-11 RX ORDER — ETONOGESTREL 68 MG/1
1 IMPLANT SUBCUTANEOUS ONCE
COMMUNITY

## 2021-03-11 RX ORDER — POLYETHYLENE GLYCOL 3350 17 G/17G
1 POWDER, FOR SOLUTION ORAL
Status: DISCONTINUED | OUTPATIENT
Start: 2021-03-11 | End: 2021-03-12 | Stop reason: HOSPADM

## 2021-03-11 RX ORDER — HYDROXYZINE HYDROCHLORIDE 25 MG/1
25 TABLET, FILM COATED ORAL EVERY 6 HOURS PRN
Status: DISCONTINUED | OUTPATIENT
Start: 2021-03-11 | End: 2021-03-11

## 2021-03-11 RX ORDER — PROMETHAZINE HYDROCHLORIDE 12.5 MG/1
12.5-25 SUPPOSITORY RECTAL EVERY 4 HOURS PRN
Status: DISCONTINUED | OUTPATIENT
Start: 2021-03-11 | End: 2021-03-12 | Stop reason: HOSPADM

## 2021-03-11 RX ORDER — ONDANSETRON 2 MG/ML
4 INJECTION INTRAMUSCULAR; INTRAVENOUS EVERY 4 HOURS PRN
Status: DISCONTINUED | OUTPATIENT
Start: 2021-03-11 | End: 2021-03-12 | Stop reason: HOSPADM

## 2021-03-11 RX ADMIN — SODIUM CHLORIDE 1000 ML: 9 INJECTION, SOLUTION INTRAVENOUS at 17:15

## 2021-03-11 RX ADMIN — ONDANSETRON 4 MG: 2 INJECTION INTRAMUSCULAR; INTRAVENOUS at 13:15

## 2021-03-11 RX ADMIN — SODIUM CHLORIDE, POTASSIUM CHLORIDE, SODIUM LACTATE AND CALCIUM CHLORIDE: 600; 310; 30; 20 INJECTION, SOLUTION INTRAVENOUS at 20:24

## 2021-03-11 RX ADMIN — ACETAMINOPHEN 650 MG: 325 TABLET, FILM COATED ORAL at 20:23

## 2021-03-11 RX ADMIN — POTASSIUM CHLORIDE 20 MEQ: 20 TABLET, EXTENDED RELEASE ORAL at 18:00

## 2021-03-11 RX ADMIN — ONDANSETRON 4 MG: 2 INJECTION INTRAMUSCULAR; INTRAVENOUS at 20:23

## 2021-03-11 RX ADMIN — CEFTRIAXONE SODIUM 2 G: 2 INJECTION, POWDER, FOR SOLUTION INTRAMUSCULAR; INTRAVENOUS at 14:45

## 2021-03-11 RX ADMIN — SODIUM CHLORIDE 1000 ML: 9 INJECTION, SOLUTION INTRAVENOUS at 13:15

## 2021-03-11 ASSESSMENT — ENCOUNTER SYMPTOMS
DIZZINESS: 0
BACK PAIN: 0
ABDOMINAL PAIN: 0
SHORTNESS OF BREATH: 0
LOSS OF CONSCIOUSNESS: 0
VOMITING: 1
FEVER: 0
BLURRED VISION: 0
PALPITATIONS: 0
NAUSEA: 1
SORE THROAT: 0
HEADACHES: 0
DIARRHEA: 1
COUGH: 0
DOUBLE VISION: 0
CHILLS: 0

## 2021-03-11 ASSESSMENT — FIBROSIS 4 INDEX
FIB4 SCORE: 0.52
FIB4 SCORE: 0.75

## 2021-03-11 ASSESSMENT — PATIENT HEALTH QUESTIONNAIRE - PHQ9
1. LITTLE INTEREST OR PLEASURE IN DOING THINGS: NOT AT ALL
2. FEELING DOWN, DEPRESSED, IRRITABLE, OR HOPELESS: NOT AT ALL
SUM OF ALL RESPONSES TO PHQ9 QUESTIONS 1 AND 2: 0

## 2021-03-11 NOTE — ED PROVIDER NOTES
ED Provider Note    CHIEF COMPLAINT  No chief complaint on file.  Nausea, vomiting, diarrhea.    HPI  Alicia Tejada is a 25 y.o. female who has a history of sickle cell anemia who presents to the emergency department complaining of nausea vomiting diarrhea.  Patient states that it started 4 days ago and showed some nausea and vomiting.  She then progressed to having diarrhea as well.  The nausea persist the vomiting stopped yesterday.  She had a little bit of diarrhea 2 days ago less yesterday and again diarrhea today.  This is nonbloody nonbilious emesis and nonbloody nonmucousy diarrhea.  She does not have associated pain.  2 days ago she had fever up to 30 9C.  She had a COVID-19 test yesterday which was negative.  She denies any dysuria hematuria or increased frequency.  She denies the chance of pregnancy.  She denies any abdominal pain.  No cough chest pain or shortness of breath.    REVIEW OF SYSTEMS  See HPI for further details. All other systems are negative.    PAST MEDICAL HISTORY  Past Medical History:   Diagnosis Date   • Anemia    • Anxiety    • Asthma     Inhaler use PRN.   • Sickle cell anemia (HCC)    • Snoring     Negative sleep study.       FAMILY HISTORY  Family History   Problem Relation Age of Onset   • No Known Problems Mother    • No Known Problems Father    • No Known Problems Brother    • Diabetes Maternal Grandmother         Type II   • Hypertension Maternal Grandmother    • Diabetes Paternal Grandmother         Type II   • Hypertension Paternal Grandmother    • No Known Problems Brother    • GI Disease Brother         GI Ulcers   • Other Maternal Uncle         Sickle Cell        SOCIAL HISTORY  Social History     Socioeconomic History   • Marital status: Single     Spouse name: Not on file   • Number of children: Not on file   • Years of education: Not on file   • Highest education level: Not on file   Occupational History   • Not on file   Tobacco Use   • Smoking status: Never  Smoker   • Smokeless tobacco: Never Used   Substance and Sexual Activity   • Alcohol use: Yes     Comment: socially.    • Drug use: Yes     Types: Marijuana   • Sexual activity: Yes     Partners: Male     Birth control/protection: Condom, Coitus Interruptus   Other Topics Concern   • Not on file   Social History Narrative   • Not on file     Social Determinants of Health     Financial Resource Strain:    • Difficulty of Paying Living Expenses:    Food Insecurity:    • Worried About Running Out of Food in the Last Year:    • Ran Out of Food in the Last Year:    Transportation Needs:    • Lack of Transportation (Medical):    • Lack of Transportation (Non-Medical):    Physical Activity:    • Days of Exercise per Week:    • Minutes of Exercise per Session:    Stress:    • Feeling of Stress :    Social Connections:    • Frequency of Communication with Friends and Family:    • Frequency of Social Gatherings with Friends and Family:    • Attends Gnosticism Services:    • Active Member of Clubs or Organizations:    • Attends Club or Organization Meetings:    • Marital Status:    Intimate Partner Violence:    • Fear of Current or Ex-Partner:    • Emotionally Abused:    • Physically Abused:    • Sexually Abused:        SURGICAL HISTORY  Past Surgical History:   Procedure Laterality Date   • ANUP BY LAPAROSCOPY  1/15/2019    Procedure: laparoscopic cholecystectomy;  Surgeon: Noel Greene M.D.;  Location: SURGERY [facility];  Service: General   • TONSILLECTOMY         CURRENT MEDICATIONS  Home Medications     Reviewed by Dagmar Alvarez (Pharmacy Tech) on 03/11/21 at 1659  Med List Status: Complete   Medication Last Dose Status   acetaminophen (TYLENOL) 500 MG Tab >2 days ago Active   citalopram (CELEXA) 40 MG Tab >4 days ago Active   etonogestrel (NEXPLANON) 68 MG Implant implant continuous Active   hydrOXYzine HCl (ATARAX) 25 MG Tab Not Taking Active   montelukast (SINGULAIR) 10 MG Tab Not Taking Active                 ALLERGIES  No Known Allergies    PHYSICAL EXAM  VITAL SIGNS: There were no vitals taken for this visit.   Vital signs on the nurses notes and have been reviewed  Constitutional: Awake alert no acute cardiopulmonary distress  HENT: Normocephalic, Atraumatic, Bilateral external ears normal,  Eyes: PERRL, EOMI, Conjunctiva normal, icteric sclera no discharge.   Neck: Normal range of motion  Cardiovascular: Normal heart rate, Normal rhythm, No murmurs, No rubs, No gallops.   Thorax & Lungs: Normal breath sounds, No respiratory distress  Abdomen: Bowel sounds normal, Soft, No tenderness  Skin: Warm, Dry, No erythema, No rash.   Back: No tenderness, No CVA tenderness.   Musculoskeletal: Good range of motion in all major joints.  Neurologic: Alert, No focal deficits noted.   Psychiatric: Affect anxious    Results for orders placed or performed during the hospital encounter of 03/11/21   CBC WITH DIFFERENTIAL   Result Value Ref Range    WBC 16.5 (H) 4.8 - 10.8 K/uL    RBC 2.69 (L) 4.20 - 5.40 M/uL    Hemoglobin 8.2 (L) 12.0 - 16.0 g/dL    Hematocrit 23.6 (L) 37.0 - 47.0 %    MCV 87.7 81.4 - 97.8 fL    MCH 30.5 27.0 - 33.0 pg    MCHC 34.7 33.6 - 35.0 g/dL    RDW 67.7 (H) 35.9 - 50.0 fL    Platelet Count 501 (H) 164 - 446 K/uL    MPV 10.5 9.0 - 12.9 fL    Neutrophils-Polys 85.10 (H) 44.00 - 72.00 %    Lymphocytes 7.90 (L) 22.00 - 41.00 %    Monocytes 0.00 0.00 - 13.40 %    Eosinophils 0.90 0.00 - 6.90 %    Basophils 0.00 0.00 - 1.80 %    Nucleated RBC 1.50 /100 WBC    Neutrophils (Absolute) 15.05 (H) 2.00 - 7.15 K/uL    Lymphs (Absolute) 1.30 1.00 - 4.80 K/uL    Monos (Absolute) 0.00 0.00 - 0.85 K/uL    Eos (Absolute) 0.15 0.00 - 0.51 K/uL    Baso (Absolute) 0.00 0.00 - 0.12 K/uL    NRBC (Absolute) 0.24 K/uL    Anisocytosis 2+ (A)     Macrocytosis 1+    COMP METABOLIC PANEL   Result Value Ref Range    Sodium 134 (L) 135 - 145 mmol/L    Potassium 3.2 (L) 3.6 - 5.5 mmol/L    Chloride 96 96 - 112 mmol/L    Co2 25 20 -  33 mmol/L    Anion Gap 13.0 7.0 - 16.0    Glucose 98 65 - 99 mg/dL    Bun 6 (L) 8 - 22 mg/dL    Creatinine 0.34 (L) 0.50 - 1.40 mg/dL    Calcium 9.4 8.5 - 10.5 mg/dL    AST(SGOT) 77 (H) 12 - 45 U/L    ALT(SGPT) 55 (H) 2 - 50 U/L    Alkaline Phosphatase 57 30 - 99 U/L    Total Bilirubin 4.9 (H) 0.1 - 1.5 mg/dL    Albumin 4.4 3.2 - 4.9 g/dL    Total Protein 8.3 (H) 6.0 - 8.2 g/dL    Globulin 3.9 (H) 1.9 - 3.5 g/dL    A-G Ratio 1.1 g/dL   LIPASE   Result Value Ref Range    Lipase 38 11 - 82 U/L   URINALYSIS CULTURE, IF INDICATED    Specimen: Urine   Result Value Ref Range    Color DK Yellow     Character Clear     Specific Gravity 1.010 <1.035    Ph 5.5 5.0 - 8.0    Glucose Negative Negative mg/dL    Ketones Negative Negative mg/dL    Protein Negative Negative mg/dL    Bilirubin Negative Negative    Urobilinogen, Urine 2.0 Negative    Nitrite Negative Negative    Leukocyte Esterase Trace (A) Negative    Occult Blood Small (A) Negative    Micro Urine Req Microscopic    HCG QUAL SERUM   Result Value Ref Range    Beta-Hcg Qualitative Serum Negative Negative   RETICULOCYTES COUNT   Result Value Ref Range    Reticulocyte Count 17.4 (H) 0.8 - 2.1 %    Retic, Absolute 0.39 (H) 0.04 - 0.06 M/uL    Imm. Reticulocyte Fraction 19.4 (H) 9.3 - 17.4 %    Retic Hgb Equivalent 30.4 29.0 - 35.0 pg/cell   ESTIMATED GFR   Result Value Ref Range    GFR If African American >60 >60 mL/min/1.73 m 2    GFR If Non African American >60 >60 mL/min/1.73 m 2   DIFFERENTIAL MANUAL   Result Value Ref Range    Bands-Stabs 6.10 0.00 - 10.00 %    Manual Diff Status PERFORMED    PERIPHERAL SMEAR REVIEW   Result Value Ref Range    Peripheral Smear Review see below    PLATELET ESTIMATE   Result Value Ref Range    Plt Estimation Increased    MORPHOLOGY   Result Value Ref Range    RBC Morphology Present     Polychromia 1+     Poikilocytosis 3+     Target Cells 1+     Sickle Cells 3+    LACTIC ACID   Result Value Ref Range    Lactic Acid 1.4 0.5 - 2.0 mmol/L    URINE MICROSCOPIC (W/UA)   Result Value Ref Range    WBC 5-10 (A) /hpf    RBC 2-5 (A) /hpf    Bacteria Negative None /hpf    Epithelial Cells Few /hpf    Hyaline Cast 3-5 (A) /lpf   COV-2, FLU A/B, AND RSV BY PCR (2-4 HOURS CEPHEID): Collect NP swab in VTM    Specimen: Respirate   Result Value Ref Range    Influenza virus A RNA Negative Negative    Influenza virus B, PCR Negative Negative    RSV, PCR Negative Negative    SARS-CoV-2 by PCR NotDetected     SARS-CoV-2 Source NP Swab         RADIOLOGY/PROCEDURES  CT-ABDOMEN-PELVIS W/O   Final Result      1.  No acute intra-abdominal findings      2.  The spleen is not visualized and may have been removed.      DX-CHEST-PORTABLE (1 VIEW)   Final Result      No acute cardiopulmonary process is seen.          COURSE & MEDICAL DECISION MAKING  Pertinent Labs & Imaging studies reviewed. (See chart for details)    Emergency department complaining of a nausea, vomiting, and fever at home.    The patient has sickle cell disease.  Brothers with diagnosis considered including COVID-19 infection, dehydration gastroenteritis, acute intra-abdominal infection, bacterial infection UTI pneumonia acute chest syndrome.    She does not have a fever here but she had a fever at home documented of 39 °C.  She is cultured and labs and lactic acid are obtained.    Patient was tachycardic has a white count of 15,000.  She technically sepsis criteria with she is not febrile here.  She is cultured because she is immunocompromise and has a bandemia abdomen empiric antibiotics.  The patient has an elevated bilirubin which is likely associated with hemolysis from sickle cell disease but she also has a mild transaminitis.  I have reviewed her chart these labs were acutely higher than her baseline.  She had a prior cholecystectomy.  She does not really have significant right upper quadrant tenderness I doubt she has a retained biliary stone.  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  The patient does not have a visible spleen.    The patient looks fairly well but I think she should be hospitalized because of her immunocompromise.  And.  Culture should be checked unless she repeated the morning.  Discussed case with the hospitalist the patient be hospitalized for further work-up and treatment to rule out sepsis.        FINAL IMPRESSION  1. Hb-SS disease with crisis (HCC)     2. Other elevated white blood cell (WBC) count     3. Nausea and vomiting, intractability of vomiting not specified, unspecified vomiting type     4. Diarrhea, unspecified type         2.   3.         Electronically signed by: Gordy Ramirez M.D., 3/11/2021 12:50 PM

## 2021-03-11 NOTE — ED NOTES
Pt ambulate to yellow 54/ C/O N/V/D since Monday states possible sick exposure with 1 year old who had GI s/s last week. Pt states she has sicke cell so came for hydration to prevent a crisis. Pt denies pain, dizziness or feeling faint. IV placed, labs (+) #1 blood culture sent to lab. Pt A&O x4 GCS= 15. Will cont to monitor pt.

## 2021-03-11 NOTE — ED TRIAGE NOTES
Alicia Tejada  Chief Complaint   Patient presents with   • N/V     Pt presents with a complaint of N/V X4 days. Pt states she feels dehydrated and is concerned because she has sickle cell disease. Pt denies pain at this time.      Pt ambulatory to triage with above complaint.     /58   Pulse (!) 105   Temp 37.2 °C (98.9 °F) (Temporal)   Resp 16   Wt 53.1 kg (117 lb)   SpO2 93%   BMI 19.47 kg/m²     Pt informed of triage process and encouraged to notify staff of any changes or concerns. Pt verbalized understanding of instructions. Apologized for long wait time. Pt placed back in lobby.

## 2021-03-12 VITALS
TEMPERATURE: 99.2 F | HEART RATE: 86 BPM | WEIGHT: 121.47 LBS | DIASTOLIC BLOOD PRESSURE: 55 MMHG | HEIGHT: 65 IN | OXYGEN SATURATION: 95 % | RESPIRATION RATE: 18 BRPM | BODY MASS INDEX: 20.24 KG/M2 | SYSTOLIC BLOOD PRESSURE: 101 MMHG

## 2021-03-12 PROBLEM — R19.7 NAUSEA, VOMITING, AND DIARRHEA: Status: ACTIVE | Noted: 2021-03-12

## 2021-03-12 PROBLEM — E87.6 HYPOKALEMIA: Status: RESOLVED | Noted: 2021-03-11 | Resolved: 2021-03-12

## 2021-03-12 PROBLEM — R19.7 NAUSEA, VOMITING, AND DIARRHEA: Status: RESOLVED | Noted: 2021-03-12 | Resolved: 2021-03-12

## 2021-03-12 PROBLEM — R11.2 NAUSEA, VOMITING, AND DIARRHEA: Status: ACTIVE | Noted: 2021-03-12

## 2021-03-12 PROBLEM — R11.2 NAUSEA, VOMITING, AND DIARRHEA: Status: RESOLVED | Noted: 2021-03-12 | Resolved: 2021-03-12

## 2021-03-12 PROBLEM — E87.1 HYPONATREMIA: Status: RESOLVED | Noted: 2021-03-11 | Resolved: 2021-03-12

## 2021-03-12 PROBLEM — D72.825 BANDEMIA: Status: RESOLVED | Noted: 2021-03-11 | Resolved: 2021-03-12

## 2021-03-12 PROBLEM — Q89.01 ASPLENIA: Chronic | Status: ACTIVE | Noted: 2021-03-12

## 2021-03-12 PROBLEM — D64.9 ANEMIA: Chronic | Status: ACTIVE | Noted: 2021-03-12

## 2021-03-12 LAB
ABO GROUP BLD: NORMAL
ALBUMIN SERPL BCP-MCNC: 3.2 G/DL (ref 3.2–4.9)
ALBUMIN/GLOB SERPL: 1.1 G/DL
ALP SERPL-CCNC: 40 U/L (ref 30–99)
ALT SERPL-CCNC: 26 U/L (ref 2–50)
ANION GAP SERPL CALC-SCNC: 9 MMOL/L (ref 7–16)
ANISOCYTOSIS BLD QL SMEAR: ABNORMAL
AST SERPL-CCNC: 37 U/L (ref 12–45)
BARCODED ABORH UBTYP: 9500
BARCODED ABORH UBTYP: 9500
BARCODED PRD CODE UBPRD: NORMAL
BARCODED PRD CODE UBPRD: NORMAL
BARCODED UNIT NUM UBUNT: NORMAL
BARCODED UNIT NUM UBUNT: NORMAL
BASOPHILS # BLD AUTO: 0 % (ref 0–1.8)
BASOPHILS # BLD AUTO: 0 % (ref 0–1.8)
BASOPHILS # BLD: 0 K/UL (ref 0–0.12)
BASOPHILS # BLD: 0 K/UL (ref 0–0.12)
BILIRUB SERPL-MCNC: 1.7 MG/DL (ref 0.1–1.5)
BLD GP AB SCN SERPL QL: NORMAL
BUN SERPL-MCNC: 4 MG/DL (ref 8–22)
CALCIUM SERPL-MCNC: 8.2 MG/DL (ref 8.5–10.5)
CHLORIDE SERPL-SCNC: 102 MMOL/L (ref 96–112)
CO2 SERPL-SCNC: 25 MMOL/L (ref 20–33)
COMPONENT R 8504R: NORMAL
COMPONENT R 8504R: NORMAL
CREAT SERPL-MCNC: 0.29 MG/DL (ref 0.5–1.4)
EOSINOPHIL # BLD AUTO: 0.33 K/UL (ref 0–0.51)
EOSINOPHIL # BLD AUTO: 0.47 K/UL (ref 0–0.51)
EOSINOPHIL NFR BLD: 3.4 % (ref 0–6.9)
EOSINOPHIL NFR BLD: 5.3 % (ref 0–6.9)
ERYTHROCYTE [DISTWIDTH] IN BLOOD BY AUTOMATED COUNT: 66 FL (ref 35.9–50)
ERYTHROCYTE [DISTWIDTH] IN BLOOD BY AUTOMATED COUNT: 68.2 FL (ref 35.9–50)
GLOBULIN SER CALC-MCNC: 2.8 G/DL (ref 1.9–3.5)
GLUCOSE SERPL-MCNC: 101 MG/DL (ref 65–99)
HCT VFR BLD AUTO: 16.5 % (ref 37–47)
HCT VFR BLD AUTO: 17.1 % (ref 37–47)
HGB BLD-MCNC: 5.6 G/DL (ref 12–16)
HGB BLD-MCNC: 6.1 G/DL (ref 12–16)
HGB BLD-MCNC: 7.7 G/DL (ref 12–16)
HYPOCHROMIA BLD QL SMEAR: ABNORMAL
LYMPHOCYTES # BLD AUTO: 2.26 K/UL (ref 1–4.8)
LYMPHOCYTES # BLD AUTO: 3.09 K/UL (ref 1–4.8)
LYMPHOCYTES NFR BLD: 25.7 % (ref 22–41)
LYMPHOCYTES NFR BLD: 32.2 % (ref 22–41)
MACROCYTES BLD QL SMEAR: ABNORMAL
MAGNESIUM SERPL-MCNC: 2 MG/DL (ref 1.5–2.5)
MANUAL DIFF BLD: NORMAL
MANUAL DIFF BLD: NORMAL
MCH RBC QN AUTO: 29.5 PG (ref 27–33)
MCH RBC QN AUTO: 30.8 PG (ref 27–33)
MCHC RBC AUTO-ENTMCNC: 33.9 G/DL (ref 33.6–35)
MCHC RBC AUTO-ENTMCNC: 35.7 G/DL (ref 33.6–35)
MCV RBC AUTO: 86.4 FL (ref 81.4–97.8)
MCV RBC AUTO: 86.8 FL (ref 81.4–97.8)
MONOCYTES # BLD AUTO: 0.16 K/UL (ref 0–0.85)
MONOCYTES # BLD AUTO: 0.24 K/UL (ref 0–0.85)
MONOCYTES NFR BLD AUTO: 1.8 % (ref 0–13.4)
MONOCYTES NFR BLD AUTO: 2.5 % (ref 0–13.4)
MORPHOLOGY BLD-IMP: NORMAL
MORPHOLOGY BLD-IMP: NORMAL
MYELOCYTES NFR BLD MANUAL: 1.8 %
NEUTROPHILS # BLD AUTO: 5.76 K/UL (ref 2–7.15)
NEUTROPHILS # BLD AUTO: 5.94 K/UL (ref 2–7.15)
NEUTROPHILS NFR BLD: 61.9 % (ref 44–72)
NEUTROPHILS NFR BLD: 64.6 % (ref 44–72)
NEUTS BAND NFR BLD MANUAL: 0.9 % (ref 0–10)
NRBC # BLD AUTO: 0.12 K/UL
NRBC # BLD AUTO: 0.13 K/UL
NRBC BLD-RTO: 1.2 /100 WBC
NRBC BLD-RTO: 1.5 /100 WBC
OVALOCYTES BLD QL SMEAR: NORMAL
PLATELET # BLD AUTO: 372 K/UL (ref 164–446)
PLATELET # BLD AUTO: 390 K/UL (ref 164–446)
PLATELET BLD QL SMEAR: NORMAL
PLATELET BLD QL SMEAR: NORMAL
PMV BLD AUTO: 10.1 FL (ref 9–12.9)
PMV BLD AUTO: 10.8 FL (ref 9–12.9)
POIKILOCYTOSIS BLD QL SMEAR: NORMAL
POLYCHROMASIA BLD QL SMEAR: NORMAL
POTASSIUM SERPL-SCNC: 2.9 MMOL/L (ref 3.6–5.5)
POTASSIUM SERPL-SCNC: 3.7 MMOL/L (ref 3.6–5.5)
PRODUCT TYPE UPROD: NORMAL
PRODUCT TYPE UPROD: NORMAL
PROT SERPL-MCNC: 6 G/DL (ref 6–8.2)
RBC # BLD AUTO: 1.9 M/UL (ref 4.2–5.4)
RBC # BLD AUTO: 1.98 M/UL (ref 4.2–5.4)
RBC BLD AUTO: PRESENT
RH BLD: NORMAL
SODIUM SERPL-SCNC: 136 MMOL/L (ref 135–145)
UNIT STATUS USTAT: NORMAL
UNIT STATUS USTAT: NORMAL
WBC # BLD AUTO: 8.8 K/UL (ref 4.8–10.8)
WBC # BLD AUTO: 9.6 K/UL (ref 4.8–10.8)

## 2021-03-12 PROCEDURE — 85007 BL SMEAR W/DIFF WBC COUNT: CPT

## 2021-03-12 PROCEDURE — 84132 ASSAY OF SERUM POTASSIUM: CPT

## 2021-03-12 PROCEDURE — 96375 TX/PRO/DX INJ NEW DRUG ADDON: CPT

## 2021-03-12 PROCEDURE — 86901 BLOOD TYPING SEROLOGIC RH(D): CPT

## 2021-03-12 PROCEDURE — 700102 HCHG RX REV CODE 250 W/ 637 OVERRIDE(OP): Performed by: NURSE PRACTITIONER

## 2021-03-12 PROCEDURE — 36430 TRANSFUSION BLD/BLD COMPNT: CPT

## 2021-03-12 PROCEDURE — 85027 COMPLETE CBC AUTOMATED: CPT

## 2021-03-12 PROCEDURE — 80053 COMPREHEN METABOLIC PANEL: CPT

## 2021-03-12 PROCEDURE — A9270 NON-COVERED ITEM OR SERVICE: HCPCS | Performed by: NURSE PRACTITIONER

## 2021-03-12 PROCEDURE — 83735 ASSAY OF MAGNESIUM: CPT

## 2021-03-12 PROCEDURE — 86923 COMPATIBILITY TEST ELECTRIC: CPT

## 2021-03-12 PROCEDURE — 86902 BLOOD TYPE ANTIGEN DONOR EA: CPT | Mod: 91

## 2021-03-12 PROCEDURE — 99217 PR OBSERVATION CARE DISCHARGE: CPT | Performed by: INTERNAL MEDICINE

## 2021-03-12 PROCEDURE — 86900 BLOOD TYPING SEROLOGIC ABO: CPT

## 2021-03-12 PROCEDURE — 85018 HEMOGLOBIN: CPT

## 2021-03-12 PROCEDURE — P9016 RBC LEUKOCYTES REDUCED: HCPCS

## 2021-03-12 PROCEDURE — G0378 HOSPITAL OBSERVATION PER HR: HCPCS

## 2021-03-12 PROCEDURE — 86850 RBC ANTIBODY SCREEN: CPT

## 2021-03-12 PROCEDURE — 700111 HCHG RX REV CODE 636 W/ 250 OVERRIDE (IP): Performed by: NURSE PRACTITIONER

## 2021-03-12 RX ORDER — POTASSIUM CHLORIDE 20 MEQ/1
60 TABLET, EXTENDED RELEASE ORAL DAILY
Status: DISCONTINUED | OUTPATIENT
Start: 2021-03-12 | End: 2021-03-12 | Stop reason: HOSPADM

## 2021-03-12 RX ORDER — POTASSIUM CHLORIDE 7.45 MG/ML
10 INJECTION INTRAVENOUS
Status: DISCONTINUED | OUTPATIENT
Start: 2021-03-12 | End: 2021-03-12 | Stop reason: ALTCHOICE

## 2021-03-12 RX ORDER — ALBUTEROL SULFATE 90 UG/1
2 AEROSOL, METERED RESPIRATORY (INHALATION)
Status: DISCONTINUED | OUTPATIENT
Start: 2021-03-12 | End: 2021-03-12 | Stop reason: HOSPADM

## 2021-03-12 RX ADMIN — POTASSIUM CHLORIDE 10 MEQ: 7.46 INJECTION, SOLUTION INTRAVENOUS at 09:49

## 2021-03-12 RX ADMIN — POTASSIUM CHLORIDE 60 MEQ: 1500 TABLET, EXTENDED RELEASE ORAL at 10:48

## 2021-03-12 ASSESSMENT — COPD QUESTIONNAIRES
COPD SCREENING SCORE: 0
DO YOU EVER COUGH UP ANY MUCUS OR PHLEGM?: NO/ONLY WITH OCCASIONAL COLDS OR INFECTIONS
HAVE YOU SMOKED AT LEAST 100 CIGARETTES IN YOUR ENTIRE LIFE: NO/DON'T KNOW
DURING THE PAST 4 WEEKS HOW MUCH DID YOU FEEL SHORT OF BREATH: NONE/LITTLE OF THE TIME

## 2021-03-12 NOTE — CARE PLAN
Problem: Fluid Volume:  Goal: Will maintain balanced intake and output  Outcome: PROGRESSING AS EXPECTED     Problem: Safety  Goal: Will remain free from injury  Outcome: PROGRESSING AS EXPECTED     Problem: Safety  Goal: Will remain free from falls  Outcome: PROGRESSING AS EXPECTED     Problem: Communication  Goal: The ability to communicate needs accurately and effectively will improve  Outcome: PROGRESSING AS EXPECTED

## 2021-03-12 NOTE — DISCHARGE PLANNING
Care Transition Team Assessment    Spoke with patient at bedside and verified all information. Lives alone in single story apartment. PCP Marlee Shearer. Uses no DME. Uses [employer] [address] Will drive self home @ D/C. Anticipate no needs @ present time.     Information Source  Orientation : Oriented x 4  Information Given By: Patient    Readmission Evaluation  Is this a readmission?: No    Interdisciplinary Discharge Planning  Primary Care Physician: Marlee Shearer  Lives with - Patient's Self Care Capacity: Alone and Able to Care For Self  Patient or legal guardian wants to designate a caregiver: No  Support Systems: Parent  Housing / Facility: 1 Story Apartment / Condo  Do You Take your Prescribed Medications Regularly: Yes  Able to Return to Previous ADL's: Yes  Mobility Issues: No  Prior Services: Home-Independent  Patient Prefers to be Discharged to:: Home  Assistance Needed: No  Durable Medical Equipment: Not Applicable    Discharge Preparedness  What are your discharge supports?: Parent  Prior Functional Level: Ambulatory    Functional Assesment  Prior Functional Level: Ambulatory    Finances  Prescription Coverage: Yes    Anticipated Discharge Information  Discharge Address: [facility] [address]  Discharge Contact Phone Number: [phone]

## 2021-03-12 NOTE — HOSPITAL COURSE
Ms. Tejada is a 25-year-old female with a past medical history of sickle cell disease who presented to the emergency department on 3/11/2021 with 3 days of nausea, vomiting, and diarrhea.  She was recently in the company of a young child who had similar symptoms and thinks that she may have gotten sick from her.  It was not accompanied by any abdominal pain, dysuria, frequency, blood in the stool, or hematemesis.  In the ED she did have a leukocytosis with a WBC of 16.5, and was noted to be anemic.  She does have chronic anemia and usually has hemoglobins in the 7-8 range.  He was also noted to be hypokalemic with a potassium of 3.2 which was repleted and rechecked the following day which was down to 2.9.  That was again repleted and when rechecked was up to 3.7.  Renal function was noted to be normal though her LFTs were slightly elevated with a total bilirubin of 4, which again is baseline for this patient.  CT of the abdomen and pelvis was done and was negative.  She is functionally asplenic which was seen on the CT scan.  She was started on fluid resuscitation and antiemetics which have resolved her symptoms.  She did require 1 unit of PRBC infusion for a hemoglobin of 6.1 and tolerated that well.  Her repeat hemoglobin prior to discharge was back to her baseline.  On the day of discharge her vital signs and lab work are now stable, her initial symptoms have resolved, and she is medically clear for discharge today though we have recommended close follow-up with her PCP for continued lab monitoring.

## 2021-03-12 NOTE — ASSESSMENT & PLAN NOTE
This is likely secondary to gastroenteritis.  Clinically patient has a quite a benign exam.  She has no abdominal pain, with clear lung fields benign abdominal exam.  Lab other than leukocytosis is benign  CT scan did not show any acute inflammatory or surgical process  She is however asplenic and did have a fever.  She has been given 1 dose of Rocephin here in the ED.  We will admit, monitor overnight and repeat labs in the a.m.  Continue fluid resuscitation  Hold on further antibiotics  Reevaluate labs, exam, and cultures in the a.m.

## 2021-03-12 NOTE — DISCHARGE SUMMARY
Discharge Summary    CHIEF COMPLAINT ON ADMISSION  Chief Complaint   Patient presents with   • N/V     Pt presents with a complaint of N/V X4 days. Pt states she feels dehydrated and is concerned because she has sickle cell disease. Pt denies pain at this time.      Reason for Admission  N/V/D    Admission Date  3/11/2021    CODE STATUS  Full Code    HPI & HOSPITAL COURSE  Ms. Tejada is a 25-year-old female with a past medical history of sickle cell disease who presented to the emergency department on 3/11/2021 with 3 days of nausea, vomiting, and diarrhea.  She was recently in the company of a young child who had similar symptoms and thinks that she may have gotten sick from her.  It was not accompanied by any abdominal pain, dysuria, frequency, blood in the stool, or hematemesis.  In the ED she did have a leukocytosis with a WBC of 16.5, and was noted to be anemic.  She does have chronic anemia and usually has hemoglobins in the 7-8 range.  He was also noted to be hypokalemic with a potassium of 3.2 which was repleted and rechecked the following day which was down to 2.9.  That was again repleted and when rechecked was up to 3.7.  Renal function was noted to be normal though her LFTs were slightly elevated with a total bilirubin of 4, which again is baseline for this patient.  CT of the abdomen and pelvis was done and was negative.  She is functionally asplenic which was seen on the CT scan.  She was started on fluid resuscitation and antiemetics which have resolved her symptoms.  She did require 1 unit of PRBC infusion for a hemoglobin of 6.1 and tolerated that well.  Her repeat hemoglobin prior to discharge was back to her baseline.  On the day of discharge her vital signs and lab work are now stable, her initial symptoms have resolved, and she is medically clear for discharge today though we have recommended close follow-up with her PCP for continued lab monitoring.    Therefore, she is discharged in good and  stable condition to home with close outpatient follow-up.    Discharge Date  3/12/2021    FOLLOW UP ITEMS POST DISCHARGE  PCP in 5-7 days.    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  Dose: 500 mg     citalopram 40 MG Tabs  Commonly known as: CeleXA   Take 1 Tab by mouth every day.  Dose: 40 mg     hydrOXYzine HCl 25 MG Tabs  Commonly known as: ATARAX   Take 1 tab by mouth once nightly as needed.     montelukast 10 MG Tabs  Commonly known as: SINGULAIR   Take 1 Tab by mouth every day.  Dose: 10 mg     Nexplanon 68 MG Impl implant  Generic drug: etonogestrel   Inject 1 Each under the skin one time.  Dose: 1 Each          Allergies  No Known Allergies    DIET  Orders Placed This Encounter   Procedures   • Diet Order Diet: Regular     Standing Status:   Standing     Number of Occurrences:   1     Order Specific Question:   Diet:     Answer:   Regular [1]     ACTIVITY  As tolerated.  Exercise encouraged.  Weight bearing as tolerated    CONSULTATIONS  None    PROCEDURES  None    LABORATORY  Lab Results   Component Value Date    SODIUM 136 03/12/2021    POTASSIUM 3.7 03/12/2021    CHLORIDE 102 03/12/2021    CO2 25 03/12/2021    GLUCOSE 101 (H) 03/12/2021    BUN 4 (L) 03/12/2021    CREATININE 0.29 (L) 03/12/2021      Lab Results   Component Value Date    WBC 8.8 03/12/2021    HEMOGLOBIN 6.1 (L)  03/12/2021    HEMATOCRIT 17.1 (LL) 03/12/2021    PLATELETCT 390 03/12/2021      Total time of the discharge process exceeds 32 minutes.

## 2021-03-12 NOTE — ASSESSMENT & PLAN NOTE
Mild at 3.2  Give 20 mEq p.o. x1  Repeat lab in a.m.  Etiology likely vomiting diarrhea and poor intake

## 2021-03-12 NOTE — H&P
Hospital Medicine History & Physical Note    Date of Service  3/11/2021    Primary Care Physician  Marlee Shearer P.A.-C.    Consultants      Code Status  Full Code    Chief Complaint  Chief Complaint   Patient presents with   • N/V     Pt presents with a complaint of N/V X4 days. Pt states she feels dehydrated and is concerned because she has sickle cell disease. Pt denies pain at this time.        History of Presenting Illness  25 y.o. female who presented 3/11/2021 with a previous medical history of sickle cell disease.  She is followed by Dr. Mkie Bay of hematology and oncology here in [facility].  She is currently on no medications.  She has had only one crisis and that was in 2014.    Patient presents today with 3 days of nausea vomiting and diarrhea.  She was with a young child at the end of last week who had similar symptoms.  She thinks there may have been somebody else in the family who is also ill.  She reports was initially nausea and then vomiting, and diarrhea a day or 2 later.  At home she had a fever 24 hours ago, but has not had a fever in the last 24 hours.  She has had some mild nausea, however the vomiting has also gone away in the last 24 hours.  She continues to have some diarrhea.  She denies any abdominal pain, no cough or shortness of breath, no dysuria or frequency, blood or melena, bloody emesis or coffee-ground emesis, new rashes, unusual headaches.    In the ED she has not had imaging and lab work-up.  Labs demonstrate a white count of 16.5 hemoglobin of 8, platelet count of 501.  Patient does have chronic anemia in the range of 7-8 on her baseline.  Her CHEM panel shows mild hyponatremia at 134, and mild hypokalemia at 3.2.  Renal function is normal, LFTs are slightly elevated with a total bili around 4, this again is baseline for the patient.  Her UA is nitrite and leukocyte esterase negative with no bacteria.  CT of the abdomen has been done, this has been negative.  Patient is  functionally asplenic, none was seen on the CT scan.  As regards her vitals, on arrival she was tachycardic however this has responded to fluid resuscitation.  She is afebrile    Review of Systems  Review of Systems   Constitutional: Negative for chills and fever.   HENT: Negative for nosebleeds and sore throat.    Eyes: Negative for blurred vision and double vision.   Respiratory: Negative for cough and shortness of breath.    Cardiovascular: Negative for chest pain, palpitations and leg swelling.   Gastrointestinal: Positive for diarrhea, nausea and vomiting. Negative for abdominal pain.   Genitourinary: Negative for dysuria and urgency.   Musculoskeletal: Negative for back pain.   Skin: Negative for rash.   Neurological: Negative for dizziness, loss of consciousness and headaches.       Past Medical History   has a past medical history of Anemia, Anxiety, Asthma, Sickle cell anemia (HCC), and Snoring.    Surgical History   has a past surgical history that includes tonsillectomy and trev by laparoscopy (1/15/2019).     Family History  family history includes Diabetes in her maternal grandmother and paternal grandmother; GI Disease in her brother; Hypertension in her maternal grandmother and paternal grandmother; No Known Problems in her brother, brother, father, and mother; Other in her maternal uncle.     Social History   reports that she has never smoked. She has never used smokeless tobacco. She reports current alcohol use. She reports current drug use. Drug: Marijuana.   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 Taking?   acetaminophen (TYLENOL) 500 MG Tab >2 days ago at unknown Patient Yes Yes   Sig: Take 500 mg by mouth one time as needed for Moderate Pain.   citalopram (CELEXA) 40 MG Tab >4 days ago at unknown Patient No No   Sig: Take 1 Tab by mouth every  day.   etonogestrel (NEXPLANON) 68 MG Implant implant continuous at continuous Patient Yes Yes   Sig: Inject 1 Each under the skin one time.   hydrOXYzine HCl (ATARAX) 25 MG Tab Not Taking at Unknown time Patient No No   Sig: Take 1 tab by mouth once nightly as needed.   Patient not taking: Reported on 3/11/2021   montelukast (SINGULAIR) 10 MG Tab Not Taking at Unknown time Patient No No   Sig: Take 1 Tab by mouth every day.   Patient not taking: Reported on 3/11/2021      Facility-Administered Medications: None       Physical Exam  Temp:  [37.2 °C (98.9 °F)] 37.2 °C (98.9 °F)  Pulse:  [] 77  Resp:  [16-18] 18  BP: ()/(53-60) 111/56  SpO2:  [93 %-95 %] 94 %    Physical Exam  Vitals and nursing note reviewed.   Constitutional:       General: She is not in acute distress.     Appearance: Normal appearance. She is well-developed and normal weight. She is not diaphoretic.   HENT:      Head: Normocephalic and atraumatic.   Neck:      Vascular: No JVD.   Cardiovascular:      Rate and Rhythm: Normal rate and regular rhythm.      Heart sounds: No murmur.   Pulmonary:      Effort: Pulmonary effort is normal. No respiratory distress.      Breath sounds: No stridor. No wheezing or rales.   Abdominal:      General: There is no distension.      Palpations: Abdomen is soft.      Tenderness: There is no abdominal tenderness. There is no guarding or rebound.      Comments: Patient's abdominal exam is nontender throughout, no guarding or rebound or peritoneal findings.  No Rodriguez sign or McBurney sign.  She is status post lap trev   Musculoskeletal:         General: No tenderness.      Right lower leg: No edema.      Left lower leg: No edema.   Skin:     General: Skin is warm and dry.      Findings: No rash.   Neurological:      Mental Status: She is alert and oriented to person, place, and time.   Psychiatric:         Thought Content: Thought content normal.         Laboratory:  Recent Labs     03/11/21  1250   WBC  16.5*   RBC 2.69*   HEMOGLOBIN 8.2*   HEMATOCRIT 23.6*   MCV 87.7   MCH 30.5   MCHC 34.7   RDW 67.7*   PLATELETCT 501*   MPV 10.5     Recent Labs     03/11/21  1250   SODIUM 134*   POTASSIUM 3.2*   CHLORIDE 96   CO2 25   GLUCOSE 98   BUN 6*   CREATININE 0.34*   CALCIUM 9.4     Recent Labs     03/11/21  1250   ALTSGPT 55*   ASTSGOT 77*   ALKPHOSPHAT 57   TBILIRUBIN 4.9*   LIPASE 38   GLUCOSE 98         No results for input(s): NTPROBNP in the last 72 hours.      No results for input(s): TROPONINT in the last 72 hours.    Imaging:  CT-ABDOMEN-PELVIS W/O   Final Result      1.  No acute intra-abdominal findings      2.  The spleen is not visualized and may have been removed.      DX-CHEST-PORTABLE (1 VIEW)   Final Result      No acute cardiopulmonary process is seen.            Assessment/Plan:  I anticipate this patient is appropriate for observation status at this time.    Bandemia  Assessment & Plan  This is likely secondary to gastroenteritis.  Clinically patient has a quite a benign exam.  She has no abdominal pain, with clear lung fields benign abdominal exam.  Lab other than leukocytosis is benign  CT scan did not show any acute inflammatory or surgical process  She is however asplenic and did have a fever.  She has been given 1 dose of Rocephin here in the ED.  We will admit, monitor overnight and repeat labs in the a.m.  Continue fluid resuscitation  Hold on further antibiotics  Reevaluate labs, exam, and cultures in the a.m.    Hyponatremia  Assessment & Plan  Mild and likely hypovolemic in origin  Continue fluid resuscitation  Repeat BMP in a.m.    Hypokalemia  Assessment & Plan  Mild at 3.2  Give 20 mEq p.o. x1  Repeat lab in a.m.  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 Morin  On no active therapy  Vaccinations are up-to-date    Anxiety- (present on admission)  Assessment & Plan  As needed support

## 2021-03-13 NOTE — DISCHARGE INSTRUCTIONS
Discharge Instructions    Discharged to home by car with self. Discharged via walking, hospital escort: Yes.  Special equipment needed: Not Applicable    Be sure to schedule a follow-up appointment with your primary care doctor or any specialists as instructed.     Discharge Plan:   Diet Plan: Discussed  Activity Level: Discussed  Confirmed Follow up Appointment: Patient to Call and Schedule Appointment  Confirmed Symptoms Management: Discussed  Medication Reconciliation Updated: Yes  Influenza Vaccine Indication: Patient Refuses    I understand that a diet low in cholesterol, fat, and sodium is recommended for good health. Unless I have been given specific instructions below for another diet, I accept this instruction as my diet prescription.   Other diet:     Special Instructions: None    · Is patient discharged on Warfarin / Coumadin?   No     Depression / Suicide Risk    As you are discharged from this [facility] facility, it is important to learn how to keep safe from harming yourself.    Recognize the warning signs:  · Abrupt changes in personality, positive or negative- including increase in energy   · Giving away possessions  · Change in eating patterns- significant weight changes-  positive or negative  · Change in sleeping patterns- unable to sleep or sleeping all the time   · Unwillingness or inability to communicate  · Depression  · Unusual sadness, discouragement and loneliness  · Talk of wanting to die  · Neglect of personal appearance   · Rebelliousness- reckless behavior  · Withdrawal from people/activities they love  · Confusion- inability to concentrate     If you or a loved one observes any of these behaviors or has concerns about self-harm, here's what you can do:  · Talk about it- your feelings and reasons for harming yourself  · Remove any means that you might use to hurt yourself (examples: pills, rope, extension cords, firearm)  · Get professional help from the community (Mental Health,  Substance Abuse, psychological counseling)  · Do not be alone:Call your Safe Contact- someone whom you trust who will be there for you.  · Call your local CRISIS HOTLINE 202-2599 or 279-903-2539  · Call your local Children's Mobile Crisis Response Team [place] (310) 471-2376 or www.[employer]  · Call the toll free National Suicide Prevention Hotlines   · National Suicide Prevention Lifeline 132-640-OIHX (9831)  · National Hope Line Network 800-SUICIDE (389-3383)

## 2021-03-15 DIAGNOSIS — F51.01 PRIMARY INSOMNIA: ICD-10-CM

## 2021-03-15 NOTE — TELEPHONE ENCOUNTER
/Received request via: Patient    Was the patient seen in the last year in this department? Yes    Does the patient have an active prescription (recently filled or refills available) for medication(s) requested? No

## 2021-03-16 LAB
BACTERIA BLD CULT: NORMAL
BACTERIA BLD CULT: NORMAL
SIGNIFICANT IND 70042: NORMAL
SIGNIFICANT IND 70042: NORMAL
SITE SITE: NORMAL
SITE SITE: NORMAL
SOURCE SOURCE: NORMAL
SOURCE SOURCE: NORMAL

## 2021-03-16 RX ORDER — HYDROXYZINE HYDROCHLORIDE 25 MG/1
TABLET, FILM COATED ORAL
Qty: 90 TABLET | Refills: 2 | Status: SHIPPED | OUTPATIENT
Start: 2021-03-16 | End: 2022-05-02

## 2021-03-29 RX ORDER — ACETAMINOPHEN 325 MG/1
650 TABLET ORAL PRN
Status: CANCELLED | OUTPATIENT
Start: 2021-03-29

## 2021-03-29 RX ORDER — HEPARIN SODIUM (PORCINE) LOCK FLUSH IV SOLN 100 UNIT/ML 100 UNIT/ML
500 SOLUTION INTRAVENOUS PRN
Status: CANCELLED | OUTPATIENT
Start: 2021-03-29

## 2021-03-29 RX ORDER — SODIUM CHLORIDE 9 MG/ML
500 INJECTION, SOLUTION INTRAVENOUS ONCE
Status: CANCELLED | OUTPATIENT
Start: 2021-03-29 | End: 2021-03-29

## 2021-03-29 RX ORDER — DIPHENHYDRAMINE HCL 25 MG
25 TABLET ORAL ONCE
Status: CANCELLED | OUTPATIENT
Start: 2021-03-29 | End: 2021-03-29

## 2021-03-29 RX ORDER — ACETAMINOPHEN 325 MG/1
650 TABLET ORAL ONCE
Status: CANCELLED | OUTPATIENT
Start: 2021-03-29 | End: 2021-03-29

## 2021-03-29 RX ORDER — LIDOCAINE HYDROCHLORIDE 10 MG/ML
20 INJECTION, SOLUTION INFILTRATION; PERINEURAL
Status: CANCELLED | OUTPATIENT
Start: 2021-03-29

## 2021-03-31 ENCOUNTER — OFFICE VISIT (OUTPATIENT)
Dept: MEDICAL GROUP | Facility: PHYSICIAN GROUP | Age: 26
End: 2021-03-31
Payer: COMMERCIAL

## 2021-03-31 VITALS
OXYGEN SATURATION: 97 % | BODY MASS INDEX: 19.49 KG/M2 | WEIGHT: 117 LBS | RESPIRATION RATE: 20 BRPM | DIASTOLIC BLOOD PRESSURE: 60 MMHG | HEART RATE: 97 BPM | TEMPERATURE: 98 F | SYSTOLIC BLOOD PRESSURE: 108 MMHG | HEIGHT: 65 IN

## 2021-03-31 DIAGNOSIS — D57.1 HB-SS DISEASE WITHOUT CRISIS (HCC): ICD-10-CM

## 2021-03-31 DIAGNOSIS — M85.9 LOW BONE DENSITY FOR AGE: ICD-10-CM

## 2021-03-31 DIAGNOSIS — Z87.81 HISTORY OF VERTEBRAL FRACTURE: ICD-10-CM

## 2021-03-31 DIAGNOSIS — M85.80 OSTEOPENIA, UNSPECIFIED LOCATION: ICD-10-CM

## 2021-03-31 DIAGNOSIS — E55.9 VITAMIN D DEFICIENCY: ICD-10-CM

## 2021-03-31 PROCEDURE — 99214 OFFICE O/P EST MOD 30 MIN: CPT | Performed by: PHYSICIAN ASSISTANT

## 2021-03-31 RX ORDER — ERGOCALCIFEROL 1.25 MG/1
50000 CAPSULE ORAL
Qty: 30 CAPSULE | Refills: 0 | Status: SHIPPED | OUTPATIENT
Start: 2021-03-31 | End: 2021-09-03

## 2021-03-31 ASSESSMENT — FIBROSIS 4 INDEX: FIB4 SCORE: 0.47

## 2021-04-09 PROBLEM — M85.80 OSTEOPENIA: Status: ACTIVE | Noted: 2021-04-09

## 2021-04-09 PROBLEM — E55.9 VITAMIN D DEFICIENCY: Status: ACTIVE | Noted: 2021-04-09

## 2021-04-09 PROBLEM — Z87.81 HISTORY OF VERTEBRAL FRACTURE: Status: ACTIVE | Noted: 2021-04-09

## 2021-04-10 NOTE — PROGRESS NOTES
Chief Complaint   Patient presents with   • Follow-Up     Labs, and Hospital for stomache virus        HISTORY OF PRESENT ILLNESS: Alicia Tejada is an established 25 y.o. female here to discuss the evaluation and management of:      Patient is a pleasant 25-year-old female here today to discuss recent lab work results and follow-up on sickle cell anemia.  Since her last appointment patient has been seen by her hematologist and follows up with nephrology.  No further work-up at this time.    Reviewed some of patient's past medical records.  Per chart review patient has a positive past medical history for low bone density for her age, osteopenia, history of vertebral fracture, and vitamin D deficiency.  Patient sustained a vertebral compression fracture in the thoracic spine region in 2003.  She tells me that she took Fosamax for 2 years with Fosamax was discontinued.  She was told to take over-the-counter calcium and vitamin D.  Patient is past due for repeat DEXA scan.  Denies recurrent fractures.  She is currently not taking calcium or vitamin D supplementation.  Vitamin D level on 2/8/2021 was 13.      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    Current Outpatient Medications   Medication Sig Dispense Refill   • vitamin D, Ergocalciferol, (DRISDOL) 1.25 MG (36966 UT) Cap capsule Take 1 capsule by mouth every 7 days. 30 capsule 0   • hydrOXYzine HCl (ATARAX) 25 MG Tab Take 1 tab by mouth once nightly as needed. 90 tablet 2   • etonogestrel  (NEXPLANON) 68 MG Implant implant Inject 1 Each under the skin one time.     • citalopram (CELEXA) 40 MG Tab Take 1 Tab by mouth every day. 90 Tab 3   • montelukast (SINGULAIR) 10 MG Tab Take 1 Tab by mouth every day. 30 Tab 5     No current facility-administered medications for this visit.       Social History     Tobacco Use   • Smoking status: Never Smoker   • Smokeless tobacco: Never Used   Substance Use Topics   • Alcohol use: Yes     Comment: socially.    • Drug use: Never       Family Status   Relation Name Status   • Mo  Alive   • Fa  Alive   • Bro  Alive   • MGMo     • MGFa     • PGMo  Alive   • PGFa  Alive   • Bro  Alive   • Bro  Alive   • MUnc  Alive     Family History   Problem Relation Age of Onset   • No Known Problems Mother    • No Known Problems Father    • No Known Problems Brother    • Diabetes Maternal Grandmother         Type II   • Hypertension Maternal Grandmother    • Diabetes Paternal Grandmother         Type II   • Hypertension Paternal Grandmother    • No Known Problems Brother    • GI Disease Brother         GI Ulcers   • Other Maternal Uncle         Sickle Cell        ROS:  Review of Systems   Constitutional: Negative for fever, chills, weight loss and malaise/fatigue.   HENT: Negative for ear pain, nosebleeds, congestion, sore throat and neck pain.    Eyes: Negative for blurred vision.   Respiratory: Negative for cough, sputum production, shortness of breath and wheezing.    Cardiovascular: Negative for chest pain, palpitations, orthopnea and leg swelling.   Gastrointestinal: Negative for heartburn, nausea, vomiting and abdominal pain.   Genitourinary: Negative for dysuria, urgency and frequency.   Musculoskeletal: Negative for myalgias, back pain and joint pain.   Skin: Negative for rash and itching.   Neurological: Negative for dizziness, tingling, tremors, sensory change, focal weakness and headaches.   Endo/Heme/Allergies: Does not bruise/bleed easily.  "  Psychiatric/Behavioral: Negative for depression, suicidal ideas and memory loss.  The patient is not nervous/anxious and does not have insomnia.    All other systems reviewed and are negative except as in HPI.    Exam: /60 (BP Location: Left arm, Patient Position: Sitting, BP Cuff Size: Adult)   Pulse 97   Temp 36.7 °C (98 °F) (Temporal)   Resp 20   Ht 1.638 m (5' 4.5\")   Wt 53.1 kg (117 lb)   SpO2 97%  Body mass index is 19.77 kg/m².  General: Normal appearing. No distress.  HEENT: Normocephalic. Eyes conjunctiva clear lids without ptosis, ears normal shape and contour.  Neck: Supple without JVD. Thyroid is not enlarged.  Pulmonary: Clear to ausculation.  Normal effort. No rales, ronchi, or wheezing.  Cardiovascular: Regular rate and rhythm without murmur.   Abdomen: Soft, nontender, nondistended.   Neurologic: Grossly nonfocal.  Cranial nerves are normal.  Skin: Warm and dry.  No rashes or suspicious skin lesions.  Musculoskeletal: Normal gait. No extremity cyanosis, clubbing, or edema.  Psych: Normal mood and affect. Alert and oriented x3. Judgment and insight is normal.    Medical decision-making and discussion:  1. Low bone density for age  2. Osteopenia, unspecified location  3. History of vertebral fracture  4. Vitamin D deficiency  Reviewed some of patient's past medical records.  Discovered that patient has a history of vertebral fracture, osteopenia, low bone density for her age.  Chronic problem.  Unknown if stable.  DEXA scan is past due.  Patient has not been taking supplemental calcium or vitamin D.  She denies recurrent fractures.  Patient does have a positive past medical history for vertebral fracture in 2003.  She also has a positive past medical history for low bone density and in the past was placed on Fosamax.  The scan is in order to further evaluate patient.  Patient be contacted with results.  Patient has been prescribed vitamin D 50,000 units once weekly.  Advised patient if " she is not getting 1200 mg of calcium and her vitamin D to supplement with 500-1000 mg.  Discussed importance of weightbearing exercises.  Vitamin D level on 2/8/2021 was 13.    - DS-BONE DENSITY STUDY (DEXA); Future  - vitamin D, Ergocalciferol, (DRISDOL) 1.25 MG (61932 UT) Cap capsule; Take 1 capsule by mouth every 7 days.  Dispense: 30 capsule; Refill: 0    5. 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  Continue following up with nephrology and hematology.  Advised patient to work on hydration.  Advised pt to Increase protein intake and eat diet low in sodium.  Discussed ED precautions with patient.  - HGB; Future      Please note that this dictation was created using voice recognition software. I have made every reasonable attempt to correct obvious errors, but I expect that there are errors of grammar and possibly content that I did not discover before finalizing the note.    Assessment/Plan:  1. Low bone density for age  DS-BONE DENSITY STUDY (DEXA)   2. Osteopenia, unspecified location  DS-BONE DENSITY STUDY (DEXA)   3. History of vertebral fracture  DS-BONE DENSITY STUDY (DEXA)   4. Vitamin D deficiency  vitamin D, Ergocalciferol, (DRISDOL) 1.25 MG (84914 UT) Cap capsule   5. 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

## 2021-05-03 ENCOUNTER — APPOINTMENT (OUTPATIENT)
Dept: RADIOLOGY | Facility: MEDICAL CENTER | Age: 26
End: 2021-05-03
Attending: PHYSICIAN ASSISTANT
Payer: COMMERCIAL

## 2021-07-09 ENCOUNTER — EH NON-PROVIDER (OUTPATIENT)
Dept: OCCUPATIONAL MEDICINE | Facility: CLINIC | Age: 26
End: 2021-07-09

## 2021-07-09 DIAGNOSIS — Z02.89 ENCOUNTER FOR OCCUPATIONAL HEALTH EXAMINATION INVOLVING RESPIRATOR: ICD-10-CM

## 2021-07-09 PROCEDURE — 94010 BREATHING CAPACITY TEST: CPT | Performed by: PREVENTIVE MEDICINE

## 2021-07-09 PROCEDURE — 94375 RESPIRATORY FLOW VOLUME LOOP: CPT | Performed by: PREVENTIVE MEDICINE

## 2021-08-20 ENCOUNTER — NON-PROVIDER VISIT (OUTPATIENT)
Dept: MEDICAL GROUP | Facility: MEDICAL CENTER | Age: 26
End: 2021-08-20
Payer: COMMERCIAL

## 2021-08-20 DIAGNOSIS — Z11.1 ENCOUNTER FOR PPD TEST: ICD-10-CM

## 2021-08-20 PROCEDURE — 86580 TB INTRADERMAL TEST: CPT | Performed by: NURSE PRACTITIONER

## 2021-08-20 NOTE — PROGRESS NOTES
Alicia Tejada is a 26 y.o. female here for a non-provider visit for PPD placement -- Step 1 of 2    Reason for PPD:  school requirement    1. TB evaluation questionnaire completed by patient? Yes      -  If any answers marked yes did you contact a provider prior to placing? Not Indicated  2.  Patient notified to return to clinic for reading on: Monday 08/23/2021 before 2:21 pm, clinic closed on Sunday.  3.  PPD Placement documentation completed on TB evaluation questionnaire? Yes  4.  Location of TB evaluation questionnaire filed: Draw Room

## 2021-08-23 ENCOUNTER — NON-PROVIDER VISIT (OUTPATIENT)
Dept: MEDICAL GROUP | Facility: MEDICAL CENTER | Age: 26
End: 2021-08-23
Payer: COMMERCIAL

## 2021-08-23 LAB — TB WHEAL 3D P 5 TU DIAM: NORMAL MM

## 2021-08-23 NOTE — PROGRESS NOTES
Alicia Tejada is a 26 y.o. female here for a non-provider visit for PPD reading -- Step 1 of 2.      1.  Resulted in Epic under enter/edit results? Yes   2.  TB evaluation questionnaire scanned into chart and original given to patient?Yes      3. Was induration greater than 0 mm? No.    If Step 1 of 2, when is patient returning for second step (delete if N/A): Friday 08/27/2021    Routed to PCP? No

## 2021-08-27 ENCOUNTER — NON-PROVIDER VISIT (OUTPATIENT)
Dept: MEDICAL GROUP | Facility: MEDICAL CENTER | Age: 26
End: 2021-08-27
Payer: COMMERCIAL

## 2021-08-27 DIAGNOSIS — Z11.1 PPD SCREENING TEST: ICD-10-CM

## 2021-08-27 PROCEDURE — 86580 TB INTRADERMAL TEST: CPT | Performed by: NURSE PRACTITIONER

## 2021-08-27 NOTE — PROGRESS NOTES
Alicia Tejada is a 26 y.o. female here for a non-provider visit for PPD placement -- Step 2 of 2    Reason for PPD:  school requirement    1. TB evaluation questionnaire completed by patient? Yes      -  If any answers marked yes did you contact a provider prior to placing? Not Indicated  2.  Patient notified to return to clinic for reading on: Monday 8/30/21  3.  PPD Placement documentation completed on TB evaluation questionnaire? Yes  4.  Location of TB evaluation questionnaire filed: [facility]

## 2021-08-30 ENCOUNTER — NON-PROVIDER VISIT (OUTPATIENT)
Dept: MEDICAL GROUP | Facility: MEDICAL CENTER | Age: 26
End: 2021-08-30
Payer: COMMERCIAL

## 2021-08-30 LAB — TB WHEAL 3D P 5 TU DIAM: NORMAL MM

## 2021-08-30 NOTE — PROGRESS NOTES
Alicia Tejada is a 26 y.o. female here for a non-provider visit for PPD reading -- Step 2 of 2.      1.  Resulted in Epic under enter/edit results? Yes   2.  TB evaluation questionnaire scanned into chart and original given to patient?Yes      3. Was induration greater than 0 mm? No.    Routed to PCP? No

## 2021-09-03 ENCOUNTER — OFFICE VISIT (OUTPATIENT)
Dept: MEDICAL GROUP | Facility: MEDICAL CENTER | Age: 26
End: 2021-09-03
Payer: COMMERCIAL

## 2021-09-03 VITALS
HEART RATE: 80 BPM | BODY MASS INDEX: 19.33 KG/M2 | TEMPERATURE: 98.4 F | DIASTOLIC BLOOD PRESSURE: 60 MMHG | WEIGHT: 116 LBS | SYSTOLIC BLOOD PRESSURE: 90 MMHG | HEIGHT: 65 IN | OXYGEN SATURATION: 91 %

## 2021-09-03 DIAGNOSIS — M85.88 OSTEOPENIA OF SPINE: ICD-10-CM

## 2021-09-03 DIAGNOSIS — N18.1 CKD (CHRONIC KIDNEY DISEASE), SYMPTOM MANAGEMENT ONLY, STAGE 1: ICD-10-CM

## 2021-09-03 DIAGNOSIS — F41.9 ANXIETY: ICD-10-CM

## 2021-09-03 DIAGNOSIS — Z23 NEED FOR VACCINATION: ICD-10-CM

## 2021-09-03 DIAGNOSIS — Z87.81 HISTORY OF VERTEBRAL FRACTURE: ICD-10-CM

## 2021-09-03 DIAGNOSIS — Q89.01 FUNCTIONAL ASPLENIA: ICD-10-CM

## 2021-09-03 DIAGNOSIS — J45.20 MILD INTERMITTENT ASTHMA WITHOUT COMPLICATION: ICD-10-CM

## 2021-09-03 DIAGNOSIS — M85.9 LOW BONE DENSITY FOR AGE: ICD-10-CM

## 2021-09-03 DIAGNOSIS — D57.1 HB-SS DISEASE WITHOUT CRISIS (HCC): ICD-10-CM

## 2021-09-03 PROBLEM — D73.0 FUNCTIONAL ASPLENIA: Status: ACTIVE | Noted: 2021-03-12

## 2021-09-03 PROCEDURE — 90715 TDAP VACCINE 7 YRS/> IM: CPT | Performed by: NURSE PRACTITIONER

## 2021-09-03 PROCEDURE — 90471 IMMUNIZATION ADMIN: CPT | Performed by: NURSE PRACTITIONER

## 2021-09-03 PROCEDURE — 90734 MENACWYD/MENACWYCRM VACC IM: CPT | Performed by: NURSE PRACTITIONER

## 2021-09-03 PROCEDURE — 90472 IMMUNIZATION ADMIN EACH ADD: CPT | Performed by: NURSE PRACTITIONER

## 2021-09-03 PROCEDURE — 99214 OFFICE O/P EST MOD 30 MIN: CPT | Mod: 25 | Performed by: NURSE PRACTITIONER

## 2021-09-03 ASSESSMENT — FIBROSIS 4 INDEX: FIB4 SCORE: 0.48

## 2021-09-03 NOTE — PROGRESS NOTES
Alicia Tejada is a 26 y.o. female here to establish care:    HPI:   Asthma  Chronic. Has never had severe asthma attack, does get exacerbations with illness and exercise. Using albuterol as needed and QVAR BID as needed when sick. Doesn't need refills.     Last PFT showed mild restriction and obstruction    Osteopenia  Chronic. Diagnosed as a child with osteopenia, history of vertebral fracture. Taking 5000 IU Vit D daily, hasn't been taking Calcium supplement but is taking MVI with D and drinking/eating dairy.     Anxiety  Chronic. Stable on citalopram 40 mg daily. Using hydroxyzine rarely as needed for sleep.     Functional asplenia  CT abdomen showed no spleen, she has not had spleen removed. Unsure of meningitis vaccination history, did get basic vaccines for school but unsure of additional vaccines for asplenia.     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 Parents both SS cariers. Has only had one crisis in her life after moving to [place] due to the altitude change. Followed by hematology. Hemoglobin stable at 7-8. Did get transfused in March 2021 with hemoglobin of 6.1.     Current medicines (including changes today)  Current Outpatient Medications   Medication Sig Dispense Refill   • hydrOXYzine HCl (ATARAX) 25 MG Tab Take 1 tab by mouth once nightly as needed. 90 tablet 2   • etonogestrel (NEXPLANON) 68 MG Implant implant Inject 1 Each under the skin one time.     • citalopram (CELEXA) 40 MG Tab Take 1 Tab by mouth every day. 90 Tab 3     No current facility-administered medications for this visit.     She  has a past medical history of Anemia, Anxiety, Asthma, Mixed restrictive and obstructive lung disease (HCC) (8/5/2010), Nausea, vomiting, and diarrhea (3/12/2021), Sickle cell anemia (HCC), and Snoring. She also has no past medical history of Addisons disease ([facility]), Adrenal disorder (HCC), Arrhythmia, Arthritis, Blood transfusion without reported diagnosis, Cancer (HCC), Cataract, CHF (congestive  "heart failure) (HCC), Clotting disorder (HCC), Cushings syndrome (HCC), Depression, Diabetes (HCC), Diabetic neuropathy (HCC), Glaucoma, Goiter, Head ache, Heart attack (HCC), Heart murmur, HIV (human immunodeficiency virus infection) (HCC), Hyperlipidemia, Hypertension, IBD (inflammatory bowel disease), Kidney disease, Meningitis, Migraine, Muscle disorder, Osteoporosis, Parathyroid disorder (HCC), Pituitary disease (HCC), Pulmonary emphysema (HCC), Seizure (HCC), Stroke (HCC), Substance abuse (HCC), Thyroid disease, Tuberculosis, or Urinary tract infection.  She  has a past surgical history that includes tonsillectomy and trev by laparoscopy (1/15/2019).  Social History     Tobacco Use   • Smoking status: Never Smoker   • Smokeless tobacco: Never Used   Vaping Use   • Vaping Use: Never used   Substance Use Topics   • Alcohol use: Yes     Comment: socially.    • Drug use: Never     Social History     Social History Narrative   • Not on file     Family History   Problem Relation Age of Onset   • No Known Problems Mother    • No Known Problems Father    • No Known Problems Brother    • Diabetes Maternal Grandmother         Type II   • Hypertension Maternal Grandmother    • Diabetes Paternal Grandmother         Type II   • Hypertension Paternal Grandmother    • No Known Problems Brother    • GI Disease Brother         GI Ulcers   • Other Maternal Uncle         Sickle Cell      Family Status   Relation Name Status   • Mo  Alive   • Fa  Alive   • Bro  Alive   • MGMo     • MGFa     • PGMo  Alive   • PGFa  Alive   • Bro  Alive   • Bro  Alive   • MUnc  Alive         ROS  No chest pain, no abdominal pain, no rash.  Positive ROS as per HPI.  All other systems reviewed and are negative      Objective:     BP (!) 90/60 (BP Location: Right leg, Patient Position: Sitting, BP Cuff Size: Adult)   Pulse 80   Temp 36.9 °C (98.4 °F) (Temporal)   Ht 1.651 m (5' 5\")   Wt 52.6 kg (116 lb)   SpO2 91%  Body mass " index is 19.3 kg/m².  Physical Exam:    Constitutional: Alert, no distress.  Skin: Warm, dry, good turgor, no rashes in visible areas.  Eye: Equal, round and reactive, conjunctiva clear, lids normal.  ENMT: Lips without lesions, good dentition, oropharynx clear.  Neck: Trachea midline, no masses, no thyromegaly. No cervical or supraclavicular lymphadenopathy.  Respiratory: Unlabored respiratory effort, lungs clear to auscultation, no wheezes, no ronchi.  Cardiovascular: Normal S1, S2, no murmur, no edema.  Abdomen: Soft, non-tender, no masses, no hepatosplenomegaly.  Psych: Alert and oriented x3, normal affect and mood.        Assessment and Plan:   The following treatment plan was discussed    1. 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 CKD (chronic kidney disease), symptom management only, stage 1  Stable  Due for labs and follow up with nephrology  - Comp Metabolic Panel; Future    3. Osteopenia of spine  Unstable  Due for repeat Bone density  Continue Vit D 5000 IU daily and increase Calcium to 600 mg daily  - DS-BONE DENSITY STUDY (DEXA); Future    4. History of vertebral fracture  - DS-BONE DENSITY STUDY (DEXA); Future    5. Low bone density for age  - DS-BONE DENSITY STUDY (DEXA); Future    6. Mild intermittent asthma without complication  Stable  Continue albuterol and QVAR as needed    7. Anxiety  Stable  Continue citalopram 40 mg daily    8. Functional asplenia  Stable  Due to Sickle Cell disease  Update vaccinations    9. Need for vaccination  - Tdap =>8yo IM  - Meningococcal Conjugate Vaccine 4-Valent IM (Menactra)      Records reviewed  Followup: Return in about 6 months (around 3/3/2022).    I have placed the below orders and discussed them with an approved delegating provider. The MA is performing the below orders under the direction of Dr. Craft    Please note that this dictation was created using voice recognition  software. I have made every reasonable attempt to correct obvious errors, but I expect that there are errors of grammar and possibly content that I did not discover before finalizing the note.

## 2021-09-03 NOTE — ASSESSMENT & PLAN NOTE
Chronic. Stable on citalopram 40 mg daily. Using hydroxyzine rarely as needed for sleep.    Problem: Impaired Activities of Daily Living  Goal: Achieve highest/safest level of independence in self care  Description: Interventions:  - Assess ability and encourage patient to participate in ADLs to maximize function  - Promote sitting position [facility]

## 2021-09-03 NOTE — ASSESSMENT & PLAN NOTE
Chronic. Diagnosed as a child with osteopenia, history of vertebral fracture. Taking 5000 IU Vit D daily, hasn't been taking Calcium supplement but is taking MVI with D and drinking/eating dairy.

## 2021-09-03 NOTE — ASSESSMENT & PLAN NOTE
Chronic. Parents both SS cariers. Has only had one crisis in her life after moving to [place] due to the altitude change. Followed by hematology. Hemoglobin stable at 7-8. Did get transfused in March 2021 with hemoglobin of 6.1.

## 2021-09-03 NOTE — ASSESSMENT & PLAN NOTE
CT abdomen showed no spleen, she has not had spleen removed. Unsure of meningitis vaccination history, did get basic vaccines for school but unsure of additional vaccines for asplenia.

## 2021-09-13 ENCOUNTER — NON-PROVIDER VISIT (OUTPATIENT)
Dept: MEDICAL GROUP | Facility: MEDICAL CENTER | Age: 26
End: 2021-09-13
Payer: COMMERCIAL

## 2021-09-13 DIAGNOSIS — Z23 NEED FOR VACCINATION: ICD-10-CM

## 2021-09-13 PROCEDURE — 90621 MENB-FHBP VACC 2/3 DOSE IM: CPT | Performed by: NURSE PRACTITIONER

## 2021-09-13 PROCEDURE — 90471 IMMUNIZATION ADMIN: CPT | Performed by: NURSE PRACTITIONER

## 2021-09-13 NOTE — PROGRESS NOTES
"Alicia Tejada is a 26 y.o. female here for a non-provider visit for:   TRUMENBA (Men B) 1 of 2    Reason for immunization: Overdue/Provider Recommended  Immunization records indicate need for vaccine: Yes, confirmed with Epic  Minimum interval has been met for this vaccine: Yes  ABN completed: Not Indicated    VIS Dated  08/06/2021 was given to patient: Yes  All IAC Questionnaire questions were answered \"No.\"    Patient tolerated injection and no adverse effects were observed or reported: Yes    Pt scheduled for next dose in series: No  "

## 2021-09-28 ENCOUNTER — IMMUNIZATION (OUTPATIENT)
Dept: OCCUPATIONAL MEDICINE | Facility: CLINIC | Age: 26
End: 2021-09-28
Payer: COMMERCIAL

## 2021-09-28 DIAGNOSIS — Z23 NEED FOR VACCINATION: Primary | ICD-10-CM

## 2021-09-28 PROCEDURE — 90686 IIV4 VACC NO PRSV 0.5 ML IM: CPT | Performed by: NURSE PRACTITIONER

## 2021-10-11 ENCOUNTER — PATIENT MESSAGE (OUTPATIENT)
Dept: MEDICAL GROUP | Facility: MEDICAL CENTER | Age: 26
End: 2021-10-11

## 2021-10-11 DIAGNOSIS — K21.9 GASTROESOPHAGEAL REFLUX DISEASE, UNSPECIFIED WHETHER ESOPHAGITIS PRESENT: ICD-10-CM

## 2021-10-11 NOTE — TELEPHONE ENCOUNTER
Received request via: Patient    Was the patient seen in the last year in this department? Yes    Does the patient have an active prescription (recently filled or refills available) for medication(s) requested? Yes. Requesting change to [facility] pharmacy. Please advise.

## 2021-10-20 ENCOUNTER — HOSPITAL ENCOUNTER (OUTPATIENT)
Dept: RADIOLOGY | Facility: MEDICAL CENTER | Age: 26
End: 2021-10-20
Attending: NURSE PRACTITIONER
Payer: COMMERCIAL

## 2021-10-20 DIAGNOSIS — M85.9 LOW BONE DENSITY FOR AGE: ICD-10-CM

## 2021-10-20 DIAGNOSIS — Z87.81 HISTORY OF VERTEBRAL FRACTURE: ICD-10-CM

## 2021-10-20 DIAGNOSIS — M85.88 OSTEOPENIA OF SPINE: ICD-10-CM

## 2021-10-20 PROCEDURE — 77080 DXA BONE DENSITY AXIAL: CPT

## 2021-10-21 ENCOUNTER — TELEPHONE (OUTPATIENT)
Dept: MEDICAL GROUP | Facility: MEDICAL CENTER | Age: 26
End: 2021-10-21

## 2021-10-21 DIAGNOSIS — M85.859 OSTEOPENIA OF NECK OF FEMUR, UNSPECIFIED LATERALITY: ICD-10-CM

## 2021-10-21 DIAGNOSIS — D57.1 HB-SS DISEASE WITHOUT CRISIS (HCC): ICD-10-CM

## 2021-10-21 DIAGNOSIS — M81.0 OSTEOPOROSIS OF LUMBAR SPINE: ICD-10-CM

## 2021-10-21 DIAGNOSIS — M85.9 LOW BONE DENSITY FOR AGE: ICD-10-CM

## 2022-01-03 ENCOUNTER — OFFICE VISIT (OUTPATIENT)
Dept: MEDICAL GROUP | Facility: MEDICAL CENTER | Age: 27
End: 2022-01-03
Payer: COMMERCIAL

## 2022-01-03 ENCOUNTER — HOSPITAL ENCOUNTER (OUTPATIENT)
Facility: MEDICAL CENTER | Age: 27
End: 2022-01-03
Attending: NURSE PRACTITIONER
Payer: COMMERCIAL

## 2022-01-03 VITALS
TEMPERATURE: 98.8 F | HEART RATE: 105 BPM | SYSTOLIC BLOOD PRESSURE: 112 MMHG | WEIGHT: 112 LBS | OXYGEN SATURATION: 93 % | DIASTOLIC BLOOD PRESSURE: 66 MMHG | BODY MASS INDEX: 18.66 KG/M2 | HEIGHT: 65 IN

## 2022-01-03 DIAGNOSIS — J06.9 VIRAL URI WITH COUGH: ICD-10-CM

## 2022-01-03 DIAGNOSIS — Z20.822 CLOSE EXPOSURE TO COVID-19 VIRUS: ICD-10-CM

## 2022-01-03 LAB
EXTERNAL QUALITY CONTROL: NORMAL
FLUAV+FLUBV AG SPEC QL IA: NEGATIVE
INT CON NEG: NORMAL
INT CON POS: NORMAL
SARS-COV+SARS-COV-2 AG RESP QL IA.RAPID: NEGATIVE

## 2022-01-03 PROCEDURE — U0003 INFECTIOUS AGENT DETECTION BY NUCLEIC ACID (DNA OR RNA); SEVERE ACUTE RESPIRATORY SYNDROME CORONAVIRUS 2 (SARS-COV-2) (CORONAVIRUS DISEASE [COVID-19]), AMPLIFIED PROBE TECHNIQUE, MAKING USE OF HIGH THROUGHPUT TECHNOLOGIES AS DESCRIBED BY CMS-2020-01-R: HCPCS

## 2022-01-03 PROCEDURE — 87426 SARSCOV CORONAVIRUS AG IA: CPT | Mod: QW | Performed by: NURSE PRACTITIONER

## 2022-01-03 PROCEDURE — U0005 INFEC AGEN DETEC AMPLI PROBE: HCPCS

## 2022-01-03 PROCEDURE — 87804 INFLUENZA ASSAY W/OPTIC: CPT | Performed by: NURSE PRACTITIONER

## 2022-01-03 PROCEDURE — 99213 OFFICE O/P EST LOW 20 MIN: CPT | Mod: CS | Performed by: NURSE PRACTITIONER

## 2022-01-03 ASSESSMENT — FIBROSIS 4 INDEX: FIB4 SCORE: 0.48

## 2022-01-03 NOTE — ASSESSMENT & PLAN NOTE
Started 6 days ago congestion, scratchy throat, body aches, fatigue, runny nose.     No fevers, some SOB due to sinus congestion, but doesn't feel like it is in her lungs. No chest pain. Yellow, bloody mucous.     Taking nyquil at night, nothing during the day. Lemon tea.     Took home COVID tests yesterday, and Thursday which were negative, did in clinic rapid test which was negative.     Did have a covid exposure on Thursday after her symptoms initially started.

## 2022-01-04 DIAGNOSIS — J06.9 VIRAL URI WITH COUGH: ICD-10-CM

## 2022-01-04 DIAGNOSIS — Z20.822 CLOSE EXPOSURE TO COVID-19 VIRUS: ICD-10-CM

## 2022-01-20 NOTE — PROGRESS NOTES
Subjective:   Alicia Tejada is a 26 y.o. female here today for URI    Viral URI with cough  Started 6 days ago congestion, scratchy throat, body aches, fatigue, runny nose.     No fevers, some SOB due to sinus congestion, but doesn't feel like it is in her lungs. No chest pain. Yellow, bloody mucous.     Taking nyquil at night, nothing during the day. Lemon tea.     Took home COVID tests yesterday, and Thursday which were negative, did in clinic rapid test which was negative.     Did have a covid exposure on Thursday after her symptoms initially started.          Current medicines (including changes today)  Current Outpatient Medications   Medication Sig Dispense Refill   • esomeprazole (NEXIUM) 20 MG capsule Take 1 Capsule by mouth every morning before breakfast. 90 Capsule 3   • hydrOXYzine HCl (ATARAX) 25 MG Tab Take 1 tab by mouth once nightly as needed. 90 tablet 2   • etonogestrel (NEXPLANON) 68 MG Implant implant Inject 1 Each under the skin one time.     • citalopram (CELEXA) 40 MG Tab Take 1 Tab by mouth every day. 90 Tab 3     No current facility-administered medications for this visit.     She  has a past medical history of Anemia, Anxiety, Asthma, Mixed restrictive and obstructive lung disease (HCC) (8/5/2010), Nausea, vomiting, and diarrhea (3/12/2021), Sickle cell anemia ([facility]), and Snoring. She also has no past medical history of Addisons disease ([facility]), Adrenal disorder ([facility]), Arrhythmia, Arthritis, Blood transfusion without reported diagnosis, Cancer ([facility]), Cataract, CHF (congestive heart failure) ([facility]), Clotting disorder ([facility]), Cushings syndrome ([facility]), Depression, Diabetes ([facility]), Diabetic neuropathy ([facility]), Glaucoma, Goiter, Head ache, Heart attack ([facility]), Heart murmur, HIV (human immunodeficiency virus infection) ([facility]), Hyperlipidemia, Hypertension, IBD (inflammatory bowel disease), Kidney disease, Meningitis, Migraine, Muscle disorder, Osteoporosis, Parathyroid disorder ([facility]), Pituitary  "disease (HCC), Pulmonary emphysema (HCC), Seizure (HCC), Stroke (HCC), Substance abuse (HCC), Thyroid disease, Tuberculosis, or Urinary tract infection.    ROS   No chest pain, no shortness of breath, no abdominal pain  Positive ROS as per HPI.  All other systems reviewed and are negative.     Objective:     /66 (BP Location: Right arm, Patient Position: Sitting, BP Cuff Size: Adult)   Pulse (!) 105   Temp 37.1 °C (98.8 °F) (Temporal)   Ht 1.651 m (5' 5\")   Wt 50.8 kg (112 lb)   SpO2 93%  Body mass index is 18.64 kg/m².     Physical Exam:  Constitutional: Alert, no distress.  Skin: Warm, dry, good turgor, no rashes in visible areas.  Eye: Equal, round and reactive, conjunctiva clear, lids normal.  ENMT: Lips without lesions, good dentition, pharyngeal erythema with clear post nasal drainage, TMs with serous effusions bilaterally  Neck: Trachea midline, no masses, no thyromegaly. No cervical or supraclavicular lymphadenopathy  Respiratory: Unlabored respiratory effort, lungs clear to auscultation, no wheezes, no ronchi.  Cardiovascular: Normal S1, S2, no murmur, no edema.  Psych: Alert and oriented x3, normal affect and mood.        Assessment and Plan:   The following treatment plan was discussed    1. Viral URI with cough  Stable  Supportive care advised  Rapid flu and COVID negative  COVID PCR sent to lab  - SARS-CoV-2 PCR (24 hour In-House): Collect NP swab in VTM; Future  - POCT SARS-COV Antigen JOSE (Symptomatic Only)  - POCT Influenza A/B    2. Close exposure to COVID-19 virus  - SARS-CoV-2 PCR (24 hour In-House): Collect NP swab in VTM; Future  - POCT SARS-COV Antigen JOSE (Symptomatic Only)      Followup: Return if symptoms worsen or fail to improve.    I have placed the below orders and discussed them with an approved delegating provider. The MA is performing the below orders under the direction of Dr. Craft             "

## 2022-02-15 NOTE — PROGRESS NOTES
12 hour chart check  
Blood bank contacted due to delay in   
Blood collected, labeled and sent to lab   
Blood reordered for HBS negative,awaiting for blood transfusion,blood bank will call when blood available.  
Lab sent.  
MD informed and pt updated the situation of blood delay.  
Pt c/o severe pain on her arm,crying,pottassium given combination with ivf,still hurting,informed Hefercho MOON with orders.  
Received telephone report from ed nurse, patient brought to unit via transport, patient alert and oriented, assessment complete, vital signs within normal limits, patient not showing signs of distress, patient resting in bed, bed locked and in lowest position, beside table and call light are within reach, safety precautions in place, patient educated to use call light for assistance, patient verbalizes understanding.  
---

## 2022-02-17 ENCOUNTER — HOSPITAL ENCOUNTER (OUTPATIENT)
Dept: LAB | Facility: MEDICAL CENTER | Age: 27
End: 2022-02-17
Attending: NURSE PRACTITIONER
Payer: COMMERCIAL

## 2022-02-17 ENCOUNTER — TELEPHONE (OUTPATIENT)
Dept: MEDICAL GROUP | Facility: MEDICAL CENTER | Age: 27
End: 2022-02-17
Payer: COMMERCIAL

## 2022-02-17 DIAGNOSIS — Z00.00 ANNUAL PHYSICAL EXAM: ICD-10-CM

## 2022-02-17 DIAGNOSIS — N18.1 CKD (CHRONIC KIDNEY DISEASE), SYMPTOM MANAGEMENT ONLY, STAGE 1: ICD-10-CM

## 2022-02-17 DIAGNOSIS — D57.1 HB-SS DISEASE WITHOUT CRISIS (HCC): ICD-10-CM

## 2022-02-17 LAB
25(OH)D3 SERPL-MCNC: 31 NG/ML (ref 30–100)
ALBUMIN SERPL BCP-MCNC: 4.6 G/DL (ref 3.2–4.9)
ALBUMIN/GLOB SERPL: 1.6 G/DL
ALP SERPL-CCNC: 46 U/L (ref 30–99)
ALT SERPL-CCNC: 10 U/L (ref 2–50)
ANION GAP SERPL CALC-SCNC: 12 MMOL/L (ref 7–16)
AST SERPL-CCNC: 45 U/L (ref 12–45)
BASOPHILS # BLD AUTO: 0.6 % (ref 0–1.8)
BASOPHILS # BLD: 0.08 K/UL (ref 0–0.12)
BILIRUB SERPL-MCNC: 3.1 MG/DL (ref 0.1–1.5)
BUN SERPL-MCNC: 5 MG/DL (ref 8–22)
CALCIUM SERPL-MCNC: 9.2 MG/DL (ref 8.4–10.2)
CHLORIDE SERPL-SCNC: 102 MMOL/L (ref 96–112)
CO2 SERPL-SCNC: 22 MMOL/L (ref 20–33)
CREAT SERPL-MCNC: 0.33 MG/DL (ref 0.5–1.4)
EOSINOPHIL # BLD AUTO: 0.25 K/UL (ref 0–0.51)
EOSINOPHIL NFR BLD: 1.8 % (ref 0–6.9)
ERYTHROCYTE [DISTWIDTH] IN BLOOD BY AUTOMATED COUNT: 75 FL (ref 35.9–50)
FERRITIN SERPL-MCNC: 47.9 NG/ML (ref 10–291)
GLOBULIN SER CALC-MCNC: 2.9 G/DL (ref 1.9–3.5)
GLUCOSE SERPL-MCNC: 89 MG/DL (ref 65–99)
HCT VFR BLD AUTO: 20.3 % (ref 37–47)
HGB BLD-MCNC: 7 G/DL (ref 12–16)
IMM GRANULOCYTES # BLD AUTO: 0.07 K/UL (ref 0–0.11)
IMM GRANULOCYTES NFR BLD AUTO: 0.5 % (ref 0–0.9)
LYMPHOCYTES # BLD AUTO: 4.18 K/UL (ref 1–4.8)
LYMPHOCYTES NFR BLD: 30.6 % (ref 22–41)
MCH RBC QN AUTO: 29 PG (ref 27–33)
MCHC RBC AUTO-ENTMCNC: 34.5 G/DL (ref 33.6–35)
MCV RBC AUTO: 84.2 FL (ref 81.4–97.8)
MONOCYTES # BLD AUTO: 0.77 K/UL (ref 0–0.85)
MONOCYTES NFR BLD AUTO: 5.6 % (ref 0–13.4)
NEUTROPHILS # BLD AUTO: 8.31 K/UL (ref 2–7.15)
NEUTROPHILS NFR BLD: 60.9 % (ref 44–72)
NRBC # BLD AUTO: 0.07 K/UL
NRBC BLD-RTO: 0.5 /100 WBC
PLATELET # BLD AUTO: 589 K/UL (ref 164–446)
PMV BLD AUTO: 9.1 FL (ref 9–12.9)
POTASSIUM SERPL-SCNC: 3.8 MMOL/L (ref 3.6–5.5)
PROT SERPL-MCNC: 7.5 G/DL (ref 6–8.2)
RBC # BLD AUTO: 2.41 M/UL (ref 4.2–5.4)
SODIUM SERPL-SCNC: 136 MMOL/L (ref 135–145)
TSH SERPL DL<=0.005 MIU/L-ACNC: 1.18 UIU/ML (ref 0.38–5.33)
WBC # BLD AUTO: 13.7 K/UL (ref 4.8–10.8)

## 2022-02-17 PROCEDURE — 82306 VITAMIN D 25 HYDROXY: CPT

## 2022-02-17 PROCEDURE — 36415 COLL VENOUS BLD VENIPUNCTURE: CPT

## 2022-02-17 PROCEDURE — 84443 ASSAY THYROID STIM HORMONE: CPT

## 2022-02-17 PROCEDURE — 85025 COMPLETE CBC W/AUTO DIFF WBC: CPT

## 2022-02-17 PROCEDURE — 82728 ASSAY OF FERRITIN: CPT

## 2022-02-17 PROCEDURE — 80053 COMPREHEN METABOLIC PANEL: CPT

## 2022-02-18 ENCOUNTER — OFFICE VISIT (OUTPATIENT)
Dept: MEDICAL GROUP | Facility: MEDICAL CENTER | Age: 27
End: 2022-02-18
Payer: COMMERCIAL

## 2022-02-18 VITALS
HEART RATE: 77 BPM | HEIGHT: 65 IN | TEMPERATURE: 97.8 F | OXYGEN SATURATION: 95 % | WEIGHT: 113 LBS | SYSTOLIC BLOOD PRESSURE: 102 MMHG | BODY MASS INDEX: 18.83 KG/M2 | DIASTOLIC BLOOD PRESSURE: 62 MMHG

## 2022-02-18 DIAGNOSIS — F41.9 ANXIETY: ICD-10-CM

## 2022-02-18 DIAGNOSIS — J43.9 MIXED RESTRICTIVE AND OBSTRUCTIVE LUNG DISEASE (HCC): ICD-10-CM

## 2022-02-18 DIAGNOSIS — J98.4 MIXED RESTRICTIVE AND OBSTRUCTIVE LUNG DISEASE (HCC): ICD-10-CM

## 2022-02-18 DIAGNOSIS — D57.1 HB-SS DISEASE WITHOUT CRISIS (HCC): ICD-10-CM

## 2022-02-18 DIAGNOSIS — M81.8 PREMATURE OSTEOPOROSIS: ICD-10-CM

## 2022-02-18 PROBLEM — J06.9 VIRAL URI WITH COUGH: Status: RESOLVED | Noted: 2022-01-03 | Resolved: 2022-02-18

## 2022-02-18 PROCEDURE — 99214 OFFICE O/P EST MOD 30 MIN: CPT | Performed by: NURSE PRACTITIONER

## 2022-02-18 RX ORDER — CITALOPRAM 20 MG/1
20 TABLET ORAL DAILY
Qty: 90 TABLET | Refills: 1 | Status: SHIPPED | OUTPATIENT
Start: 2022-02-18 | End: 2022-06-13 | Stop reason: SDUPTHER

## 2022-02-18 RX ORDER — CITALOPRAM 20 MG/1
40 TABLET ORAL DAILY
Qty: 90 TABLET | Refills: 1 | Status: SHIPPED | OUTPATIENT
Start: 2022-02-18 | End: 2022-02-18

## 2022-02-18 RX ORDER — ALBUTEROL SULFATE 90 UG/1
2 AEROSOL, METERED RESPIRATORY (INHALATION) EVERY 6 HOURS PRN
COMMUNITY

## 2022-02-18 ASSESSMENT — ANXIETY QUESTIONNAIRES
3. WORRYING TOO MUCH ABOUT DIFFERENT THINGS: MORE THAN HALF THE DAYS
6. BECOMING EASILY ANNOYED OR IRRITABLE: MORE THAN HALF THE DAYS
4. TROUBLE RELAXING: NOT AT ALL
GAD7 TOTAL SCORE: 5
1. FEELING NERVOUS, ANXIOUS, OR ON EDGE: SEVERAL DAYS
5. BEING SO RESTLESS THAT IT IS HARD TO SIT STILL: NOT AT ALL
7. FEELING AFRAID AS IF SOMETHING AWFUL MIGHT HAPPEN: NOT AT ALL
2. NOT BEING ABLE TO STOP OR CONTROL WORRYING: NOT AT ALL

## 2022-02-18 ASSESSMENT — PATIENT HEALTH QUESTIONNAIRE - PHQ9
SUM OF ALL RESPONSES TO PHQ QUESTIONS 1-9: 5
CLINICAL INTERPRETATION OF PHQ2 SCORE: 1
5. POOR APPETITE OR OVEREATING: 0 - NOT AT ALL

## 2022-02-18 ASSESSMENT — FIBROSIS 4 INDEX: FIB4 SCORE: 0.63

## 2022-02-18 NOTE — PATIENT INSTRUCTIONS
[facility]  [address] [facility], [address]  [facility] [address]  Phone: [phone]  Fax: [phone]

## 2022-02-18 NOTE — ASSESSMENT & PLAN NOTE
DEXA 10/2021 showed osteoporotic in the lumbar spine, osteopenic in the right femur. Last DEXA was when she was a child. Had not been taking Vitamin D or Calcium supplementation. Now taking Calcium 1200 mg and Vit D3 5000 IU, however she does forget to take these often.     She is doing some weight bearing exercise, does not use tobacco, does not drink heavily.     Does take Nexium daily due to significant GERD. History of vertebral fracture as a child.     She has not yet scheduled with endocrinology.

## 2022-02-18 NOTE — ASSESSMENT & PLAN NOTE
Chronic. Parents both SS cariers. Has only had one crisis in her life after moving to [place] due to the altitude change. Followed by hematology, has follow up appointment next week. Hemoglobin stable at 7.0. Did get transfused in March 2021 with hemoglobin of 6.1.     She is feeling well.

## 2022-02-18 NOTE — ASSESSMENT & PLAN NOTE
Chronic. Currently taking citalopram 40 mg daily. Using hydroxyzine rarely as needed for sleep. Reports that she would like to stop the citalopram as she forgets to take it often and then will feel bad. Requesting to taper off.

## 2022-02-18 NOTE — PROGRESS NOTES
Subjective:   Alicia Tejada is a 26 y.o. female here today for lab follow up    Anxiety  Chronic. Currently taking citalopram 40 mg daily. Using hydroxyzine rarely as needed for sleep. Reports that she would like to stop the citalopram as she forgets to take it often and then will feel bad. Requesting to taper off.     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 Parents both SS cariers. Has only had one crisis in her life after moving to [place] due to the altitude change. Followed by hematology, has follow up appointment next week. Hemoglobin stable at 7.0. Did get transfused in March 2021 with hemoglobin of 6.1.     She is feeling well.     Mixed restrictive and obstructive lung disease (HCC)  Stable. Using albuterol as needed. No longer requiring daily maintenance inhaler.     Premature osteoporosis  DEXA 10/2021 showed osteoporotic in the lumbar spine, osteopenic in the right femur. Last DEXA was when she was a child. Had not been taking Vitamin D or Calcium supplementation. Now taking Calcium 1200 mg and Vit D3 5000 IU, however she does forget to take these often.     She is doing some weight bearing exercise, does not use tobacco, does not drink heavily.     Does take Nexium daily due to significant GERD. History of vertebral fracture as a child.     She has not yet scheduled with endocrinology.        Current medicines (including changes today)  Current Outpatient Medications   Medication Sig Dispense Refill   • albuterol 108 (90 Base) MCG/ACT Aero Soln inhalation aerosol Inhale 2 Puffs every 6 hours as needed.     • citalopram (CELEXA) 20 MG Tab Take 2 Tablets by mouth every day. 90 Tablet 1   • esomeprazole (NEXIUM) 20 MG capsule Take 1 Capsule by mouth every morning before breakfast. 90 Capsule 3   • hydrOXYzine HCl (ATARAX) 25 MG Tab Take 1 tab by mouth once nightly as needed. 90 tablet 2   • etonogestrel (NEXPLANON) 68 MG Implant implant Inject 1 Each under the skin one time.       No current  "facility-administered medications for this visit.     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    ROS   No chest pain, no shortness of breath, no abdominal pain  Positive ROS as per HPI.  All other systems reviewed and are negative.     Objective:     /62 (BP Location: Right arm, Patient Position: Sitting, BP Cuff Size: Adult)   Pulse 77   Temp 36.6 °C (97.8 °F) (Temporal)   Ht 1.651 m (5' 5\")   Wt 51.3 kg (113 lb)   SpO2 95%  Body mass index is 18.8 kg/m².     Physical Exam:  Constitutional: Alert, no distress.  Skin: Warm, dry, good turgor, no rashes in visible areas.  Eye: Equal, round and reactive, conjunctiva clear, lids normal.  ENMT: Mask in place  Respiratory: Unlabored respiratory effort,  Psych: Alert and oriented x3, normal affect and mood.      Assessment and Plan:   The following treatment plan was discussed    1. 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 Mixed restrictive and obstructive lung disease (HCC)  Stable  Continue albuterol as needed  Due for PFT    3. Anxiety  Stable  Start weaning off celexa, reduce dose to 20 mg daily for 2-4 weeks, then she will notify me if she is ready to reduce to 10 mg daily  - citalopram (CELEXA) 20 MG Tab; Take 1 Tablets by mouth every day.  Dispense: 90 Tablet; Refill: 1    4. Premature osteoporosis  Unstable  Reinforced importance of taking calcium 1200 mg daily and vitamin D 5000 IU daily  Follow-up with endocrinology  Continue weightbearing exercise      Followup: Return in about 6 months (around 8/18/2022).    I have placed the below orders and discussed them with an approved delegating provider. The MA is performing the below orders under the direction of Dr. Craft           "

## 2022-02-22 ENCOUNTER — TELEMEDICINE (OUTPATIENT)
Dept: HEMATOLOGY ONCOLOGY | Facility: MEDICAL CENTER | Age: 27
End: 2022-02-22
Payer: COMMERCIAL

## 2022-02-22 VITALS
OXYGEN SATURATION: 92 % | DIASTOLIC BLOOD PRESSURE: 58 MMHG | TEMPERATURE: 98.1 F | BODY MASS INDEX: 18.8 KG/M2 | WEIGHT: 113 LBS | SYSTOLIC BLOOD PRESSURE: 102 MMHG | HEART RATE: 97 BPM

## 2022-02-22 DIAGNOSIS — M81.8 PREMATURE OSTEOPOROSIS: ICD-10-CM

## 2022-02-22 DIAGNOSIS — Z09 ENCOUNTER FOR HEMATOLOGY FOLLOW-UP: ICD-10-CM

## 2022-02-22 DIAGNOSIS — D57.1 HB-SS DISEASE WITHOUT CRISIS (HCC): ICD-10-CM

## 2022-02-22 PROCEDURE — 99214 OFFICE O/P EST MOD 30 MIN: CPT | Mod: 95 | Performed by: NURSE PRACTITIONER

## 2022-02-22 ASSESSMENT — ENCOUNTER SYMPTOMS
NAUSEA: 0
SHORTNESS OF BREATH: 0
DIZZINESS: 0
CONSTIPATION: 1
WEIGHT LOSS: 0
WHEEZING: 0
BACK PAIN: 0
DIARRHEA: 1
VOMITING: 0
BLOOD IN STOOL: 0
INSOMNIA: 0
FEVER: 0
TINGLING: 0
HEADACHES: 0
COUGH: 0
CHILLS: 0
MYALGIAS: 0
PALPITATIONS: 0

## 2022-02-22 ASSESSMENT — FIBROSIS 4 INDEX: FIB4 SCORE: 0.63

## 2022-02-22 NOTE — LETTER
March 8, 2022        Alicia Tejada  [address]  [facility] [address]        Dear Alicia:    Here are the lab slips for you!       If you have any questions or concerns, please don't hesitate to call.        Sincerely,        SIXTO Fu.    Electronically Signed

## 2022-02-22 NOTE — Clinical Note
Can you please mail her the lab reqs?  Also, please forward referral to the sickle cell clinic -- I put the info in but [facility] may not know

## 2022-02-22 NOTE — PROGRESS NOTES
Subjective     Alicia Tejada is a 26 y.o. female who presents with Anemia (Sickle Cell; Hb-SS disease without crisis [facility])          HPI   Ms. Tejada presents evaluation of sickle cell anemia without crisis.Visit is conducted as an on demand video visit using Zoom and patient is unaccompanied today.    Patient diagnosed in childhood and was followed closely by [facility] and moved to [place] at around 8 years of age. She has never required Hydrea therapy and manages symptoms very well with hydration and diet. She rarely requires transfusion, last completed in 3/2021.    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 She has noticed subtle jaundice at sclera that correlates with increased bilirubin and improves with hydration. She is otherwise at her baseline and asymptomatic.      Review of Systems   Constitutional: Positive for malaise/fatigue (in nursing school, working FT). Negative for chills, fever and weight loss.   Eyes:        Noticing increased jaundice at sclera - correlates with increased bili   Respiratory: Negative for cough, shortness of breath and wheezing.    Cardiovascular: Negative for chest pain, palpitations and leg swelling.   Gastrointestinal: Positive for constipation (correlates with added calcium; varies w/ hydration & diet) and diarrhea (s/p cholecystectomy in 1/2019; varies w/ hydration & diet). Negative for blood in stool, melena, nausea and vomiting.   Genitourinary: Negative for dysuria and hematuria.        No dark urine   Musculoskeletal: Negative for back pain, joint pain and myalgias.   Neurological: Negative for dizziness, tingling and headaches.   Psychiatric/Behavioral: The patient does not have insomnia.          No Known Allergies        Current Outpatient Medications on File Prior to Visit   Medication Sig Dispense Refill   • albuterol 108 (90 Base) MCG/ACT Aero Soln inhalation aerosol Inhale 2 Puffs every 6 hours as needed.     •  citalopram (CELEXA) 20 MG Tab Take 1 Tablet by mouth every day. 90 Tablet 1   • esomeprazole (NEXIUM) 20 MG capsule Take 1 Capsule by mouth every morning before breakfast. 90 Capsule 3   • hydrOXYzine HCl (ATARAX) 25 MG Tab Take 1 tab by mouth once nightly as needed. 90 tablet 2   • etonogestrel (NEXPLANON) 68 MG Implant implant Inject 1 Each under the skin one time.       No current facility-administered medications on file prior to visit.           Objective     /58   Pulse 97   Temp 36.7 °C (98.1 °F) (Temporal)   Wt 51.3 kg (113 lb)   SpO2 92%   BMI 18.80 kg/m²      Physical Exam  Constitutional:       General: She is not in acute distress.     Appearance: Normal appearance. She is not ill-appearing.   Pulmonary:      Effort: Pulmonary effort is normal.   Skin:     Coloration: Skin is not jaundiced or pale.   Neurological:      Mental Status: She is alert and oriented to person, place, and time. Mental status is at baseline.   Psychiatric:         Mood and Affect: Mood normal.         No visits with results within 1 Day(s) from this visit.   Latest known visit with results is:   Hospital Outpatient Visit on 02/17/2022   Component Date Value Ref Range Status   • TSH 02/17/2022 1.180  0.380 - 5.330 uIU/mL Final    Comment: Please note new reference ranges effective 12/19/2017 12:30 PM    Pregnant Females, 1st Trimester  0.050-3.700  Pregnant Females, 2nd Trimester  0.310-4.350  Pregnant Females, 3rd Trimester  0.410-5.180     • Ferritin 02/17/2022 47.9  10.0 - 291.0 ng/mL Final   • 25-Hydroxy   Vitamin D 25 02/17/2022 31  30 - 100 ng/mL Final    Comment: Adult Ranges:   <20 ng/mL - Deficiency  20-29 ng/mL - Insufficiency   ng/mL - Sufficiency  Effective 3/18/2020, this electrochemiluminescence binding assay is  performed using Roche ivan e immunoassay analyzer.  The Elecsys Vitamin D  total II assay is intended for the quantitative determination of total 25  hydroxyvitamin D in human serum and  plasma. This assay is to be used as an  aid in the assessment of vitamin D sufficiency in adults.     • Sodium 02/17/2022 136  135 - 145 mmol/L Final   • Potassium 02/17/2022 3.8  3.6 - 5.5 mmol/L Final   • Chloride 02/17/2022 102  96 - 112 mmol/L Final   • Co2 02/17/2022 22  20 - 33 mmol/L Final   • Anion Gap 02/17/2022 12.0  7.0 - 16.0 Final   • Glucose 02/17/2022 89  65 - 99 mg/dL Final   • Bun 02/17/2022 5 (A) 8 - 22 mg/dL Final   • Creatinine 02/17/2022 0.33 (A) 0.50 - 1.40 mg/dL Final   • Calcium 02/17/2022 9.2  8.4 - 10.2 mg/dL Final   • AST(SGOT) 02/17/2022 45  12 - 45 U/L Final   • ALT(SGPT) 02/17/2022 10  2 - 50 U/L Final   • Alkaline Phosphatase 02/17/2022 46  30 - 99 U/L Final   • Total Bilirubin 02/17/2022 3.1 (A) 0.1 - 1.5 mg/dL Final   • Albumin 02/17/2022 4.6  3.2 - 4.9 g/dL Final   • Total Protein 02/17/2022 7.5  6.0 - 8.2 g/dL Final   • Globulin 02/17/2022 2.9  1.9 - 3.5 g/dL Final   • A-G Ratio 02/17/2022 1.6  g/dL Final   • WBC 02/17/2022 13.7 (A) 4.8 - 10.8 K/uL Final   • RBC 02/17/2022 2.41 (A) 4.20 - 5.40 M/uL Final   • Hemoglobin 02/17/2022 7.0 (A) 12.0 - 16.0 g/dL Final   • Hematocrit 02/17/2022 20.3 (A) 37.0 - 47.0 % Final   • MCV 02/17/2022 84.2  81.4 - 97.8 fL Final   • MCH 02/17/2022 29.0  27.0 - 33.0 pg Final   • MCHC 02/17/2022 34.5  33.6 - 35.0 g/dL Final   • RDW 02/17/2022 75.0 (A) 35.9 - 50.0 fL Final   • Platelet Count 02/17/2022 589 (A) 164 - 446 K/uL Final   • MPV 02/17/2022 9.1  9.0 - 12.9 fL Final   • Neutrophils-Polys 02/17/2022 60.90  44.00 - 72.00 % Final   • Lymphocytes 02/17/2022 30.60  22.00 - 41.00 % Final   • Monocytes 02/17/2022 5.60  0.00 - 13.40 % Final   • Eosinophils 02/17/2022 1.80  0.00 - 6.90 % Final   • Basophils 02/17/2022 0.60  0.00 - 1.80 % Final   • Immature Granulocytes 02/17/2022 0.50  0.00 - 0.90 % Final   • Nucleated RBC 02/17/2022 0.50  /100 WBC Final   • Neutrophils (Absolute) 02/17/2022 8.31 (A) 2.00 - 7.15 K/uL Final    Includes immature  neutrophils, if present.   • Lymphs (Absolute) 02/17/2022 4.18  1.00 - 4.80 K/uL Final   • Monos (Absolute) 02/17/2022 0.77  0.00 - 0.85 K/uL Final   • Eos (Absolute) 02/17/2022 0.25  0.00 - 0.51 K/uL Final   • Baso (Absolute) 02/17/2022 0.08  0.00 - 0.12 K/uL Final   • Immature Granulocytes (abs) 02/17/2022 0.07  0.00 - 0.11 K/uL Final   • NRBC (Absolute) 02/17/2022 0.07  K/uL Final   • GFR If  02/17/2022 >60  >60 mL/min/1.73 m 2 Final   • GFR If Non  02/17/2022 >60  >60 mL/min/1.73 m 2 Final                   DEXA  10/20/2021 4:13 PM  HISTORY/REASON FOR EXAM:  History of sickle cell, history of fracture as a child     TECHNIQUE/EXAM DESCRIPTION AND NUMBER OF VIEWS:  Dual x-ray bone densitometry was performed from the L1 through L4 levels and from the proximal right femur utilizing the GE Prodigy unit.     COMPARISON:   None     FINDINGS:  The mean bone mineral density for the lumbar spine is 0.844 g/cm2, which corresponds to a T score of -2.8 and a Z score of -3.1.     The proximal right femur has a mean bone mineral density of 0.857 g/cm2, with a T score of -1.2 and a Z score of -1.9.     IMPRESSION:  According to the World Health Organization classification, bone mineral density of this patient is osteoporotic in the lumbar spine, osteopenic in the right femur.     FRAX score not obtained for this patient.              Assessment & Plan        1. 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 Encounter for hematology follow-up     3. Premature osteoporosis            1.  Osteoporosis: She has initiated calcium w/ vitamin D and WBE as per PCP.    2.  Sickle Cell: stable with infrequent crisis, last transfusion 3/2021. She manages symptoms very well with hydration and diet. CBC and CMP have been evaluated with bilirubin mildly elevated, no obvious jaundice, mild sclera which patient notes improved with better  hydration. She denies mental fuzziness or malaise. Patient is advised to complete labs for closer monitoring, every 3 months, and return in 6 months for reevaluation, sooner as needed.    She is amenable to referral to establish with [facility], Dr. Mancia.        The patient verbalized agreement and understanding of current plan. All questions and concerns were addressed at time of visit.    Please note that this dictation was created using voice recognition software. I have made every reasonable attempt to correct obvious errors, but I expect that there are errors of grammar and possibly content that I did not discover before finalizing the note.      This evaluation was conducted via Zoom using secure and encrypted videoconferencing technology. The patient was in a private location outside of their home in the [facility].    The patient's identity was confirmed and verbal consent was obtained for this virtual visit.

## 2022-02-24 ENCOUNTER — NON-PROVIDER VISIT (OUTPATIENT)
Dept: MEDICAL GROUP | Facility: MEDICAL CENTER | Age: 27
End: 2022-02-24
Payer: COMMERCIAL

## 2022-02-24 DIAGNOSIS — Z23 NEED FOR VACCINATION: ICD-10-CM

## 2022-02-24 PROCEDURE — 90471 IMMUNIZATION ADMIN: CPT | Performed by: NURSE PRACTITIONER

## 2022-02-24 PROCEDURE — 90621 MENB-FHBP VACC 2/3 DOSE IM: CPT | Performed by: NURSE PRACTITIONER

## 2022-02-24 NOTE — PROGRESS NOTES
"Alicia Tejada is a 26 y.o. female here for a non-provider visit for:   TRUMENBA (Men B) 2 of 2    Reason for immunization: continue or complete series started at the office  Immunization records indicate need for vaccine: Yes, confirmed with Epic  Minimum interval has been met for this vaccine: Yes  ABN completed: Not Indicated    VIS Dated  08/06/2021 was given to patient: Yes  All IAC Questionnaire questions were answered \"No.\"    Patient tolerated injection and no adverse effects were observed or reported: Yes    Pt scheduled for next dose in series: Not Indicated  "

## 2022-03-16 ENCOUNTER — HOSPITAL ENCOUNTER (OUTPATIENT)
Facility: MEDICAL CENTER | Age: 27
End: 2022-03-16
Attending: OBSTETRICS & GYNECOLOGY
Payer: COMMERCIAL

## 2022-03-16 ENCOUNTER — GYNECOLOGY VISIT (OUTPATIENT)
Dept: OBGYN | Facility: CLINIC | Age: 27
End: 2022-03-16
Payer: COMMERCIAL

## 2022-03-16 VITALS — DIASTOLIC BLOOD PRESSURE: 62 MMHG | WEIGHT: 114 LBS | SYSTOLIC BLOOD PRESSURE: 95 MMHG | BODY MASS INDEX: 18.97 KG/M2

## 2022-03-16 DIAGNOSIS — Z00.00 ANNUAL PHYSICAL EXAM: ICD-10-CM

## 2022-03-16 PROCEDURE — 87491 CHLMYD TRACH DNA AMP PROBE: CPT

## 2022-03-16 PROCEDURE — 87624 HPV HI-RISK TYP POOLED RSLT: CPT

## 2022-03-16 PROCEDURE — 99395 PREV VISIT EST AGE 18-39: CPT | Performed by: OBSTETRICS & GYNECOLOGY

## 2022-03-16 PROCEDURE — 87591 N.GONORRHOEAE DNA AMP PROB: CPT

## 2022-03-16 PROCEDURE — 88175 CYTOPATH C/V AUTO FLUID REDO: CPT

## 2022-03-16 ASSESSMENT — FIBROSIS 4 INDEX: FIB4 SCORE: 0.63

## 2022-03-16 NOTE — PROGRESS NOTES
Alicia Tejada is a 26 y.o. No obstetric history on file. female who presents for her Annual Gynecologic Exam      HPI Comments: Pt presents for her annual well woman exam.   Pt reports that she has had some irregular spotting and cycling with Nexplanon.  Wondering if there is anything to be worried about.    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    Patient's last menstrual period was 03/02/2022.    Review of Systems:   Pertinent positives documented in HPI and all other systems reviewed & are negative    PGYN Hx: None    All PMH, PSH, allergies, social history and FH reviewed and updated today:  Past Medical History:   Diagnosis Date   • Anemia    • Anxiety    • Asthma     Inhaler use PRN.   • Mixed restrictive and obstructive lung disease (HCC) 8/5/2010   • Nausea, vomiting, and diarrhea 3/12/2021   • Premature osteoporosis    • Sickle cell anemia (HCC)    • Snoring     Negative sleep study.       Past Surgical History:   Procedure Laterality Date   • ANUP BY LAPAROSCOPY  1/15/2019    Procedure: laparoscopic cholecystectomy;  Surgeon: Noel Greene M.D.;  Location: SURGERY [facility];  Service: General   • TONSILLECTOMY         Medications:   Current Outpatient Medications Ordered in Epic   Medication Sig Dispense Refill   • albuterol 108 (90 Base) MCG/ACT Aero Soln inhalation aerosol Inhale 2 Puffs every 6 hours as needed.     • citalopram (CELEXA) 20 MG Tab Take 1 Tablet by mouth every day. 90 Tablet 1   • esomeprazole (NEXIUM) 20 MG capsule Take 1 Capsule by mouth every morning before breakfast. 90 Capsule 3   • hydrOXYzine HCl (ATARAX) 25 MG Tab Take 1 tab by mouth once nightly as needed. 90 tablet 2   • etonogestrel (NEXPLANON) 68 MG Implant implant Inject 1 Each under the skin one time.       No current [facility]-ordered facility-administered medications on file.       Patient has no known allergies.    Social History     Socioeconomic History   •  Marital status: Single   Tobacco Use   • Smoking status: Never Smoker   • Smokeless tobacco: Never Used   Vaping Use   • Vaping Use: Never used   Substance and Sexual Activity   • Alcohol use: Yes     Comment: socially.    • Drug use: Never   • Sexual activity: Yes     Partners: Male     Birth control/protection: Condom, Implant       Family History   Problem Relation Age of Onset   • No Known Problems Mother    • No Known Problems Father    • No Known Problems Brother    • Diabetes Maternal Grandmother         Type II   • Hypertension Maternal Grandmother    • Diabetes Paternal Grandmother         Type II   • Hypertension Paternal Grandmother    • No Known Problems Brother    • GI Disease Brother         GI Ulcers   • Other Maternal Uncle         Sickle Cell           Objective:   Vital measurements:  BP (!) 95/62 (BP Location: Left arm, Patient Position: Sitting, BP Cuff Size: Adult)   Wt 51.7 kg (114 lb)   Body mass index is 18.97 kg/m². (Goal BM I>18 <25)    Physical Exam   Nursing note and vitals reviewed.  Constitutional: She is oriented to person, place, and time. She appears well-developed and well-nourished. No distress.     HEENT:   Head: Normocephalic and atraumatic.   Right Ear: External ear normal.   Left Ear: External ear normal.   Nose: Nose normal.   Eyes: Conjunctivae and EOM are normal. Pupils are equal, round, and reactive to light. No scleral icterus.     Neck: Normal range of motion. Neck supple.     Breast: Symmetrical, normal consistency without masses., No dimpling or skin changes, Normal nipples without discharge, no axillary lymphadenopathy    Abdominal: Soft. Bowel sounds are normal. She exhibits no distension and no mass. No tenderness. She has no rebound and no guarding.     Genitourinary:  Pelvic exam was performed with patient supine.  External genitalia with no abnormal pigmentation, labial fusion, rashes, tenderness, lesions or injury to the labia bilaterally.  BUS normal  Vagina is  pink and moist with no lesions, foul discharge, erythema, tenderness or bleeding. No foreign body around the vagina or signs of injury.   Cervix exhibits no motion tenderness, no discharge and no friability, no lesions.   Uterus is 7 cm and not deviated, not enlarged, not fixed and not tender.  Right adnexa displays no mass, no tenderness and no fullness.  Left adnexa displays no mass, no tenderness and no fullness.     Musculoskeletal: Normal range of motion. Non tender. She exhibits no edema and no tenderness.     Lymphadenopathy: She has no cervical or supraclavicular adenopathy.     Neurological: She is alert and oriented to person, place, and time. She exhibits normal muscle tone.     Skin: Skin is warm and dry. No rash noted. She is not diaphoretic. No erythema. No pallor.     Psychiatric: She has a normal mood and affect. Her behavior is normal. Judgment and thought content normal.        Assessment:     1. Annual physical exam           Plan:   Pap and physical exam performed.  Will call patient with results  Self breast awareness discussed.  Encouraged exercise and proper diet.  Mammograms starting @ age 40 annually.  See medications and orders placed in encounter report.  Follow up in 1 year for annual exam or sooner as needed    Patient has Nexplanon in place.  Due to be removed in October 2023.  Discussed with patient that spotting and irregular cycling can be normal with Nexplanon.  It has no effect on its efficacy

## 2022-03-17 DIAGNOSIS — Z00.00 ANNUAL PHYSICAL EXAM: ICD-10-CM

## 2022-03-17 LAB
C TRACH DNA GENITAL QL NAA+PROBE: NEGATIVE
CYTOLOGY REG CYTOL: NORMAL
HPV HR 12 DNA CVX QL NAA+PROBE: NEGATIVE
HPV16 DNA SPEC QL NAA+PROBE: NEGATIVE
HPV18 DNA SPEC QL NAA+PROBE: NEGATIVE
N GONORRHOEA DNA GENITAL QL NAA+PROBE: NEGATIVE
SPECIMEN SOURCE: NORMAL
SPECIMEN SOURCE: NORMAL

## 2022-03-18 ENCOUNTER — OFFICE VISIT (OUTPATIENT)
Dept: MEDICAL GROUP | Facility: MEDICAL CENTER | Age: 27
End: 2022-03-18
Payer: COMMERCIAL

## 2022-03-18 VITALS
SYSTOLIC BLOOD PRESSURE: 98 MMHG | TEMPERATURE: 98.1 F | OXYGEN SATURATION: 93 % | BODY MASS INDEX: 19.16 KG/M2 | HEART RATE: 85 BPM | HEIGHT: 65 IN | DIASTOLIC BLOOD PRESSURE: 62 MMHG | WEIGHT: 115 LBS

## 2022-03-18 DIAGNOSIS — R41.840 DIFFICULTY CONCENTRATING: ICD-10-CM

## 2022-03-18 DIAGNOSIS — F90.2 ATTENTION DEFICIT HYPERACTIVITY DISORDER (ADHD), COMBINED TYPE: ICD-10-CM

## 2022-03-18 DIAGNOSIS — F41.9 ANXIETY: ICD-10-CM

## 2022-03-18 DIAGNOSIS — F33.0 MILD EPISODE OF RECURRENT MAJOR DEPRESSIVE DISORDER (HCC): ICD-10-CM

## 2022-03-18 PROCEDURE — 99214 OFFICE O/P EST MOD 30 MIN: CPT | Performed by: NURSE PRACTITIONER

## 2022-03-18 RX ORDER — BUPROPION HYDROCHLORIDE 150 MG/1
150 TABLET ORAL EVERY MORNING
Qty: 30 TABLET | Refills: 11 | Status: SHIPPED | OUTPATIENT
Start: 2022-03-18 | End: 2023-01-19

## 2022-03-18 ASSESSMENT — ANXIETY QUESTIONNAIRES
3. WORRYING TOO MUCH ABOUT DIFFERENT THINGS: MORE THAN HALF THE DAYS
2. NOT BEING ABLE TO STOP OR CONTROL WORRYING: SEVERAL DAYS
GAD7 TOTAL SCORE: 9
5. BEING SO RESTLESS THAT IT IS HARD TO SIT STILL: MORE THAN HALF THE DAYS
7. FEELING AFRAID AS IF SOMETHING AWFUL MIGHT HAPPEN: NOT AT ALL
4. TROUBLE RELAXING: NOT AT ALL
6. BECOMING EASILY ANNOYED OR IRRITABLE: NEARLY EVERY DAY
1. FEELING NERVOUS, ANXIOUS, OR ON EDGE: SEVERAL DAYS

## 2022-03-18 ASSESSMENT — PATIENT HEALTH QUESTIONNAIRE - PHQ9
CLINICAL INTERPRETATION OF PHQ2 SCORE: 1
5. POOR APPETITE OR OVEREATING: 0 - NOT AT ALL
SUM OF ALL RESPONSES TO PHQ QUESTIONS 1-9: 5

## 2022-03-18 ASSESSMENT — FIBROSIS 4 INDEX: FIB4 SCORE: 0.63

## 2022-03-18 NOTE — ASSESSMENT & PLAN NOTE
Patient reports noticing symptoms in college, didn't have issues in middle school, may have had the start of symptoms in high school. Symptoms have been worsening over time, feels like it is more difficult to get motivated to do work, start school work, concentrating on work, often gets distracted when trying to complete school work.       ADHD  - Complains of:     He has all positive criteria for ADHD, inattentive type, as bellow:  For older adolescents and adults (age 17 and older), five or more symptoms are required (Table)1  · Several inattentive or hyperactive-impulsive symptoms present prior to age 12 years NO  · Several inattentive or hyperactive-impulsive symptoms present in two or more settings (e.g. at home, school or work; with friends or relatives; in other activities) WORK AND SCHOOL  · Clear evidence that the symptoms interfere with, or reduce the quality of, social, academic or occupational functioning YES  · Symptoms do not occur exclusively during the course of schizophrenia or another psychotic disorder, and are not better explained by another mental disorder (e.g. mood disorder, anxiety disorder, dissociative disorder, personality disorder, substance intoxication or withdrawal NO     Organization:  - poor organizational skills (home, office, desk, or car is extremely messy and cluttered)  YES  - trouble starting and finishing projects   YES  - chronic lateness YES  - frequently forgetting appointments, commitments, and deadlines   NO  - constantly losing or misplacing things (keys, wallet, phone, documents, bills)  NO  - underestimating the time it will take you to complete tasks YES     Impulse control:  - frequently interrupt others or talk over them YES  - have poor self-control YES  - blurt out thoughts that are rude or inappropriate without thinking NO  - have addictive tendencies YES  - act recklessly or spontaneously without regard for consequences YES  - trouble behaving in socially  appropriate ways (such as sitting still during a long meeting)  NO     Emotional difficulties: improved  - sense of underachievement NO  - doesn’t deal well with frustration YES  - easily flustered and stressed out  NO  - irritability or mood swings YES  - trouble staying motivated  YES  - hypersensitivity to criticism NO  - short, often explosive, temper NO  - low self-esteem and sense of insecurity YES     Symptoms of inattention Symptoms of hyperactivity and impulsivity   Often fails to give close attention to detail or makes mistakes YES Often fidgets with or taps hands and feet, or squirms in seat  YES   Often has difficulty sustaining attention in tasks or activities  YES Often leaves seat in situations when remaining seated is expected YES   Often does not seem to listen when spoken to directly NO Often runs and climbs in situations where it is inappropriate (in adolescents or adults, may be limited to feeling restless) NO   Often does not follow through on instructions and fails to finish schoolwork or workplace duties NO  Often unable to play or engage in leisure activities quietly NO   Often has difficulty organising tasks and activities YES Is often ‘on the go’, acting as if ‘driven by a motor’ YES   Often avoids, dislikes or is reluctant to engage in tasks that require sustained mental effort NO Often talks excessively  YES      Often easily distracted by external stimuli YES Often has difficulty waiting his or her turn  NO      Often forgetful in daily activities NO

## 2022-03-18 NOTE — ASSESSMENT & PLAN NOTE
Chronic. Currently taking citalopram 40 mg daily. Using hydroxyzine rarely as needed for sleep.     VALERIE 9

## 2022-03-18 NOTE — PROGRESS NOTES
Subjective:   Alicia Tejada is a 26 y.o. female here today for concentration issues:    Difficulty concentrating  Patient reports noticing symptoms in college, didn't have issues in middle school, may have had the start of symptoms in high school. Symptoms have been worsening over time, feels like it is more difficult to get motivated to do work, start school work, concentrating on work, often gets distracted when trying to complete school work.       ADHD  - Complains of:     He has all positive criteria for ADHD, inattentive type, as bellow:  For older adolescents and adults (age 17 and older), five or more symptoms are required (Table)1  · Several inattentive or hyperactive-impulsive symptoms present prior to age 12 years NO  · Several inattentive or hyperactive-impulsive symptoms present in two or more settings (e.g. at home, school or work; with friends or relatives; in other activities) WORK AND SCHOOL  · Clear evidence that the symptoms interfere with, or reduce the quality of, social, academic or occupational functioning YES  · Symptoms do not occur exclusively during the course of schizophrenia or another psychotic disorder, and are not better explained by another mental disorder (e.g. mood disorder, anxiety disorder, dissociative disorder, personality disorder, substance intoxication or withdrawal NO     Organization:  - poor organizational skills (home, office, desk, or car is extremely messy and cluttered)  YES  - trouble starting and finishing projects   YES  - chronic lateness YES  - frequently forgetting appointments, commitments, and deadlines   NO  - constantly losing or misplacing things (keys, wallet, phone, documents, bills)  NO  - underestimating the time it will take you to complete tasks YES     Impulse control:  - frequently interrupt others or talk over them YES  - have poor self-control YES  - blurt out thoughts that are rude or inappropriate without thinking NO  - have addictive  tendencies YES  - act recklessly or spontaneously without regard for consequences YES  - trouble behaving in socially appropriate ways (such as sitting still during a long meeting)  NO     Emotional difficulties: improved  - sense of underachievement NO  - doesn’t deal well with frustration YES  - easily flustered and stressed out  NO  - irritability or mood swings YES  - trouble staying motivated  YES  - hypersensitivity to criticism NO  - short, often explosive, temper NO  - low self-esteem and sense of insecurity YES     Symptoms of inattention Symptoms of hyperactivity and impulsivity   Often fails to give close attention to detail or makes mistakes YES Often fidgets with or taps hands and feet, or squirms in seat  YES   Often has difficulty sustaining attention in tasks or activities  YES Often leaves seat in situations when remaining seated is expected YES   Often does not seem to listen when spoken to directly NO Often runs and climbs in situations where it is inappropriate (in adolescents or adults, may be limited to feeling restless) NO   Often does not follow through on instructions and fails to finish schoolwork or workplace duties NO  Often unable to play or engage in leisure activities quietly NO   Often has difficulty organising tasks and activities YES Is often ‘on the go’, acting as if ‘driven by a motor’ YES   Often avoids, dislikes or is reluctant to engage in tasks that require sustained mental effort NO Often talks excessively  YES      Often easily distracted by external stimuli YES Often has difficulty waiting his or her turn  NO      Often forgetful in daily activities NO              Anxiety  Chronic. Currently taking citalopram 40 mg daily. Using hydroxyzine rarely as needed for sleep.     VALERIE 9    Mild episode of recurrent major depressive disorder (HCC)  Chronic. Stable on citalopram 40 mg daily. Still having some breakthrough symptoms.        Current medicines (including changes  "today)  Current Outpatient Medications   Medication Sig Dispense Refill   • buPROPion (WELLBUTRIN XL) 150 MG XL tablet Take 1 Tablet by mouth every morning. 30 Tablet 11   • citalopram (CELEXA) 20 MG Tab Take 1 Tablet by mouth every day. (Patient taking differently: Take 40 mg by mouth every day.) 90 Tablet 1   • albuterol 108 (90 Base) MCG/ACT Aero Soln inhalation aerosol Inhale 2 Puffs every 6 hours as needed.     • esomeprazole (NEXIUM) 20 MG capsule Take 1 Capsule by mouth every morning before breakfast. 90 Capsule 3   • hydrOXYzine HCl (ATARAX) 25 MG Tab Take 1 tab by mouth once nightly as needed. 90 tablet 2   • etonogestrel (NEXPLANON) 68 MG Implant implant Inject 1 Each under the skin one time.       No current facility-administered medications for this visit.     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    ROS   No chest pain, no shortness of breath, no abdominal pain  Positive ROS as per HPI.  All other systems reviewed and are negative.     Objective:     BP (!) 98/62 (BP Location: Right arm, Patient Position: Sitting, BP Cuff Size: Adult)   Pulse 85   Temp 36.7 °C (98.1 °F) (Temporal)   Ht 1.651 m (5' 5\")   Wt 52.2 kg (115 lb)   SpO2 93%  Body mass index is 19.14 kg/m².     Physical Exam:  Constitutional: Alert, no distress.  Skin: Warm, dry, good turgor, no rashes in visible areas.  Eye: Equal, round and reactive, conjunctiva clear, lids normal.  ENMT: Mask in place  Respiratory: Unlabored respiratory effort  Psych: Alert and oriented x3, normal affect and mood.      Assessment and Plan:   The following treatment plan was discussed    1. Difficulty concentrating  Unstable  May be related to breakthrough depression vs some co-existing ADHD  Will do trial of wellbutrin 150 mg daily to see if this helps both " depression and concentration/motivation.   If symptoms are not improved, consider stimulant.     2. Attention deficit hyperactivity disorder (ADHD), combined type  Unstable  Trial wellbutrin with citalopram  If not effective, consider stimulant  - buPROPion (WELLBUTRIN XL) 150 MG XL tablet; Take 1 Tablet by mouth every morning.  Dispense: 30 Tablet; Refill: 11    3. Mild episode of recurrent major depressive disorder (HCC)  Unstable  Continue citalopram 40 mg daily   Add wellbutrin 150 mg daily  - buPROPion (WELLBUTRIN XL) 150 MG XL tablet; Take 1 Tablet by mouth every morning.  Dispense: 30 Tablet; Refill: 11    4. Anxiety  Stable  Continue citalopram 40 mg daily and hydroxyzine as needed        Followup: Return if symptoms worsen or fail to improve.    I have placed the below orders and discussed them with an approved delegating provider. The MA is performing the below orders under the direction of Dr. Craft

## 2022-05-02 ENCOUNTER — OFFICE VISIT (OUTPATIENT)
Dept: MEDICAL GROUP | Facility: MEDICAL CENTER | Age: 27
End: 2022-05-02
Payer: COMMERCIAL

## 2022-05-02 VITALS
DIASTOLIC BLOOD PRESSURE: 68 MMHG | BODY MASS INDEX: 19.16 KG/M2 | HEART RATE: 95 BPM | SYSTOLIC BLOOD PRESSURE: 102 MMHG | HEIGHT: 65 IN | WEIGHT: 115 LBS | TEMPERATURE: 99.3 F | OXYGEN SATURATION: 96 %

## 2022-05-02 DIAGNOSIS — J98.4 MIXED RESTRICTIVE AND OBSTRUCTIVE LUNG DISEASE (HCC): ICD-10-CM

## 2022-05-02 DIAGNOSIS — J06.9 VIRAL UPPER RESPIRATORY TRACT INFECTION: ICD-10-CM

## 2022-05-02 DIAGNOSIS — R05.9 COUGH: ICD-10-CM

## 2022-05-02 DIAGNOSIS — J43.9 MIXED RESTRICTIVE AND OBSTRUCTIVE LUNG DISEASE (HCC): ICD-10-CM

## 2022-05-02 PROBLEM — R01.1 HEART MURMUR: Status: ACTIVE | Noted: 2022-05-02

## 2022-05-02 PROCEDURE — 99214 OFFICE O/P EST MOD 30 MIN: CPT | Performed by: NURSE PRACTITIONER

## 2022-05-02 RX ORDER — METHYLPREDNISOLONE 4 MG/1
TABLET ORAL
Qty: 21 TABLET | Refills: 0 | Status: SHIPPED | OUTPATIENT
Start: 2022-05-02 | End: 2022-08-25

## 2022-05-02 RX ORDER — BENZONATATE 100 MG/1
100 CAPSULE ORAL 3 TIMES DAILY PRN
Qty: 60 CAPSULE | Refills: 0 | Status: SHIPPED | OUTPATIENT
Start: 2022-05-02 | End: 2022-08-25

## 2022-05-02 RX ORDER — CODEINE PHOSPHATE AND GUAIFENESIN 10; 100 MG/5ML; MG/5ML
5-10 SOLUTION ORAL NIGHTLY PRN
Qty: 60 ML | Refills: 0 | Status: SHIPPED | OUTPATIENT
Start: 2022-05-02 | End: 2022-05-08

## 2022-05-02 RX ORDER — CODEINE PHOSPHATE AND GUAIFENESIN 10; 100 MG/5ML; MG/5ML
5-10 SOLUTION ORAL NIGHTLY PRN
Qty: 60 ML | Refills: 0 | Status: SHIPPED
Start: 2022-05-02 | End: 2022-05-02

## 2022-05-02 RX ORDER — AZITHROMYCIN 250 MG/1
TABLET, FILM COATED ORAL
Qty: 6 TABLET | Refills: 0 | Status: SHIPPED | OUTPATIENT
Start: 2022-05-02 | End: 2022-05-07

## 2022-05-02 ASSESSMENT — FIBROSIS 4 INDEX: FIB4 SCORE: 0.63

## 2022-05-02 NOTE — PROGRESS NOTES
Subjective:   Alicia Tejada is a 26 y.o. female here today for URI    Viral upper respiratory tract infection  Started Wedn/Thursday with sinus congestion while on vacation in [place]. Started using afrin for that. Cough started Saturday. Did have a runny nose and sore throat which has resolved. Nose is still congested    No fevers, chills, body aches, shortness of breath, wheezing, chest pain. Is having malaise but hasn't slept in two day due to persistent cough keeping her up.     Has been taking theraflu sinus and pain, theraflu cough syrup. Stopped afrin yesterday as it was 3rd day. Restarted flonase this morning.     Did home covid test yesterday which was negative.            Current medicines (including changes today)  Current Outpatient Medications   Medication Sig Dispense Refill   • benzonatate (TESSALON) 100 MG Cap Take 1 Capsule by mouth 3 times a day as needed for Cough. 60 Capsule 0   • guaifenesin-codeine (ROBITUSSIN AC) Solution oral solution Take 5-10 mL by mouth at bedtime as needed for Cough for up to 6 days. 60 mL 0   • buPROPion (WELLBUTRIN XL) 150 MG XL tablet Take 1 Tablet by mouth every morning. 30 Tablet 11   • albuterol 108 (90 Base) MCG/ACT Aero Soln inhalation aerosol Inhale 2 Puffs every 6 hours as needed.     • citalopram (CELEXA) 20 MG Tab Take 1 Tablet by mouth every day. (Patient taking differently: Take 40 mg by mouth every day.) 90 Tablet 1   • esomeprazole (NEXIUM) 20 MG capsule Take 1 Capsule by mouth every morning before breakfast. 90 Capsule 3   • etonogestrel (NEXPLANON) 68 MG Implant implant Inject 1 Each under the skin one time.       No current facility-administered medications for this visit.     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    ROS   No chest pain, no shortness of  "breath, no abdominal pain  Positive ROS as per HPI.  All other systems reviewed and are negative.     Objective:     /68   Pulse 95   Temp 37.4 °C (99.3 °F) (Temporal)   Ht 1.651 m (5' 5\")   Wt 52.2 kg (115 lb)   SpO2 96%  Body mass index is 19.14 kg/m².     Physical Exam:  Constitutional: Alert, no distress.  Skin: Warm, dry, good turgor, no rashes in visible areas.  Eye: Equal, round and reactive, conjunctiva clear, lids normal.  ENMT: Lips without lesions, good dentition, pharyngeal erythema, clear post nasal drainage, moderate mucosal edema of nasal turbinates right > left  Neck: Trachea midline, no masses, no thyromegaly. + Bilateral cervical lymphadenopathy  Respiratory: Unlabored respiratory effort, lungs slightly diminished throughout, no wheezes, no ronchi.  Cardiovascular: Normal S1, S2, + murmur, no edema.  Psych: Alert and oriented x3, normal affect and mood.        Assessment and Plan:   The following treatment plan was discussed    1. Viral upper respiratory tract infection  Unstable  Concern for exacerbation of asthma, RX for azithromycin and medrol x 5 days sent to pharmacy  Continue albuterol as needed  Continue supportive care, continue Daytime OTC medications  RX for robitussin AC sent for insomnia related to cough, hasn't slept in 2 days  Tessalon as needed during the day.   Report worsening s/s   - benzonatate (TESSALON) 100 MG Cap; Take 1 Capsule by mouth 3 times a day as needed for Cough.  Dispense: 60 Capsule; Refill: 0  - guaifenesin-codeine (ROBITUSSIN AC) Solution oral solution; Take 5-10 mL by mouth at bedtime as needed for Cough for up to 6 days.  Dispense: 60 mL; Refill: 0  - azithromycin (ZITHROMAX) 250 MG Tab; 2 tabs by mouth day 1, 1 tab by mouth days 2-5  Dispense: 6 Tablet; Refill: 0  - methylPREDNISolone (MEDROL DOSEPAK) 4 MG Tablet Therapy Pack; As directed on the packaging label.  Dispense: 21 Tablet; Refill: 0    2. Cough  As above  - benzonatate (TESSALON) 100 MG " Cap; Take 1 Capsule by mouth 3 times a day as needed for Cough.  Dispense: 60 Capsule; Refill: 0  - guaifenesin-codeine (ROBITUSSIN AC) Solution oral solution; Take 5-10 mL by mouth at bedtime as needed for Cough for up to 6 days.  Dispense: 60 mL; Refill: 0    3. Mixed restrictive and obstructive lung disease (HCC)  Stable  Mild exacerbation due to URI  Azithromycin and medrol x 5 days  Continue albuterol as needed  - azithromycin (ZITHROMAX) 250 MG Tab; 2 tabs by mouth day 1, 1 tab by mouth days 2-5  Dispense: 6 Tablet; Refill: 0  - methylPREDNISolone (MEDROL DOSEPAK) 4 MG Tablet Therapy Pack; As directed on the packaging label.  Dispense: 21 Tablet; Refill: 0      Followup: Return if symptoms worsen or fail to improve.    I have placed the below orders and discussed them with an approved delegating provider. The MA is performing the below orders under the direction of Dr. Craft

## 2022-05-02 NOTE — ASSESSMENT & PLAN NOTE
Started Wedn/Thursday with sinus congestion while on vacation in [place]. Started using afrin for that. Cough started Saturday. Did have a runny nose and sore throat which has resolved. Nose is still congested    No fevers, chills, body aches, shortness of breath, wheezing, chest pain. Is having malaise but hasn't slept in two day due to persistent cough keeping her up.     Has been taking theraflu sinus and pain, theraflu cough syrup. Stopped afrin yesterday as it was 3rd day. Restarted flonase this morning.     Did home covid test yesterday which was negative.

## 2022-06-09 ENCOUNTER — TELEPHONE (OUTPATIENT)
Dept: MEDICAL GROUP | Facility: MEDICAL CENTER | Age: 27
End: 2022-06-09
Payer: COMMERCIAL

## 2022-06-09 DIAGNOSIS — H10.13 ALLERGIC CONJUNCTIVITIS OF BOTH EYES: ICD-10-CM

## 2022-06-09 RX ORDER — AZELASTINE HYDROCHLORIDE 0.5 MG/ML
1 SOLUTION/ DROPS OPHTHALMIC 2 TIMES DAILY
Qty: 6 ML | Refills: 1 | Status: SHIPPED | OUTPATIENT
Start: 2022-06-09 | End: 2023-09-06

## 2022-06-10 NOTE — TELEPHONE ENCOUNTER
Patient reports bilateral tearing with itching and redness in the eyes after getting a kitten.  Denies any other symptoms at this time

## 2022-06-13 ENCOUNTER — TELEPHONE (OUTPATIENT)
Dept: MEDICAL GROUP | Facility: MEDICAL CENTER | Age: 27
End: 2022-06-13
Payer: COMMERCIAL

## 2022-06-13 DIAGNOSIS — F41.9 ANXIETY: ICD-10-CM

## 2022-06-13 RX ORDER — CITALOPRAM 40 MG/1
40 TABLET ORAL DAILY
Qty: 90 TABLET | Refills: 3 | Status: SHIPPED | OUTPATIENT
Start: 2022-06-13 | End: 2023-08-09 | Stop reason: SDUPTHER

## 2022-08-23 ENCOUNTER — HOSPITAL ENCOUNTER (OUTPATIENT)
Dept: HEMATOLOGY ONCOLOGY | Facility: MEDICAL CENTER | Age: 27
End: 2022-08-23
Payer: COMMERCIAL

## 2022-08-23 VITALS — BODY MASS INDEX: 18.66 KG/M2 | WEIGHT: 112 LBS | HEIGHT: 65 IN

## 2022-08-23 DIAGNOSIS — M81.8 OTHER OSTEOPOROSIS WITHOUT CURRENT PATHOLOGICAL FRACTURE: ICD-10-CM

## 2022-08-23 DIAGNOSIS — Z09 ENCOUNTER FOR HEMATOLOGY FOLLOW-UP: ICD-10-CM

## 2022-08-23 DIAGNOSIS — D57.1 HB-SS DISEASE WITHOUT CRISIS (HCC): ICD-10-CM

## 2022-08-23 PROCEDURE — 99214 OFFICE O/P EST MOD 30 MIN: CPT | Mod: 95 | Performed by: NURSE PRACTITIONER

## 2022-08-23 PROCEDURE — 99211 OFF/OP EST MAY X REQ PHY/QHP: CPT | Performed by: NURSE PRACTITIONER

## 2022-08-23 ASSESSMENT — ENCOUNTER SYMPTOMS
WEIGHT LOSS: 0
COUGH: 0
INSOMNIA: 0
WHEEZING: 0
DIARRHEA: 0
CONSTIPATION: 0
DIZZINESS: 0
FEVER: 0
TINGLING: 0
MYALGIAS: 0
HEADACHES: 0
CHILLS: 0
SHORTNESS OF BREATH: 0
VOMITING: 0
PALPITATIONS: 0
NAUSEA: 0
BLOOD IN STOOL: 0

## 2022-08-23 ASSESSMENT — FIBROSIS 4 INDEX: FIB4 SCORE: 0.65

## 2022-08-23 NOTE — PROGRESS NOTES
Subjective     Alicia Tejada is a 27 y.o. female who presents with Other (Hb-SS disease without crisis-Morin/HTH/ 6 mo fv labs prior)            HPI  Ms. Tejada presents evaluation of sickle cell anemia without crisis.Visit is conducted as an on demand video visit using Zoom and patient is unaccompanied today.     Patient diagnosed in childhood and was followed closely by [facility] and moved to [place] at around 8 years of age. She has never required Hydrea therapy and manages symptoms very well with hydration and diet. She rarely requires transfusion, last completed in 3/2021. She has established with [facility], Dr. Mancia's office, but requires no intervention.     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 She is at her baseline and remains asymptomatic.      Review of Systems   Constitutional:  Negative for chills, fever, malaise/fatigue and weight loss.   Respiratory:  Negative for cough, shortness of breath and wheezing.    Cardiovascular:  Positive for leg swelling (bilateral ankles - heat and travel). Negative for chest pain and palpitations.   Gastrointestinal:  Negative for blood in stool, constipation (consistent with baseline; Last BM this am), diarrhea, melena, nausea and vomiting.   Genitourinary:  Negative for dysuria and hematuria.   Musculoskeletal:  Negative for joint pain and myalgias.   Neurological:  Negative for dizziness, tingling and headaches.   Psychiatric/Behavioral:  The patient does not have insomnia.          No Known Allergies          Current Outpatient Medications on File Prior to Encounter   Medication Sig Dispense Refill    citalopram (CELEXA) 40 MG Tab Take 1 Tablet by mouth every day. 90 Tablet 3    azelastine (OPTIVAR) 0.05 % ophthalmic solution Administer 1 Drop into both eyes 2 times a day. 6 mL 1    albuterol 108 (90 Base) MCG/ACT Aero Soln inhalation aerosol Inhale 2 Puffs every 6 hours as  "needed.      esomeprazole (NEXIUM) 20 MG capsule Take 1 Capsule by mouth every morning before breakfast. 90 Capsule 3    etonogestrel (NEXPLANON) 68 MG Implant implant Inject 1 Each under the skin one time.      buPROPion (WELLBUTRIN XL) 150 MG XL tablet Take 1 Tablet by mouth every morning. (Patient not taking: Reported on 8/23/2022) 30 Tablet 11     No current facility-administered medications on file prior to encounter.              Objective     Ht 1.651 m (5' 5\")   Wt 50.8 kg (112 lb) Comment: patient reported  BMI 18.64 kg/m²      Physical Exam  Constitutional:       General: She is not in acute distress.     Appearance: Normal appearance. She is not ill-appearing.   Pulmonary:      Effort: Pulmonary effort is normal.   Skin:     Coloration: Skin is not jaundiced or pale.   Neurological:      Mental Status: She is alert and oriented to person, place, and time. Mental status is at baseline.   Psychiatric:         Mood and Affect: Mood normal.       NO labs completed prior to this visi this visit        Assessment & Plan     1. 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 Encounter for hematology follow-up        3. Other osteoporosis without current pathological fracture            1.  Osteoporosis: She continues with calcium w/ vitamin D and WBE as per PCP.     2.  Sickle Cell: Stable with infrequent crises, last transfusion 3/2021. She manages symptoms very well with hydration and diet. Labs were not completed prior to this visit.    She will completed labs every 3 months and return for re-evaluation in 6 months, sooner as needed.    Should her counts and condition remain stable, will likely transition to every 6 months labs and annual surveillance visits at next visit.        The patient verbalized agreement and understanding of current plan. All questions and concerns were addressed at time of visit.    Please note that this dictation was created using voice recognition software. I have made every " reasonable attempt to correct obvious errors, but I expect that there are errors of grammar and possibly content that I did not discover before finalizing the note.        This evaluation was conducted via Zoom using secure and encrypted videoconferencing technology. The patient was in their home in the [facility].  The patient's identity was confirmed and verbal consent was obtained for this virtual visit.

## 2022-09-12 ENCOUNTER — HOSPITAL ENCOUNTER (OUTPATIENT)
Dept: LAB | Facility: MEDICAL CENTER | Age: 27
End: 2022-09-12
Attending: NURSE PRACTITIONER
Payer: COMMERCIAL

## 2022-09-12 DIAGNOSIS — D57.1 HB-SS DISEASE WITHOUT CRISIS (HCC): ICD-10-CM

## 2022-09-12 LAB
ALBUMIN SERPL BCP-MCNC: 4.7 G/DL (ref 3.2–4.9)
ALBUMIN/GLOB SERPL: 1.5 G/DL
ALP SERPL-CCNC: 47 U/L (ref 30–99)
ALT SERPL-CCNC: 11 U/L (ref 2–50)
ANION GAP SERPL CALC-SCNC: 14 MMOL/L (ref 7–16)
ANISOCYTOSIS BLD QL SMEAR: ABNORMAL
AST SERPL-CCNC: 63 U/L (ref 12–45)
BASOPHILS # BLD AUTO: 0.3 % (ref 0–1.8)
BASOPHILS # BLD: 0.04 K/UL (ref 0–0.12)
BILIRUB SERPL-MCNC: 4.2 MG/DL (ref 0.1–1.5)
BUN SERPL-MCNC: 7 MG/DL (ref 8–22)
CALCIUM SERPL-MCNC: 9.1 MG/DL (ref 8.5–10.5)
CHLORIDE SERPL-SCNC: 103 MMOL/L (ref 96–112)
CO2 SERPL-SCNC: 22 MMOL/L (ref 20–33)
COMMENT 1642: NORMAL
CREAT SERPL-MCNC: 0.35 MG/DL (ref 0.5–1.4)
EOSINOPHIL # BLD AUTO: 0.42 K/UL (ref 0–0.51)
EOSINOPHIL NFR BLD: 3.6 % (ref 0–6.9)
ERYTHROCYTE [DISTWIDTH] IN BLOOD BY AUTOMATED COUNT: 76.9 FL (ref 35.9–50)
GFR SERPLBLD CREATININE-BSD FMLA CKD-EPI: 143 ML/MIN/1.73 M 2
GLOBULIN SER CALC-MCNC: 3.1 G/DL (ref 1.9–3.5)
GLUCOSE SERPL-MCNC: 116 MG/DL (ref 65–99)
HCT VFR BLD AUTO: 20.8 % (ref 37–47)
HGB BLD-MCNC: 7.3 G/DL (ref 12–16)
HYPOCHROMIA BLD QL SMEAR: ABNORMAL
IMM GRANULOCYTES # BLD AUTO: 0.04 K/UL (ref 0–0.11)
IMM GRANULOCYTES NFR BLD AUTO: 0.3 % (ref 0–0.9)
LYMPHOCYTES # BLD AUTO: 3.32 K/UL (ref 1–4.8)
LYMPHOCYTES NFR BLD: 28.5 % (ref 22–41)
MACROCYTES BLD QL SMEAR: ABNORMAL
MCH RBC QN AUTO: 30.3 PG (ref 27–33)
MCHC RBC AUTO-ENTMCNC: 35.1 G/DL (ref 33.6–35)
MCV RBC AUTO: 86.3 FL (ref 81.4–97.8)
MICROCYTES BLD QL SMEAR: ABNORMAL
MONOCYTES # BLD AUTO: 0.52 K/UL (ref 0–0.85)
MONOCYTES NFR BLD AUTO: 4.5 % (ref 0–13.4)
MORPHOLOGY BLD-IMP: NORMAL
NEUTROPHILS # BLD AUTO: 7.3 K/UL (ref 2–7.15)
NEUTROPHILS NFR BLD: 62.8 % (ref 44–72)
NRBC # BLD AUTO: 0.11 K/UL
NRBC BLD-RTO: 0.9 /100 WBC
OVALOCYTES BLD QL SMEAR: NORMAL
PLATELET # BLD AUTO: 491 K/UL (ref 164–446)
PLATELET BLD QL SMEAR: NORMAL
PMV BLD AUTO: 11.3 FL (ref 9–12.9)
POIKILOCYTOSIS BLD QL SMEAR: NORMAL
POLYCHROMASIA BLD QL SMEAR: NORMAL
POTASSIUM SERPL-SCNC: 4.4 MMOL/L (ref 3.6–5.5)
PROT SERPL-MCNC: 7.8 G/DL (ref 6–8.2)
RBC # BLD AUTO: 2.41 M/UL (ref 4.2–5.4)
RBC BLD AUTO: PRESENT
SICKLE CELLS BLD QL SMEAR: NORMAL
SODIUM SERPL-SCNC: 139 MMOL/L (ref 135–145)
WBC # BLD AUTO: 11.6 K/UL (ref 4.8–10.8)

## 2022-09-12 PROCEDURE — 80053 COMPREHEN METABOLIC PANEL: CPT

## 2022-09-12 PROCEDURE — 36415 COLL VENOUS BLD VENIPUNCTURE: CPT

## 2022-09-12 PROCEDURE — 85025 COMPLETE CBC W/AUTO DIFF WBC: CPT

## 2022-10-06 ENCOUNTER — IMMUNIZATION (OUTPATIENT)
Dept: OCCUPATIONAL MEDICINE | Facility: CLINIC | Age: 27
End: 2022-10-06

## 2022-10-06 DIAGNOSIS — Z23 NEED FOR VACCINATION: Primary | ICD-10-CM

## 2022-10-06 PROCEDURE — 90686 IIV4 VACC NO PRSV 0.5 ML IM: CPT | Performed by: NURSE PRACTITIONER

## 2022-10-10 ENCOUNTER — OFFICE VISIT (OUTPATIENT)
Dept: MEDICAL GROUP | Facility: MEDICAL CENTER | Age: 27
End: 2022-10-10
Payer: COMMERCIAL

## 2022-10-10 VITALS
TEMPERATURE: 98.2 F | SYSTOLIC BLOOD PRESSURE: 100 MMHG | DIASTOLIC BLOOD PRESSURE: 60 MMHG | HEART RATE: 89 BPM | OXYGEN SATURATION: 95 % | BODY MASS INDEX: 19.63 KG/M2 | HEIGHT: 64 IN | WEIGHT: 115 LBS

## 2022-10-10 DIAGNOSIS — K58.1 IRRITABLE BOWEL SYNDROME WITH CONSTIPATION: ICD-10-CM

## 2022-10-10 PROBLEM — K59.09 OTHER CONSTIPATION: Status: ACTIVE | Noted: 2022-10-10

## 2022-10-10 PROCEDURE — 99214 OFFICE O/P EST MOD 30 MIN: CPT | Performed by: NURSE PRACTITIONER

## 2022-10-10 ASSESSMENT — FIBROSIS 4 INDEX: FIB4 SCORE: 1.04

## 2022-10-10 NOTE — PROGRESS NOTES
"Subjective:   Alicia Tejada is a 27 y.o. female here today for constipation    Other constipation  Ongoing for the past 3 weeks. No changes in the past 3 weeks in regard to stress, diet changes aside from eating less. She meal preps with vegetables, lean protein, rice or pasta.     Water intake 50 oz per day.   Caffeine: 12 oz coffee per day 3 days per week.   Alcohol: none    Having a BM once per week. Started taking fiber this past week, metamucil capsules. Hasn't tried anything else OTC.        Current medicines (including changes today)  Current Outpatient Medications   Medication Sig Dispense Refill    citalopram (CELEXA) 40 MG Tab Take 1 Tablet by mouth every day. 90 Tablet 3    azelastine (OPTIVAR) 0.05 % ophthalmic solution Administer 1 Drop into both eyes 2 times a day. 6 mL 1    albuterol 108 (90 Base) MCG/ACT Aero Soln inhalation aerosol Inhale 2 Puffs every 6 hours as needed.      esomeprazole (NEXIUM) 20 MG capsule Take 1 Capsule by mouth every morning before breakfast. 90 Capsule 3    etonogestrel (NEXPLANON) 68 MG Implant implant Inject 1 Each under the skin one time.      buPROPion (WELLBUTRIN XL) 150 MG XL tablet Take 1 Tablet by mouth every morning. (Patient not taking: Reported on 8/23/2022) 30 Tablet 11     No current facility-administered medications for this visit.     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    ROS   No chest pain, no shortness of breath, no abdominal pain  Positive ROS as per HPI.  All other systems reviewed and are negative.     Objective:     /60 (BP Location: Right arm, Patient Position: Sitting, BP Cuff Size: Adult)   Pulse 89   Temp 36.8 °C (98.2 °F) (Temporal)   Ht 1.626 m (5' 4\")   Wt 52.2 kg (115 lb)   SpO2 95%  Body mass index is 19.74 kg/m².     Physical " Exam:  Constitutional: Alert, no distress.  Skin: Warm, dry, good turgor, no rashes in visible areas.  Eye: Equal, round and reactive, conjunctiva clear, lids normal.  ENMT: Mask in place  Respiratory: Unlabored respiratory effort  Psych: Alert and oriented x3, normal affect and mood.      Assessment and Plan:   The following treatment plan was discussed    1. Irritable bowel syndrome with constipation  Unstable  Possibly stress related versus diet and routine change related  Advised increasing water intake  FODMAP diet info given, has been eating higher FODMAP foods  Advised MiraLAX 1 time daily as needed until she is having 1 formed stool per day, then use as needed      Followup: Return if symptoms worsen or fail to improve.    The MA is performing the above orders under the direction of Dr. Craft    Please note that this dictation was created using voice recognition software. I have made every reasonable attempt to correct obvious errors, but I expect that there are errors of grammar and possibly content that I did not discover before finalizing the note.

## 2022-10-10 NOTE — ASSESSMENT & PLAN NOTE
Ongoing for the past 3 weeks. No changes in the past 3 weeks in regard to stress, diet changes aside from eating less. She meal preps with vegetables, lean protein, rice or pasta.     Water intake 50 oz per day.   Caffeine: 12 oz coffee per day 3 days per week.   Alcohol: none    Having a BM once per week. Started taking fiber this past week, metamucil capsules. Hasn't tried anything else OTC.

## 2023-01-17 SDOH — HEALTH STABILITY: PHYSICAL HEALTH: ON AVERAGE, HOW MANY MINUTES DO YOU ENGAGE IN EXERCISE AT THIS LEVEL?: 60 MIN

## 2023-01-17 SDOH — ECONOMIC STABILITY: HOUSING INSECURITY
IN THE LAST 12 MONTHS, WAS THERE A TIME WHEN YOU DID NOT HAVE A STEADY PLACE TO SLEEP OR SLEPT IN A SHELTER (INCLUDING NOW)?: NO

## 2023-01-17 SDOH — ECONOMIC STABILITY: TRANSPORTATION INSECURITY
IN THE PAST 12 MONTHS, HAS LACK OF TRANSPORTATION KEPT YOU FROM MEETINGS, WORK, OR FROM GETTING THINGS NEEDED FOR DAILY LIVING?: NO

## 2023-01-17 SDOH — ECONOMIC STABILITY: TRANSPORTATION INSECURITY
IN THE PAST 12 MONTHS, HAS THE LACK OF TRANSPORTATION KEPT YOU FROM MEDICAL APPOINTMENTS OR FROM GETTING MEDICATIONS?: NO

## 2023-01-17 SDOH — ECONOMIC STABILITY: FOOD INSECURITY: WITHIN THE PAST 12 MONTHS, THE FOOD YOU BOUGHT JUST DIDN'T LAST AND YOU DIDN'T HAVE MONEY TO GET MORE.: NEVER TRUE

## 2023-01-17 SDOH — ECONOMIC STABILITY: INCOME INSECURITY: IN THE LAST 12 MONTHS, WAS THERE A TIME WHEN YOU WERE NOT ABLE TO PAY THE MORTGAGE OR RENT ON TIME?: NO

## 2023-01-17 SDOH — HEALTH STABILITY: PHYSICAL HEALTH: ON AVERAGE, HOW MANY DAYS PER WEEK DO YOU ENGAGE IN MODERATE TO STRENUOUS EXERCISE (LIKE A BRISK WALK)?: 3 DAYS

## 2023-01-17 SDOH — ECONOMIC STABILITY: HOUSING INSECURITY: IN THE LAST 12 MONTHS, HOW MANY PLACES HAVE YOU LIVED?: 1

## 2023-01-17 SDOH — ECONOMIC STABILITY: FOOD INSECURITY: WITHIN THE PAST 12 MONTHS, YOU WORRIED THAT YOUR FOOD WOULD RUN OUT BEFORE YOU GOT MONEY TO BUY MORE.: NEVER TRUE

## 2023-01-17 SDOH — ECONOMIC STABILITY: INCOME INSECURITY: HOW HARD IS IT FOR YOU TO PAY FOR THE VERY BASICS LIKE FOOD, HOUSING, MEDICAL CARE, AND HEATING?: NOT HARD AT ALL

## 2023-01-17 SDOH — ECONOMIC STABILITY: TRANSPORTATION INSECURITY
IN THE PAST 12 MONTHS, HAS LACK OF RELIABLE TRANSPORTATION KEPT YOU FROM MEDICAL APPOINTMENTS, MEETINGS, WORK OR FROM GETTING THINGS NEEDED FOR DAILY LIVING?: NO

## 2023-01-17 SDOH — HEALTH STABILITY: MENTAL HEALTH
STRESS IS WHEN SOMEONE FEELS TENSE, NERVOUS, ANXIOUS, OR CAN'T SLEEP AT NIGHT BECAUSE THEIR MIND IS TROUBLED. HOW STRESSED ARE YOU?: ONLY A LITTLE

## 2023-01-17 ASSESSMENT — SOCIAL DETERMINANTS OF HEALTH (SDOH)
HOW HARD IS IT FOR YOU TO PAY FOR THE VERY BASICS LIKE FOOD, HOUSING, MEDICAL CARE, AND HEATING?: NOT HARD AT ALL
HOW MANY DRINKS CONTAINING ALCOHOL DO YOU HAVE ON A TYPICAL DAY WHEN YOU ARE DRINKING: 1 OR 2
HOW OFTEN DO YOU GET TOGETHER WITH FRIENDS OR RELATIVES?: TWICE A WEEK
HOW OFTEN DO YOU ATTEND CHURCH OR RELIGIOUS SERVICES?: MORE THAN 4 TIMES PER YEAR
DO YOU BELONG TO ANY CLUBS OR ORGANIZATIONS SUCH AS CHURCH GROUPS UNIONS, FRATERNAL OR ATHLETIC GROUPS, OR SCHOOL GROUPS?: NO
HOW OFTEN DO YOU ATTEND CHURCH OR RELIGIOUS SERVICES?: MORE THAN 4 TIMES PER YEAR
HOW OFTEN DO YOU GET TOGETHER WITH FRIENDS OR RELATIVES?: TWICE A WEEK
DO YOU BELONG TO ANY CLUBS OR ORGANIZATIONS SUCH AS CHURCH GROUPS UNIONS, FRATERNAL OR ATHLETIC GROUPS, OR SCHOOL GROUPS?: NO
IN A TYPICAL WEEK, HOW MANY TIMES DO YOU TALK ON THE PHONE WITH FAMILY, FRIENDS, OR NEIGHBORS?: MORE THAN THREE TIMES A WEEK
ARE YOU MARRIED, WIDOWED, DIVORCED, SEPARATED, NEVER MARRIED, OR LIVING WITH A PARTNER?: NEVER MARRIED
IN A TYPICAL WEEK, HOW MANY TIMES DO YOU TALK ON THE PHONE WITH FAMILY, FRIENDS, OR NEIGHBORS?: MORE THAN THREE TIMES A WEEK
ARE YOU MARRIED, WIDOWED, DIVORCED, SEPARATED, NEVER MARRIED, OR LIVING WITH A PARTNER?: NEVER MARRIED
WITHIN THE PAST 12 MONTHS, YOU WORRIED THAT YOUR FOOD WOULD RUN OUT BEFORE YOU GOT THE MONEY TO BUY MORE: NEVER TRUE
HOW OFTEN DO YOU HAVE SIX OR MORE DRINKS ON ONE OCCASION: LESS THAN MONTHLY
HOW OFTEN DO YOU HAVE A DRINK CONTAINING ALCOHOL: MONTHLY OR LESS

## 2023-01-17 ASSESSMENT — LIFESTYLE VARIABLES
AUDIT-C TOTAL SCORE: 2
SKIP TO QUESTIONS 9-10: 0
HOW OFTEN DO YOU HAVE A DRINK CONTAINING ALCOHOL: MONTHLY OR LESS
HOW MANY STANDARD DRINKS CONTAINING ALCOHOL DO YOU HAVE ON A TYPICAL DAY: 1 OR 2
HOW OFTEN DO YOU HAVE SIX OR MORE DRINKS ON ONE OCCASION: LESS THAN MONTHLY

## 2023-01-19 ENCOUNTER — HOSPITAL ENCOUNTER (OUTPATIENT)
Dept: LAB | Facility: MEDICAL CENTER | Age: 28
End: 2023-01-19
Attending: NURSE PRACTITIONER
Payer: COMMERCIAL

## 2023-01-19 ENCOUNTER — OFFICE VISIT (OUTPATIENT)
Dept: MEDICAL GROUP | Facility: MEDICAL CENTER | Age: 28
End: 2023-01-19
Payer: COMMERCIAL

## 2023-01-19 VITALS
WEIGHT: 119 LBS | RESPIRATION RATE: 16 BRPM | DIASTOLIC BLOOD PRESSURE: 70 MMHG | BODY MASS INDEX: 20.32 KG/M2 | OXYGEN SATURATION: 91 % | HEART RATE: 98 BPM | SYSTOLIC BLOOD PRESSURE: 100 MMHG | HEIGHT: 64 IN | TEMPERATURE: 98.2 F

## 2023-01-19 DIAGNOSIS — J43.9 MIXED RESTRICTIVE AND OBSTRUCTIVE LUNG DISEASE (HCC): ICD-10-CM

## 2023-01-19 DIAGNOSIS — D57.1 HB-SS DISEASE WITHOUT CRISIS (HCC): ICD-10-CM

## 2023-01-19 DIAGNOSIS — E55.9 VITAMIN D DEFICIENCY: ICD-10-CM

## 2023-01-19 DIAGNOSIS — J98.4 MIXED RESTRICTIVE AND OBSTRUCTIVE LUNG DISEASE (HCC): ICD-10-CM

## 2023-01-19 DIAGNOSIS — Q89.01 FUNCTIONAL ASPLENIA: ICD-10-CM

## 2023-01-19 DIAGNOSIS — D64.9 ANEMIA, UNSPECIFIED TYPE: ICD-10-CM

## 2023-01-19 DIAGNOSIS — Z23 NEED FOR VACCINATION: ICD-10-CM

## 2023-01-19 DIAGNOSIS — Z00.00 ANNUAL PHYSICAL EXAM: ICD-10-CM

## 2023-01-19 DIAGNOSIS — R01.1 HEART MURMUR: ICD-10-CM

## 2023-01-19 DIAGNOSIS — M81.8 PREMATURE OSTEOPOROSIS: ICD-10-CM

## 2023-01-19 DIAGNOSIS — F33.0 MILD EPISODE OF RECURRENT MAJOR DEPRESSIVE DISORDER (HCC): ICD-10-CM

## 2023-01-19 DIAGNOSIS — M85.9 LOW BONE DENSITY FOR AGE: ICD-10-CM

## 2023-01-19 LAB
25(OH)D3 SERPL-MCNC: 37 NG/ML (ref 30–100)
ALBUMIN SERPL BCP-MCNC: 4.7 G/DL (ref 3.2–4.9)
ALBUMIN/GLOB SERPL: 1.4 G/DL
ALP SERPL-CCNC: 57 U/L (ref 30–99)
ALT SERPL-CCNC: 13 U/L (ref 2–50)
ANION GAP SERPL CALC-SCNC: 10 MMOL/L (ref 7–16)
ANISOCYTOSIS BLD QL SMEAR: ABNORMAL
AST SERPL-CCNC: 47 U/L (ref 12–45)
BASOPHILS # BLD AUTO: 1 % (ref 0–1.8)
BASOPHILS # BLD: 0.17 K/UL (ref 0–0.12)
BILIRUB SERPL-MCNC: 3.3 MG/DL (ref 0.1–1.5)
BUN SERPL-MCNC: 5 MG/DL (ref 8–22)
CALCIUM ALBUM COR SERPL-MCNC: 9.1 MG/DL (ref 8.5–10.5)
CALCIUM SERPL-MCNC: 9.7 MG/DL (ref 8.4–10.2)
CHLORIDE SERPL-SCNC: 101 MMOL/L (ref 96–112)
CO2 SERPL-SCNC: 26 MMOL/L (ref 20–33)
CREAT SERPL-MCNC: 0.33 MG/DL (ref 0.5–1.4)
EOSINOPHIL # BLD AUTO: 0.83 K/UL (ref 0–0.51)
EOSINOPHIL NFR BLD: 5 % (ref 0–6.9)
ERYTHROCYTE [DISTWIDTH] IN BLOOD BY AUTOMATED COUNT: 82.9 FL (ref 35.9–50)
GFR SERPLBLD CREATININE-BSD FMLA CKD-EPI: 145 ML/MIN/1.73 M 2
GIANT PLATELETS BLD QL SMEAR: NORMAL
GLOBULIN SER CALC-MCNC: 3.3 G/DL (ref 1.9–3.5)
GLUCOSE SERPL-MCNC: 98 MG/DL (ref 65–99)
HCT VFR BLD AUTO: 20.7 % (ref 37–47)
HGB BLD-MCNC: 7.3 G/DL (ref 12–16)
HIV 1+2 AB+HIV1 P24 AG SERPL QL IA: NORMAL
HYPOCHROMIA BLD QL SMEAR: ABNORMAL
LYMPHOCYTES # BLD AUTO: 5.94 K/UL (ref 1–4.8)
LYMPHOCYTES NFR BLD: 36 % (ref 22–41)
MANUAL DIFF BLD: NORMAL
MCH RBC QN AUTO: 30.7 PG (ref 27–33)
MCHC RBC AUTO-ENTMCNC: 35.3 G/DL (ref 33.6–35)
MCV RBC AUTO: 87 FL (ref 81.4–97.8)
MICROCYTES BLD QL SMEAR: ABNORMAL
MONOCYTES # BLD AUTO: 0.83 K/UL (ref 0–0.85)
MONOCYTES NFR BLD AUTO: 5 % (ref 0–13.4)
NEUTROPHILS # BLD AUTO: 8.75 K/UL (ref 2–7.15)
NEUTROPHILS NFR BLD: 53 % (ref 44–72)
NRBC # BLD AUTO: 0.24 K/UL
NRBC BLD-RTO: 1.5 /100 WBC
OVALOCYTES BLD QL SMEAR: NORMAL
PLATELET # BLD AUTO: 503 K/UL (ref 164–446)
PLATELET BLD QL SMEAR: NORMAL
PMV BLD AUTO: 10 FL (ref 9–12.9)
POIKILOCYTOSIS BLD QL SMEAR: NORMAL
POLYCHROMASIA BLD QL SMEAR: NORMAL
POTASSIUM SERPL-SCNC: 4.4 MMOL/L (ref 3.6–5.5)
PROT SERPL-MCNC: 8 G/DL (ref 6–8.2)
RBC # BLD AUTO: 2.38 M/UL (ref 4.2–5.4)
RBC BLD AUTO: PRESENT
SCHISTOCYTES BLD QL SMEAR: NORMAL
SICKLE CELLS BLD QL SMEAR: NORMAL
SODIUM SERPL-SCNC: 137 MMOL/L (ref 135–145)
SPHEROCYTES BLD QL SMEAR: NORMAL
STOMATOCYTES BLD QL SMEAR: NORMAL
T PALLIDUM AB SER QL IA: NORMAL
TSH SERPL DL<=0.005 MIU/L-ACNC: 1.96 UIU/ML (ref 0.38–5.33)
VARIANT LYMPHS BLD QL SMEAR: NORMAL
WBC # BLD AUTO: 16.5 K/UL (ref 4.8–10.8)

## 2023-01-19 PROCEDURE — 85025 COMPLETE CBC W/AUTO DIFF WBC: CPT

## 2023-01-19 PROCEDURE — 87389 HIV-1 AG W/HIV-1&-2 AB AG IA: CPT

## 2023-01-19 PROCEDURE — 90471 IMMUNIZATION ADMIN: CPT | Performed by: NURSE PRACTITIONER

## 2023-01-19 PROCEDURE — 84443 ASSAY THYROID STIM HORMONE: CPT

## 2023-01-19 PROCEDURE — 87491 CHLMYD TRACH DNA AMP PROBE: CPT

## 2023-01-19 PROCEDURE — 36415 COLL VENOUS BLD VENIPUNCTURE: CPT

## 2023-01-19 PROCEDURE — 86780 TREPONEMA PALLIDUM: CPT

## 2023-01-19 PROCEDURE — 87591 N.GONORRHOEAE DNA AMP PROB: CPT

## 2023-01-19 PROCEDURE — 80053 COMPREHEN METABOLIC PANEL: CPT

## 2023-01-19 PROCEDURE — 85007 BL SMEAR W/DIFF WBC COUNT: CPT

## 2023-01-19 PROCEDURE — 99395 PREV VISIT EST AGE 18-39: CPT | Mod: 25 | Performed by: NURSE PRACTITIONER

## 2023-01-19 PROCEDURE — 90621 MENB-FHBP VACC 2/3 DOSE IM: CPT | Performed by: NURSE PRACTITIONER

## 2023-01-19 PROCEDURE — 82306 VITAMIN D 25 HYDROXY: CPT

## 2023-01-19 ASSESSMENT — FIBROSIS 4 INDEX: FIB4 SCORE: 1.04

## 2023-01-19 NOTE — ASSESSMENT & PLAN NOTE
Social/Family: Singe, no children  Work: RN at [employer]  Diet: Well balanced on work days, not balanced on off days  Caffeine/Energy Drinks/Soda: Coffee daily, no ED, some gingerale  Exercise: Not currently  Stress: Normal life stree  Sleep:Night shift, no issues  Depression/Anxiety Concerns: Controlled on medications

## 2023-01-20 PROBLEM — J06.9 VIRAL UPPER RESPIRATORY TRACT INFECTION: Status: RESOLVED | Noted: 2022-01-03 | Resolved: 2023-01-20

## 2023-01-20 LAB
C TRACH DNA SPEC QL NAA+PROBE: NEGATIVE
N GONORRHOEA DNA SPEC QL NAA+PROBE: NEGATIVE
SPECIMEN SOURCE: NORMAL

## 2023-01-20 NOTE — ASSESSMENT & PLAN NOTE
Chronic.  DEXA 10/2021 showed osteoporotic in the lumbar spine, osteopenic in the right femur. Last DEXA was when she was a child. Had not been taking Vitamin D or Calcium supplementation. Now taking Calcium 1200 mg and Vit D3 5000 IU, however she does forget to take these occasionally.    Was previously on Fosamax, does not recall for how long, possibly 2 years.    She is doing some weight bearing exercise, does not use tobacco, does not drink heavily.     Does take Nexium occasionally due to GERD. History of vertebral fracture as a child no fracture since then.    Referred to endocrinology previously, has not scheduled with them, referral has .

## 2023-01-20 NOTE — ASSESSMENT & PLAN NOTE
Stable. Using albuterol as needed. No longer requiring daily maintenance inhaler.  No longer established with pulmonology.

## 2023-01-20 NOTE — PROGRESS NOTES
Subjective:   Alicia Tejada is a 27 y.o. female here today for annual, lab request    Annual physical exam  Social/Family: Singe, no children  Work: RN at [employer]  Diet: Well balanced on work days, not balanced on off days  Caffeine/Energy Drinks/Soda: Coffee daily, no ED, some gingerale  Exercise: Not currently  Stress: Normal life stree  Sleep:Night shift, no issues  Depression/Anxiety Concerns: Controlled on medications      Vitamin D deficiency  Chronic.  Patient continues to take a supplement most days.  Due for labs    Premature osteoporosis  Chronic.  DEXA 10/2021 showed osteoporotic in the lumbar spine, osteopenic in the right femur. Last DEXA was when she was a child. Had not been taking Vitamin D or Calcium supplementation. Now taking Calcium 1200 mg and Vit D3 5000 IU, however she does forget to take these occasionally.    Was previously on Fosamax, does not recall for how long, possibly 2 years.    She is doing some weight bearing exercise, does not use tobacco, does not drink heavily.     Does take Nexium occasionally due to GERD. History of vertebral fracture as a child no fracture since then.    Referred to endocrinology previously, has not scheduled with them, referral has .    Mixed restrictive and obstructive lung disease (HCC)  Stable. Using albuterol as needed. No longer requiring daily maintenance inhaler.  No longer established with pulmonology.    Mild episode of recurrent major depressive disorder (HCC)  Chronic. Stable on citalopram 40 mg daily.     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 Parents both SS cariers. Has only had one crisis in her life after moving to [place] due to the altitude change. Followed by hematology, has follow up appointment next month. Hemoglobin stable at 7.3. Did get transfused in 2021 with hemoglobin of 6.1.     She is feeling well.     Heart murmur  Chronic.  Previously seen by [facility]  "[place].  Has not had a repeat echo in many years.  Records indicate that they wanted to repeat echo in 2018, however she moved to [place] at that time.    Denies chest pain, shortness of breath, dizziness, palpitations       Current medicines (including changes today)  Current Outpatient Medications   Medication Sig Dispense Refill    citalopram (CELEXA) 40 MG Tab Take 1 Tablet by mouth every day. 90 Tablet 3    azelastine (OPTIVAR) 0.05 % ophthalmic solution Administer 1 Drop into both eyes 2 times a day. 6 mL 1    albuterol 108 (90 Base) MCG/ACT Aero Soln inhalation aerosol Inhale 2 Puffs every 6 hours as needed.      esomeprazole (NEXIUM) 20 MG capsule Take 1 Capsule by mouth every morning before breakfast. 90 Capsule 3    etonogestrel (NEXPLANON) 68 MG Implant implant Inject 1 Each under the skin one time.       No current facility-administered medications for this visit.     She  has a past medical history of Anemia, Anxiety, Asthma, Mixed restrictive and obstructive lung disease (HCC) (8/5/2010), Nausea, vomiting, and diarrhea (3/12/2021), Premature osteoporosis, Sickle cell anemia ([facility]), and Snoring.    ROS   No chest pain, no shortness of breath, no abdominal pain  Positive ROS as per HPI.  All other systems reviewed and are negative.     Objective:     /70   Pulse 98   Temp 36.8 °C (98.2 °F) (Temporal)   Resp 16   Ht 1.626 m (5' 4\")   Wt 54 kg (119 lb)   SpO2 91%  Body mass index is 20.43 kg/m².     Physical Exam:  Constitutional: Alert, no distress.  Skin: Warm, dry, good turgor, no rashes in visible areas.  Eye: Equal, round and reactive, conjunctiva clear, lids normal.  ENMT: Lips without lesions, good dentition, oropharynx clear.  Neck: Trachea midline, no masses, no thyromegaly. No cervical or supraclavicular lymphadenopathy  Respiratory: Unlabored respiratory effort, lungs clear to auscultation, no wheezes, no ronchi.  Cardiovascular: Normal S1, S2, no murmur, no edema.  Abdomen: Soft, " non-tender, no masses, no hepatosplenomegaly.  Psych: Alert and oriented x3, normal affect and mood.        Assessment and Plan:   The following treatment plan was discussed    1. Annual physical exam  Annual labs and health maintenance reviewed and updated.   Patient and I discussed the importance of lifestyle changes, with particular emphasis on decreasing sugar and carbohydrate intake and increasing plant-based nutrition (for the purposes of weight loss, general health, and prevention of chronic illnesses), as well as regular cardiovascular exercise, proper sleep, and stress management. Patient verbalized understanding.  - CBC WITH DIFFERENTIAL; Future  - Comp Metabolic Panel; Future  - TSH WITH REFLEX TO FT4; Future  - VITAMIN D,25 HYDROXY (DEFICIENCY); Future  - HIV AG/AB COMBO ASSAY SCREENING; Future  - Chlamydia/GC, PCR (Urine); Future    2. 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 Anemia, unspecified type  Stable, due for repeat labs  - CBC WITH DIFFERENTIAL; Future    4. Mixed restrictive and obstructive lung disease (HCC)  Stable on as needed albuterol  - EC-ECHOCARDIOGRAM COMPLETE W/O CONT; Future    5. Mild episode of recurrent major depressive disorder (HCC)  Stable on citalopram 40 mg daily    6. Heart murmur  Stable, asymptomatic  Plan for repeat echo for monitoring purposes  - EC-ECHOCARDIOGRAM COMPLETE W/O CONT; Future    7. Premature osteoporosis  Unstable  Due for repeat bone density in October, ordered today  New referral placed for endocrinology to establish care and continue management, likely needs to restart bisphosphonate  Continue vitamin D and calcium supplement daily and regular weightbearing exercise  - DS-BONE DENSITY STUDY (DEXA); Future  - Referral to Endocrinology    8. Low bone density for age  - Referral to Endocrinology    9. Vitamin D deficiency  - VITAMIN D,25 HYDROXY (DEFICIENCY);  Future    10. Functional asplenia  - Meningococcal (IM) Group B    11. Need for vaccination  - Meningococcal (IM) Group B      Followup: Return in about 1 year (around 1/19/2024).    The MA is performing the above orders under the direction of Dr. Craft    Please note that this dictation was created using voice recognition software. I have made every reasonable attempt to correct obvious errors, but I expect that there are errors of grammar and possibly content that I did not discover before finalizing the note.

## 2023-01-20 NOTE — ASSESSMENT & PLAN NOTE
Chronic.  Previously seen by pediatric cardiology at [facility] in [place].  Has not had a repeat echo in many years.  Records indicate that they wanted to repeat echo in 2018, however she moved to [place] at that time.    Denies chest pain, shortness of breath, dizziness, palpitations

## 2023-01-20 NOTE — ASSESSMENT & PLAN NOTE
Chronic. Parents both SS cariers. Has only had one crisis in her life after moving to [place] due to the altitude change. Followed by hematology, has follow up appointment next month. Hemoglobin stable at 7.3. Did get transfused in March 2021 with hemoglobin of 6.1.     She is feeling well.

## 2023-01-30 ENCOUNTER — APPOINTMENT (OUTPATIENT)
Dept: CARDIOLOGY | Facility: MEDICAL CENTER | Age: 28
End: 2023-01-30
Attending: NURSE PRACTITIONER
Payer: COMMERCIAL

## 2023-02-28 ENCOUNTER — HOSPITAL ENCOUNTER (OUTPATIENT)
Dept: HEMATOLOGY ONCOLOGY | Facility: MEDICAL CENTER | Age: 28
End: 2023-02-28
Attending: NURSE PRACTITIONER
Payer: COMMERCIAL

## 2023-02-28 VITALS
TEMPERATURE: 99.4 F | HEART RATE: 87 BPM | OXYGEN SATURATION: 91 % | BODY MASS INDEX: 19.29 KG/M2 | HEIGHT: 64 IN | SYSTOLIC BLOOD PRESSURE: 102 MMHG | WEIGHT: 113 LBS | DIASTOLIC BLOOD PRESSURE: 58 MMHG

## 2023-02-28 DIAGNOSIS — Z09 ENCOUNTER FOR HEMATOLOGY FOLLOW-UP: ICD-10-CM

## 2023-02-28 DIAGNOSIS — R17 TOTAL BILIRUBIN, ELEVATED: ICD-10-CM

## 2023-02-28 DIAGNOSIS — M81.8 PREMATURE OSTEOPOROSIS: ICD-10-CM

## 2023-02-28 DIAGNOSIS — D57.1 HB-SS DISEASE WITHOUT CRISIS (HCC): ICD-10-CM

## 2023-02-28 DIAGNOSIS — R79.89 ABNORMAL BILIRUBIN TEST: ICD-10-CM

## 2023-02-28 DIAGNOSIS — Q89.01 FUNCTIONAL ASPLENIA: ICD-10-CM

## 2023-02-28 PROCEDURE — 99212 OFFICE O/P EST SF 10 MIN: CPT | Performed by: NURSE PRACTITIONER

## 2023-02-28 PROCEDURE — 99214 OFFICE O/P EST MOD 30 MIN: CPT | Performed by: NURSE PRACTITIONER

## 2023-02-28 RX ORDER — CETIRIZINE HYDROCHLORIDE 10 MG/1
10 TABLET ORAL DAILY
COMMUNITY

## 2023-02-28 ASSESSMENT — ENCOUNTER SYMPTOMS
CHILLS: 0
FEVER: 0
HEADACHES: 1
NAUSEA: 0
SHORTNESS OF BREATH: 0
INSOMNIA: 0
COUGH: 0
DIZZINESS: 0
CONSTIPATION: 0
PALPITATIONS: 0
VOMITING: 0
TINGLING: 0
WHEEZING: 0
MYALGIAS: 0
WEIGHT LOSS: 0
BLOOD IN STOOL: 0
DIARRHEA: 0
ABDOMINAL PAIN: 0

## 2023-02-28 ASSESSMENT — FIBROSIS 4 INDEX: FIB4 SCORE: 0.7

## 2023-02-28 ASSESSMENT — PATIENT HEALTH QUESTIONNAIRE - PHQ9: CLINICAL INTERPRETATION OF PHQ2 SCORE: 0

## 2023-02-28 NOTE — PROGRESS NOTES
Subjective     Alicia Tejada is a 27 y.o. female who presents with Follow-Up (Moirn/HTH/Sickle Cell/6 mo fv labs prior)            HPI  Ms. Tejada presents evaluation of sickle cell anemia without crisis. She is unaccompanied for today's visit.     Patient diagnosed in childhood and was followed closely by [facility] and moved to [place] at around 8 years of age. She has never required Hydrea therapy and manages symptoms very well with hydration and diet. She rarely requires transfusion, last completed in 3/2021. She has established with [facility] Dr. Gavino Carrillo's office, but requires no intervention per their office.    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 She reports COVID infection at the end of January of this year with no long-term issues noted, mild increased mucus production that is slowly resolving. She continues with good hydration and diet and reports no sickle cell crises. She is essentially at her baseline and asymptomatic.       Review of Systems   Constitutional:  Negative for chills, fever, malaise/fatigue (usual activity without issue - night shift) and weight loss.   Respiratory:  Negative for cough, shortness of breath and wheezing.         COVID at end of January - no long term issues, some increased mucous production   Cardiovascular:  Negative for chest pain, palpitations and leg swelling.   Gastrointestinal:  Negative for abdominal pain, blood in stool, constipation (Last BM this am), diarrhea, melena, nausea and vomiting.   Genitourinary:  Negative for dysuria and hematuria.   Musculoskeletal:  Negative for joint pain and myalgias.   Neurological:  Positive for headaches (intermittent, relieved wtih Motrin or Tylenol). Negative for dizziness and tingling.   Psychiatric/Behavioral:  The patient does not have insomnia.        No Known Allergies      Current Outpatient Medications on File Prior to Encounter   Medication Sig  "Dispense Refill    cetirizine (ZYRTEC) 10 MG Tab Take 10 mg by mouth every day.      citalopram (CELEXA) 40 MG Tab Take 1 Tablet by mouth every day. 90 Tablet 3    azelastine (OPTIVAR) 0.05 % ophthalmic solution Administer 1 Drop into both eyes 2 times a day. 6 mL 1    albuterol 108 (90 Base) MCG/ACT Aero Soln inhalation aerosol Inhale 2 Puffs every 6 hours as needed.      esomeprazole (NEXIUM) 20 MG capsule Take 1 Capsule by mouth every morning before breakfast. 90 Capsule 3    etonogestrel (NEXPLANON) 68 MG Implant implant Inject 1 Each under the skin one time.       No current facility-administered medications on file prior to encounter.              Objective     /58 (BP Location: Right arm, Patient Position: Sitting, BP Cuff Size: Adult)   Pulse 87   Temp 37.4 °C (99.4 °F) (Temporal)   Ht 1.626 m (5' 4\")   Wt 51.3 kg (113 lb)   SpO2 91%   BMI 19.40 kg/m²      Physical Exam  Vitals reviewed.   Constitutional:       General: She is not in acute distress.     Appearance: She is well-developed. She is not diaphoretic.   HENT:      Head: Normocephalic and atraumatic.      Mouth/Throat:      Pharynx: No oropharyngeal exudate.   Eyes:      General: Scleral icterus (subtle) present.         Right eye: No discharge.         Left eye: No discharge.      Conjunctiva/sclera: Conjunctivae normal.      Pupils: Pupils are equal, round, and reactive to light.   Cardiovascular:      Rate and Rhythm: Normal rate and regular rhythm.      Heart sounds: Murmur heard.     No friction rub. No gallop.   Pulmonary:      Effort: Pulmonary effort is normal. No respiratory distress.      Breath sounds: Normal breath sounds. No wheezing.   Abdominal:      General: Bowel sounds are normal. There is no distension.      Palpations: Abdomen is soft. There is no hepatomegaly, splenomegaly (asplenic) or mass.      Tenderness: There is no abdominal tenderness.      Hernia: No hernia is present.   Musculoskeletal:         General: " Normal range of motion.      Cervical back: Normal range of motion and neck supple.   Skin:     General: Skin is warm and dry.      Coloration: Skin is not pale.      Findings: No erythema or rash.   Neurological:      Mental Status: She is alert and oriented to person, place, and time.   Psychiatric:         Mood and Affect: Mood normal.         Behavior: Behavior normal.          Latest Reference Range & Units 03/12/21 04:50 03/12/21 07:30 03/12/21 18:20 02/17/22 12:24 09/12/22 13:24 01/19/23 15:08 03/05/23 21:23   WBC 4.8 - 10.8 K/uL 9.6 8.8  13.7 (H) 11.6 (H) 16.5 (H) 18.3 (H)   RBC 4.20 - 5.40 M/uL 1.90 (L) 1.98 (L)  2.41 (L) 2.41 (L) 2.38 (L) 2.57 (L)   Hemoglobin 12.0 - 16.0 g/dL 5.6 (LL) 6.1 (L) 7.7 (L) 7.0 (L) 7.3 (L) 7.3 (L) 7.8 (L)   Hematocrit 37.0 - 47.0 % 16.5 (LL) 17.1 (LL)  20.3 (L) 20.8 (L) 20.7 (L) 22.3 (L)   MCV 81.4 - 97.8 fL 86.8 86.4  84.2 86.3 87.0 86.8   MCH 27.0 - 33.0 pg 29.5 30.8  29.0 30.3 30.7 30.4   MCHC 33.6 - 35.0 g/dL 33.9 35.7 (H)  34.5 35.1 (H) 35.3 (H) 35.0   RDW 35.9 - 50.0 fL 68.2 (H) 66.0 (H)  75.0 (H) 76.9 (H) 82.9 (H) 80.0 (H)   Platelet Count 164 - 446 K/uL 372 390  589 (H) 491 (H) 503 (H) 554 (H)   MPV 9.0 - 12.9 fL 10.8 10.1  9.1 11.3 10.0 10.1   Neutrophils-Polys 44.00 - 72.00 % 61.90 64.60  60.90 62.80 53.00 60.50   Neutrophils (Absolute) 2.00 - 7.15 K/uL 5.94 5.76  8.31 (H) 7.30 (H) 8.75 (H) 11.07 (H)   Bands-Stabs 0.00 - 10.00 %  0.90        Lymphocytes 22.00 - 41.00 % 32.20 25.70  30.60 28.50 36.00 33.30   Lymphs (Absolute) 1.00 - 4.80 K/uL 3.09 2.26  4.18 3.32 5.94 (H) 6.09 (H)   Monocytes 0.00 - 13.40 % 2.50 1.80  5.60 4.50 5.00 3.50   Monos (Absolute) 0.00 - 0.85 K/uL 0.24 0.16  0.77 0.52 0.83 0.64   Eosinophils 0.00 - 6.90 % 3.40 5.30  1.80 3.60 5.00 1.80   Eos (Absolute) 0.00 - 0.51 K/uL 0.33 0.47  0.25 0.42 0.83 (H) 0.33   Basophils 0.00 - 1.80 % 0.00 0.00  0.60 0.30 1.00 0.90   Baso (Absolute) 0.00 - 0.12 K/uL 0.00 0.00  0.08 0.04 0.17 (H) 0.16 (H)   Myelocytes  %  1.80        Immature Granulocytes 0.00 - 0.90 %    0.50 0.30     Immature Granulocytes (abs) 0.00 - 0.11 K/uL    0.07 0.04     Nucleated RBC /100 WBC 1.20 1.50  0.50 0.90 1.50 0.90   NRBC (Absolute) K/uL 0.12 0.13  0.07 0.11 0.24 0.16   Plt Estimation  Normal Normal   Increased Increased Increased   Giant Platelets       1+    RBC Morphology  Present    Present Present Present   Hypochromia  1+    1+ 1+    Anisocytosis  2+ !    2+ ! 2+ ! 1+   Macrocytosis  1+    2+ !  1+   Microcytosis      1+ 2+ ! 1+   Polychromia  1+    1+ 1+ 1+   Poikilocytosis  2+    1+ 2+ 2+   Ovalocytes  2+    1+ 2+ 1+   Schistocytes       1+    Spherocytes       1+    Stomatocytes       1+    Sickle Cells      2+ 2+ 2+   Reactive Lymphocytes       Few    Comments-Diff      see below     Peripheral Smear Review  see below see below   see below  see below   Manual Diff Status  PERFORMED PERFORMED    PERFORMED PERFORMED   (LL): Data is critically low  (H): Data is abnormally high  (L): Data is abnormally low  !: Data is abnormal         Latest Reference Range & Units 03/12/21 04:50 03/12/21 07:30 03/12/21 14:40 02/17/22 12:24 09/12/22 13:24 01/19/23 15:08 03/05/23 21:23   Sodium 135 - 145 mmol/L 136   136 139 137 134 (L)   Potassium 3.6 - 5.5 mmol/L 2.9 (L)  3.7 3.8 4.4 4.4 4.1   Chloride 96 - 112 mmol/L 102   102 103 101 100   Co2 20 - 33 mmol/L 25   22 22 26 20   Anion Gap 7.0 - 16.0  9.0   12.0 14.0 10.0 14.0   Glucose 65 - 99 mg/dL 101 (H)   89 116 (H) 98 116 (H)   Bun 8 - 22 mg/dL 4 (L)   5 (L) 7 (L) 5 (L) 8   Creatinine 0.50 - 1.40 mg/dL 0.29 (L)   0.33 (L) 0.35 (L) 0.33 (L) 0.32 (L)   GFR (CKD-EPI) >60 mL/min/1.73 m 2     143 145 146   GFR If African American >60 mL/min/1.73 m 2 >60   >60      GFR If Non African American >60 mL/min/1.73 m 2 >60   >60      Calcium 8.5 - 10.5 mg/dL 8.2 (L)   9.2 9.1 9.7 9.5   Correct Calcium 8.5 - 10.5 mg/dL      9.1 8.7   AST(SGOT) 12 - 45 U/L 37   45 63 (H) 47 (H) 61 (H)   ALT(SGPT) 2 - 50 U/L 26   10  11 13 16   Alkaline Phosphatase 30 - 99 U/L 40   46 47 57 58   Total Bilirubin 0.1 - 1.5 mg/dL 1.7 (H)   3.1 (H) 4.2 (H) 3.3 (H) 4.3 (H)   Albumin 3.2 - 4.9 g/dL 3.2   4.6 4.7 4.7 5.0 (H)   Total Protein 6.0 - 8.2 g/dL 6.0   7.5 7.8 8.0 8.9 (H)   Globulin 1.9 - 3.5 g/dL 2.8   2.9 3.1 3.3 3.9 (H)   A-G Ratio g/dL 1.1   1.6 1.5 1.4 1.3   Magnesium 1.5 - 2.5 mg/dL  2.0        Troponin T 6 - 19 ng/L       <6   (L): Data is abnormally low  (H): Data is abnormally high                     Latest Reference Range & Units 03/11/21 12:50   Reticulocyte Count 0.8 - 2.1 % 17.4 (H)   Retic, Absolute 0.04 - 0.06 M/uL 0.39 (H)   Imm. Reticulocyte Fraction 9.3 - 17.4 % 19.4 (H)   Retic Hgb Equivalent 29.0 - 35.0 pg/cell 30.4   (H): Data is abnormally high                   Assessment & Plan     1. 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 Encounter for hematology follow-up        3. Functional asplenia        4. Premature osteoporosis        5. Total bilirubin, elevated        6. Abnormal bilirubin test  RETICULOCYTES COUNT    BILIRUBIN DIRECT    BILIRUBIN INDIRECT          1.  Osteoporosis: She continues with calcium w/ vitamin D and WBE as per PCP. Next DEXA due 10/2023 as per PCP.     2.  Elevated WBC: Slowly up trending, she continues without fever, chills, or recurrent infections; remote h/o splenectomy. Continue to monitor at this time.    3.  Elevated bilirubin: Slowly up trending since last transfusion (3/2021), review of records indicates norm/baseline is ~3. Results discussed with Dr. Landa, will obtain further evaluation: direct, indirect bili, retic count, Hgb electrophoresis.    4.  Sickle Cell: Stable with infrequent crises, last transfusion 3/2021.    CBC and CMP have been reviewed and found to be within acceptable limits, except were addressed above. She continues to manage  symptoms well with hydration and diet. She will completed labs every 3 months and return for re-evaluation in 6 months, sooner as needed. Will have her follow up with hematologist at next visit.        The patient verbalized agreement and understanding of current plan. All questions and concerns were addressed at time of visit.    Please note that this dictation was created using voice recognition software. I have made every reasonable attempt to correct obvious errors, but I expect that there are errors of grammar and possibly content that I did not discover before finalizing the note.

## 2023-02-28 NOTE — Clinical Note
I discussed some of my concerns with Cordell today - do you want me to have her follow up with you in 6 months or establish with him?

## 2023-03-05 ENCOUNTER — HOSPITAL ENCOUNTER (EMERGENCY)
Facility: MEDICAL CENTER | Age: 28
End: 2023-03-06
Attending: EMERGENCY MEDICINE
Payer: COMMERCIAL

## 2023-03-05 ENCOUNTER — APPOINTMENT (OUTPATIENT)
Dept: RADIOLOGY | Facility: MEDICAL CENTER | Age: 28
End: 2023-03-05
Attending: EMERGENCY MEDICINE
Payer: COMMERCIAL

## 2023-03-05 DIAGNOSIS — E86.0 DEHYDRATION: ICD-10-CM

## 2023-03-05 DIAGNOSIS — R55 VASOVAGAL SYNCOPES: ICD-10-CM

## 2023-03-05 DIAGNOSIS — R00.0 TACHYCARDIA: ICD-10-CM

## 2023-03-05 LAB
ALBUMIN SERPL BCP-MCNC: 5 G/DL (ref 3.2–4.9)
ALBUMIN/GLOB SERPL: 1.3 G/DL
ALP SERPL-CCNC: 58 U/L (ref 30–99)
ALT SERPL-CCNC: 16 U/L (ref 2–50)
ANION GAP SERPL CALC-SCNC: 14 MMOL/L (ref 7–16)
ANISOCYTOSIS BLD QL SMEAR: ABNORMAL
AST SERPL-CCNC: 61 U/L (ref 12–45)
BASOPHILS # BLD AUTO: 0.9 % (ref 0–1.8)
BASOPHILS # BLD: 0.16 K/UL (ref 0–0.12)
BILIRUB SERPL-MCNC: 4.3 MG/DL (ref 0.1–1.5)
BUN SERPL-MCNC: 8 MG/DL (ref 8–22)
CALCIUM ALBUM COR SERPL-MCNC: 8.7 MG/DL (ref 8.5–10.5)
CALCIUM SERPL-MCNC: 9.5 MG/DL (ref 8.5–10.5)
CHLORIDE SERPL-SCNC: 100 MMOL/L (ref 96–112)
CO2 SERPL-SCNC: 20 MMOL/L (ref 20–33)
CREAT SERPL-MCNC: 0.32 MG/DL (ref 0.5–1.4)
D DIMER PPP IA.FEU-MCNC: 1.25 UG/ML (FEU) (ref 0–0.5)
EKG IMPRESSION: NORMAL
EOSINOPHIL # BLD AUTO: 0.33 K/UL (ref 0–0.51)
EOSINOPHIL NFR BLD: 1.8 % (ref 0–6.9)
ERYTHROCYTE [DISTWIDTH] IN BLOOD BY AUTOMATED COUNT: 80 FL (ref 35.9–50)
GFR SERPLBLD CREATININE-BSD FMLA CKD-EPI: 146 ML/MIN/1.73 M 2
GLOBULIN SER CALC-MCNC: 3.9 G/DL (ref 1.9–3.5)
GLUCOSE SERPL-MCNC: 116 MG/DL (ref 65–99)
HCG SERPL QL: NEGATIVE
HCT VFR BLD AUTO: 22.3 % (ref 37–47)
HGB BLD-MCNC: 7.8 G/DL (ref 12–16)
LYMPHOCYTES # BLD AUTO: 6.09 K/UL (ref 1–4.8)
LYMPHOCYTES NFR BLD: 33.3 % (ref 22–41)
MACROCYTES BLD QL SMEAR: ABNORMAL
MANUAL DIFF BLD: NORMAL
MCH RBC QN AUTO: 30.4 PG (ref 27–33)
MCHC RBC AUTO-ENTMCNC: 35 G/DL (ref 33.6–35)
MCV RBC AUTO: 86.8 FL (ref 81.4–97.8)
MICROCYTES BLD QL SMEAR: ABNORMAL
MONOCYTES # BLD AUTO: 0.64 K/UL (ref 0–0.85)
MONOCYTES NFR BLD AUTO: 3.5 % (ref 0–13.4)
MORPHOLOGY BLD-IMP: NORMAL
NEUTROPHILS # BLD AUTO: 11.07 K/UL (ref 2–7.15)
NEUTROPHILS NFR BLD: 60.5 % (ref 44–72)
NRBC # BLD AUTO: 0.16 K/UL
NRBC BLD-RTO: 0.9 /100 WBC
OVALOCYTES BLD QL SMEAR: NORMAL
PLATELET # BLD AUTO: 554 K/UL (ref 164–446)
PLATELET BLD QL SMEAR: NORMAL
PMV BLD AUTO: 10.1 FL (ref 9–12.9)
POIKILOCYTOSIS BLD QL SMEAR: NORMAL
POLYCHROMASIA BLD QL SMEAR: NORMAL
POTASSIUM SERPL-SCNC: 4.1 MMOL/L (ref 3.6–5.5)
PROT SERPL-MCNC: 8.9 G/DL (ref 6–8.2)
RBC # BLD AUTO: 2.57 M/UL (ref 4.2–5.4)
RBC BLD AUTO: PRESENT
SICKLE CELLS BLD QL SMEAR: NORMAL
SODIUM SERPL-SCNC: 134 MMOL/L (ref 135–145)
TROPONIN T SERPL-MCNC: <6 NG/L (ref 6–19)
WBC # BLD AUTO: 18.3 K/UL (ref 4.8–10.8)

## 2023-03-05 PROCEDURE — 99285 EMERGENCY DEPT VISIT HI MDM: CPT

## 2023-03-05 PROCEDURE — 85025 COMPLETE CBC W/AUTO DIFF WBC: CPT

## 2023-03-05 PROCEDURE — 36415 COLL VENOUS BLD VENIPUNCTURE: CPT

## 2023-03-05 PROCEDURE — 93005 ELECTROCARDIOGRAM TRACING: CPT | Performed by: EMERGENCY MEDICINE

## 2023-03-05 PROCEDURE — 700105 HCHG RX REV CODE 258: Performed by: EMERGENCY MEDICINE

## 2023-03-05 PROCEDURE — 85007 BL SMEAR W/DIFF WBC COUNT: CPT

## 2023-03-05 PROCEDURE — 94760 N-INVAS EAR/PLS OXIMETRY 1: CPT

## 2023-03-05 PROCEDURE — 84703 CHORIONIC GONADOTROPIN ASSAY: CPT

## 2023-03-05 PROCEDURE — 700117 HCHG RX CONTRAST REV CODE 255: Performed by: EMERGENCY MEDICINE

## 2023-03-05 PROCEDURE — 71045 X-RAY EXAM CHEST 1 VIEW: CPT

## 2023-03-05 PROCEDURE — 80053 COMPREHEN METABOLIC PANEL: CPT

## 2023-03-05 PROCEDURE — 84484 ASSAY OF TROPONIN QUANT: CPT

## 2023-03-05 PROCEDURE — 71275 CT ANGIOGRAPHY CHEST: CPT

## 2023-03-05 PROCEDURE — 85379 FIBRIN DEGRADATION QUANT: CPT

## 2023-03-05 RX ORDER — SODIUM CHLORIDE 9 MG/ML
1000 INJECTION, SOLUTION INTRAVENOUS ONCE
Status: COMPLETED | OUTPATIENT
Start: 2023-03-05 | End: 2023-03-05

## 2023-03-05 RX ADMIN — SODIUM CHLORIDE 1000 ML: 9 INJECTION, SOLUTION INTRAVENOUS at 21:58

## 2023-03-05 RX ADMIN — IOHEXOL 43 ML: 350 INJECTION, SOLUTION INTRAVENOUS at 11:45

## 2023-03-05 RX ADMIN — SODIUM CHLORIDE 1000 ML: 9 INJECTION, SOLUTION INTRAVENOUS at 21:51

## 2023-03-05 ASSESSMENT — FIBROSIS 4 INDEX: FIB4 SCORE: 0.7

## 2023-03-06 VITALS
BODY MASS INDEX: 18.66 KG/M2 | WEIGHT: 112 LBS | TEMPERATURE: 97.1 F | RESPIRATION RATE: 18 BRPM | DIASTOLIC BLOOD PRESSURE: 57 MMHG | OXYGEN SATURATION: 90 % | HEIGHT: 65 IN | HEART RATE: 82 BPM | SYSTOLIC BLOOD PRESSURE: 106 MMHG

## 2023-03-06 NOTE — ED NOTES
Pt ambulate around pod with pulse oximeter. Pt VSS and expressing NAD. Pt denies light headedness, SOB, weakness or dizziness. MD notified and made aware

## 2023-03-06 NOTE — ED TRIAGE NOTES
"Chief Complaint   Patient presents with    Syncope    GLF     PT working tonight and had syncopla event x2. GLF. Pt +LOC, - thinner, -head strike.        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 Pt states feeling warm and getting tunnel vision then passed out. +GLF. Pt + LOC, -thinners, per pt report, -head strike. After event, per report, pt BP 79/40 and HR 50 once patient stood up, /67 and .  Pt Aox4, GCS15. - chest pain, - head aches, -N/V  Hx of Sickle Cell & asthma. Pt states she has had a syncopal evetn 6 months ago but -LOC.   "

## 2023-03-06 NOTE — ED NOTES
.Pt stable for discharge. Pt educated and reviewed discharge instructions with RN. Pt verbalized understanding & all questions were answered. Pt AoX 4. Pt ambulated independently with balanced and steady gait out of the ED with all belongings. Pt encouraged to come back if symptoms worsen.

## 2023-03-06 NOTE — ED NOTES
Pt ambulated to the bathroom independently with balanced and steady gait. Pt denies feeling dizzy, light headed or SOB.

## 2023-03-06 NOTE — ED PROVIDER NOTES
ED Provider Note    CHIEF COMPLAINT  Chief Complaint   Patient presents with    Syncope    GLF     PT working tonight and had syncopla event x2. GLF. Pt +LOC, - thinner, -head strike.      EXTERNAL RECORDS REVIEWED  Outpatient Notes hematology follow-up note from 2/28 the patient was seen for routine visit for her sickle cell, QIANA and sanjuana.  Noted that at that time she had intermittent headaches    HPI/ROS  LIMITATION TO HISTORY   Select: : None  OUTSIDE HISTORIAN(S):  none    Alicia Tejada is a 27 y.o. female with a history of asthma, recurrent depression, Hb-SS disease without crisis who presents to the emergency room for concerns regarding multiple syncopal events tonight.  Patient works as a GEN surge nurse on the floor and apparently had to successive events of lightheadedness leading to the weakness syncope.  She was reporting developing sensation of lightheadedness that she was going to pass out and was attempting to get to a place where she would be safe outside the patient's room and she initially went down.  It appears that she attempted to get up, was again lightheaded and went down to the ground.  Blood pressure at the scene was noted to be 79/40 and she appeared somewhat pale.  Patient reports in the last 24 hours she has not had any true syncope though she has had a sensation of some palpitations that she attributes to some of her anxiety that she gets intermittently.  Has had the associated anxiousness that she felt, this resolved spontaneously and was not associated with any chest pain, shortness of breath or lightheadedness.  She has had no exertional syncope or any exertional chest pain within the last month and has not had a pain crisis due to her sickle cell disease for many years.  At the time of my evaluation she is alert, interactive and in no acute distress at this time.    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    SURGICAL HISTORY   has a past surgical history that includes tonsillectomy and trev by laparoscopy (1/15/2019).    FAMILY HISTORY  Family History   Problem Relation Age of Onset    No Known Problems Mother     No Known Problems Father     No Known Problems Brother     Diabetes Maternal Grandmother         Type II    Hypertension Maternal Grandmother     Diabetes Paternal Grandmother         Type II    Hypertension Paternal Grandmother     No Known Problems Brother     GI Disease Brother         GI Ulcers    Other Maternal Uncle         Sickle Cell      SOCIAL HISTORY  Social History     Tobacco Use    Smoking status: Never    Smokeless tobacco: Never   Vaping Use    Vaping Use: Never used   Substance and Sexual Activity    Alcohol use: Yes     Comment: socially.     Drug use: Never    Sexual activity: Yes     Partners: Male     Birth control/protection: Condom, Implant     CURRENT MEDICATIONS  Home Medications    **Home medications have not yet been reviewed for this encounter**       ALLERGIES  No Known Allergies    PHYSICAL EXAM  VITAL SIGNS: BP 96/53   Pulse 100   Temp 35.8 °C (96.5 °F) (Oral)   Resp 18   Ht 1.651 m (5' 5\")   Wt 50.8 kg (112 lb)   SpO2 92%   BMI 18.64 kg/m²    Genl: F, sitting in chair comfortably, speaking clearly, appears in no acute distress   Head: NC/AT   ENT: Mucous membranes slightly dry, posterior pharynx clear, uvula midline, nares patent bilaterally   Eyes: Normal sclera, pupils equal round reactive to light  Neck: Supple, FROM, no LAD appreciated   Pulmonary: Lungs are clear to auscultation bilaterally  Chest: No TTP  CV:  RRR, faint systolic murmur appreciated, pulses 2+ in both upper and lower extremities,  Abdomen: soft, NT/ND; no rebound/guarding, no masses palpated, no HSM   : no CVA or suprapubic tenderness   Musculoskeletal: Pain " free ROM of the neck. Moving upper and lower extremities and spontaneous in coordinated fashion  Neuro: A&Ox4 (person, place, time, situation), speech fluent, gait steady, no focal deficits appreciated, No cerebellar signs. Sensation is grossly intact in the distal upper and lower extremities.  5/5 strength in  and dorsiflexion/plantar flexion of the ankles  Psych: Patient has an appropriate affect and behavior  Skin: No rash or lesions.  No pallor or jaundice.  No cyanosis.  Warm and dry.     DIAGNOSTIC STUDIES / PROCEDURES  EKG  I have independently interpreted this EKG  Results for orders placed or performed during the hospital encounter of 23   EKG   Result Value Ref Range    Report       [facility] Emergency Dept.    Test Date:  2023  Pt Name:    TOM GROVE               Department: ER  MRN:        [id number]                      Room:       [facility]  Gender:     Female                       Technician: [id number]  :        1995                   Requested By:DREA MUSTAFA  Order #:    [id number]                    Reading MD: Juan Montoya MD    Measurements  Intervals                                Axis  Rate:       81                           P:          51  LA:         164                          QRS:        56  QRSD:       98                           T:          21  QT:         385  QTc:        447    Interpretive Statements  Sinus rhythm, Rate of 81, normal intervals, normal axis, no delta waves noted  in  V6, no ST abnormalities noted in V1 or V2, no signs of acute ischemia or  infarction.  Compared to ECG 2019 09:06:51  No significant changes  Electronically Signed On 3-5-2023 23:52:04 PST by Juan Montoya MD       LABS  Labs Reviewed   CBC WITH DIFFERENTIAL - Abnormal; Notable for the following components:       Result Value    WBC 18.3 (*)     RBC 2.57 (*)     Hemoglobin 7.8 (*)     Hematocrit 22.3 (*)     RDW 80.0 (*)     Platelet Count 554 (*)      Neutrophils (Absolute) 11.07 (*)     Lymphs (Absolute) 6.09 (*)     Baso (Absolute) 0.16 (*)     All other components within normal limits    Narrative:     Biotin intake of greater than 5 mg per day may interfere with  troponin levels, causing false low values.   COMP METABOLIC PANEL - Abnormal; Notable for the following components:    Sodium 134 (*)     Glucose 116 (*)     Creatinine 0.32 (*)     AST(SGOT) 61 (*)     Total Bilirubin 4.3 (*)     Albumin 5.0 (*)     Total Protein 8.9 (*)     Globulin 3.9 (*)     All other components within normal limits    Narrative:     Biotin intake of greater than 5 mg per day may interfere with  troponin levels, causing false low values.   D-DIMER - Abnormal; Notable for the following components:    D-Dimer 1.25 (*)     All other components within normal limits    Narrative:     Biotin intake of greater than 5 mg per day may interfere with  troponin levels, causing false low values.   TROPONIN    Narrative:     Biotin intake of greater than 5 mg per day may interfere with  troponin levels, causing false low values.   HCG QUAL SERUM    Narrative:     Biotin intake of greater than 5 mg per day may interfere with  troponin levels, causing false low values.   CORRECTED CALCIUM    Narrative:     Biotin intake of greater than 5 mg per day may interfere with  troponin levels, causing false low values.   ESTIMATED GFR    Narrative:     Biotin intake of greater than 5 mg per day may interfere with  troponin levels, causing false low values.   DIFFERENTIAL MANUAL    Narrative:     Biotin intake of greater than 5 mg per day may interfere with  troponin levels, causing false low values.   PERIPHERAL SMEAR REVIEW    Narrative:     Biotin intake of greater than 5 mg per day may interfere with  troponin levels, causing false low values.   PLATELET ESTIMATE    Narrative:     Biotin intake of greater than 5 mg per day may interfere with  troponin levels, causing false low values.   MORPHOLOGY     Narrative:     Biotin intake of greater than 5 mg per day may interfere with  troponin levels, causing false low values.     RADIOLOGY  I have independently interpreted the diagnostic imaging associated with this visit and am waiting the final reading from the radiologist.   My preliminary interpretation is as follows: No focal cardiopulmonary abnormalities noted on chest x-ray.  CTA of the chest shows no acute structural abnormalities or evidence of PE on my read.  Radiologist interpretation:   CT-CTA CHEST PULMONARY ARTERY W/ RECONS   Final Result         1.  No pulmonary embolus appreciated.      DX-CHEST-PORTABLE (1 VIEW)   Final Result         1.  No acute cardiopulmonary disease.        COURSE & MEDICAL DECISION MAKING    ED Observation Status? No; Patient does not meet criteria for ED Observation.     INITIAL ASSESSMENT, COURSE AND PLAN  Heart arrhythmia  ACS  Neurocardiogenic  Seizure  GI bleeding  Vasovagal syncope/Orthostasis  Dehydration  Polypharmacy  Anemia  PE    Care Narrative: Patient was seen and evaluated for symptoms as described above.  Patient did describe having flushing sensations and overall prodromal symptomology while in the patient's room leading to her trying to de-escalate and eventually having a syncopal event.  The overall times that this occurred her back to back and again are prodromal with no sudden syncope to suggest cardiogenic cause my only primary concern is the description of what she describes as panic or severe anxiety with possible palpitations in preceding days.  I do believe that there is a established diagnosis and is likely true cannot fully exclude the possibility that she is having some inappropriate tachycardia that could be the result of a prothrombotic event.  After discussion with the patient lab work for the broad differential is ordered and this includes a D-dimer.  EKG did not show any evidence of acute ischemia, no infarction, no atypical arrhythmia pathways.   His lab work shows a leukocytosis at likely in the absence of febrile illness or persistent tachycardia unrelated to true infectious etiology and is more the result of stress demargination from her syncopal event.  Her D-dimer was elevated and CTA chest was ordered.  Troponin is not elevated at this time and there is no other gross electrolyte abnormalities, ANNAMARIE and the patient has a stable and improved chronic anemia.    Chest x-ray showed no evidence of infiltrates that would indicate an acute chest syndrome.  Does not have overall symptomology consistent with strokelike pathology and she does not have persistent ongoing dactylitis or other concerning features of poorly controlled sickle cell disease.    CTA of the chest shows no evidence of acute cardiac or pulmonary abnormalities and following fluid resuscitation she does not have orthostasis and does not have any hypoxia or recreation of her symptoms while walking.  She feels substantially improved and would like to be discharged home.  I had an extensive conversation with her that the symptoms were to progress admission and tilt table testing or prolonged cardiac monitoring via Holter monitor would likely be recommended.  She feels comfortable with her background of coming back should she have any evolving complaints and questions are addressed at the bedside prior to discharge home.    HYDRATION: Based on the patient's presentation of Other syncope/hypotension, the patient was given IV fluids. IV Hydration was used because oral hydration was not as rapid as required. Upon recheck following hydration, the patient was improved.      DISPOSITION AND DISCUSSIONS  I have discussed management of the patient with the following physicians and MIRZA's:  none    Discussion of management with other QHP or appropriate source(s): None     Escalation of care considered, and ultimately not performed:acute inpatient care management, however at this time, the patient is most  appropriate for outpatient management    FINAL DIAGNOSIS  1. Vasovagal syncopes    2. Dehydration    3. Tachycardia      Electronically signed by: Jan Martinez M.D., 3/5/2023 9:24 PM

## 2023-03-09 ENCOUNTER — HOSPITAL ENCOUNTER (OUTPATIENT)
Dept: LAB | Facility: MEDICAL CENTER | Age: 28
End: 2023-03-09
Attending: NURSE PRACTITIONER
Payer: COMMERCIAL

## 2023-03-09 ENCOUNTER — HOSPITAL ENCOUNTER (OUTPATIENT)
Dept: CARDIOLOGY | Facility: MEDICAL CENTER | Age: 28
End: 2023-03-09
Attending: NURSE PRACTITIONER
Payer: COMMERCIAL

## 2023-03-09 DIAGNOSIS — D57.1 HB-SS DISEASE WITHOUT CRISIS (HCC): ICD-10-CM

## 2023-03-09 DIAGNOSIS — J43.9 MIXED RESTRICTIVE AND OBSTRUCTIVE LUNG DISEASE (HCC): ICD-10-CM

## 2023-03-09 DIAGNOSIS — R01.1 HEART MURMUR: ICD-10-CM

## 2023-03-09 DIAGNOSIS — J98.4 MIXED RESTRICTIVE AND OBSTRUCTIVE LUNG DISEASE (HCC): ICD-10-CM

## 2023-03-09 DIAGNOSIS — R79.89 ABNORMAL BILIRUBIN TEST: ICD-10-CM

## 2023-03-09 LAB
BILIRUB CONJ SERPL-MCNC: 0.3 MG/DL (ref 0.1–0.5)
BILIRUB INDIRECT SERPL-MCNC: 3.5 MG/DL (ref 0–1)
BILIRUB SERPL-MCNC: 3.8 MG/DL (ref 0.1–1.5)
HGB RETIC QN AUTO: 31.9 PG/CELL (ref 29–35)
IMM RETICS NFR: 46 % (ref 9.3–17.4)
LV EJECT FRACT  99904: 55
LV EJECT FRACT MOD 2C 99903: 54.94
LV EJECT FRACT MOD 4C 99902: 55.69
LV EJECT FRACT MOD BP 99901: 53.88
RETICS # AUTO: 0.27 M/UL (ref 0.04–0.06)
RETICS/RBC NFR: 11.4 % (ref 0.8–2.1)

## 2023-03-09 PROCEDURE — 36415 COLL VENOUS BLD VENIPUNCTURE: CPT

## 2023-03-09 PROCEDURE — 82248 BILIRUBIN DIRECT: CPT

## 2023-03-09 PROCEDURE — 85046 RETICYTE/HGB CONCENTRATE: CPT

## 2023-03-09 PROCEDURE — 93306 TTE W/DOPPLER COMPLETE: CPT

## 2023-03-09 PROCEDURE — 82247 BILIRUBIN TOTAL: CPT

## 2023-03-09 PROCEDURE — 83021 HEMOGLOBIN CHROMOTOGRAPHY: CPT

## 2023-03-09 PROCEDURE — 85660 RBC SICKLE CELL TEST: CPT

## 2023-03-09 PROCEDURE — 93306 TTE W/DOPPLER COMPLETE: CPT | Mod: 26 | Performed by: INTERNAL MEDICINE

## 2023-03-09 PROCEDURE — 83020 HEMOGLOBIN ELECTROPHORESIS: CPT

## 2023-03-13 LAB
HGB A1 MFR BLD: 0 % (ref 95–97.9)
HGB A2 MFR BLD: 3.3 % (ref 2–3.5)
HGB C MFR BLD: 0 % (ref 0–0)
HGB E MFR BLD: 0 % (ref 0–0)
HGB F MFR BLD: 6 % (ref 0–2.1)
HGB FRACT BLD ELPH-IMP: ABNORMAL
HGB OTHER MFR BLD: 2.1 % (ref 0–0)
HGB S BLD QL SOLY: POSITIVE
HGB S MFR BLD: 88.6 % (ref 0–0)
PATH INTERP BLD-IMP: ABNORMAL

## 2023-03-13 PROCEDURE — 85660 RBC SICKLE CELL TEST: CPT

## 2023-05-16 NOTE — PROGRESS NOTES
This evaluation was conducted via Zoom using secure and encrypted videoconferencing technology. The patient was [place] [facility] [place].    The patient's identity was confirmed and verbal consent was obtained for this virtual visit.    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 Has chronic anemia with elevated reticulocyte count and elevated bilirubin. Does not tend to get crises. Does have 6% Hgb F. I last saw her in 2/2021. She subsequently has seen SIXTO Mccoy several times. She is also established with [facility] [place]. She does run an elevated reticulocyte count and indirect bilirubin chronically.Taking OTC folic acid 400 mcg - takes 2 a day. Feels well. Lab stable for her.     ROS:    Constitutional: No weight loss  Skin: No rash or jaundice  HENT: No change in eyesight or hearing  Cardiovascular:No chest pain or arrythmia  Respiratory:No cough or SOB  GI:No nausea, vomiting, diarrhea, constipation  :No dysuria or frequency  Musculoskeletal:No bone or joint pain  Neuro:No sx's of neuropathy  Psych: No complaints    PMH:      No Known Allergies    Past Medical History:   Diagnosis Date    Anemia     Anxiety     Asthma     Inhaler use PRN.    Mixed restrictive and obstructive lung disease (HCC) 8/5/2010    Nausea, vomiting, and diarrhea 3/12/2021    Premature osteoporosis     Sickle cell anemia (HCC)     Snoring     Negative sleep study.        Past Surgical History:   Procedure Laterality Date    ANUP BY LAPAROSCOPY  1/15/2019    Procedure: laparoscopic cholecystectomy;  Surgeon: Noel Greene M.D.;  Location: SUR[facility];  Service: General    TONSILLECTOMY          Medications:    Current Outpatient Medications on File Prior to Visit   Medication Sig Dispense Refill    cetirizine (ZYRTEC) 10 MG Tab Take 10 mg by mouth every  day.      citalopram (CELEXA) 40 MG Tab Take 1 Tablet by mouth every day. 90 Tablet 3    azelastine (OPTIVAR) 0.05 % ophthalmic solution Administer 1 Drop into both eyes 2 times a day. 6 mL 1    albuterol 108 (90 Base) MCG/ACT Aero Soln inhalation aerosol Inhale 2 Puffs every 6 hours as needed.      esomeprazole (NEXIUM) 20 MG capsule Take 1 Capsule by mouth every morning before breakfast. 90 Capsule 3    etonogestrel (NEXPLANON) 68 MG Implant implant Inject 1 Each under the skin one time.       No current facility-administered medications on file prior to visit.       Social History     Tobacco Use    Smoking status: Never    Smokeless tobacco: Never   Vaping Use    Vaping Use: Never used   Substance Use Topics    Alcohol use: Yes     Comment: socially.         Family History   Problem Relation Age of Onset    No Known Problems Mother     No Known Problems Father     No Known Problems Brother     Diabetes Maternal Grandmother         Type II    Hypertension Maternal Grandmother     Diabetes Paternal Grandmother         Type II    Hypertension Paternal Grandmother     No Known Problems Brother     GI Disease Brother         GI Ulcers    Other Maternal Uncle         Sickle Cell         Objective    Vitals:    There were no vitals taken for this visit.    Physical Exam:    Appears well-developed and well-nourished. No distress.    Head -  Normocephalic .   Eyes - Pupils are equal. Conjunctivae normal. No scleral icterus.   Ears - normal hearing  Neurological -   Alert and oriented.  Skin -  No rash noted. Not diaphoretic. No erythema. No pallor. No jaundice   Psychiatric -  Normal mood and affect.    Labs:     Latest Reference Range & Units 03/09/23 12:36   Hemoglobin A1 95.0 - 97.9 % 0.0 (L)   Hemoglobin A2 2.0 - 3.5 % 3.3   Hemoglobin F 0.0 - 2.1 % 6.0 (H)   Hemoglobin S 0.0 - 0.0 % 88.6 (H)   Hemaglobin C 0.0 - 0.0 % 0.0   Hemoglobin E 0.0 - 0.0 % 0.0   Hemoglobin Eval  Abnormal !      Latest Reference Range &  Units 03/05/23 21:23 06/28/23 08:02   WBC 4.8 - 10.8 K/uL 18.3 (H) 14.3 (H)   RBC 4.20 - 5.40 M/uL 2.57 (L) 2.45 (L)   Hemoglobin 12.0 - 16.0 g/dL 7.8 (L) 7.2 (L)   Hematocrit 37.0 - 47.0 % 22.3 (L) 20.5 (L)   MCV 81.4 - 97.8 fL 86.8 83.7   MCH 27.0 - 33.0 pg 30.4 29.4   MCHC 32.2 - 35.5 g/dL 35.0 35.1   RDW 35.9 - 50.0 fL 80.0 (H) 71.4 (H)   Platelet Count 164 - 446 K/uL 554 (H) 555 (H)        Latest Reference Range & Units 03/09/23 12:36   Reticulocyte Count 0.8 - 2.1 % 11.4 (H)   Retic, Absolute 0.04 - 0.06 M/uL 0.27 (H)      Latest Reference Range & Units 03/09/23 12:36   Total Bilirubin 0.1 - 1.5 mg/dL 3.8 (H)   Direct Bilirubin 0.1 - 0.5 mg/dL 0.3   Indirect Bilirubin 0.0 - 1.0 mg/dL 3.5 (H)      Latest Reference Range & Units 02/17/22 12:24 09/12/22 13:24 01/19/23 15:08 03/05/23 21:23 03/09/23 12:36   Total Bilirubin 0.1 - 1.5 mg/dL 3.1 (H) 4.2 (H) 3.3 (H) 4.3 (H) 3.8 (H)      Latest Reference Range & Units 06/28/23 08:02   Sodium 135 - 145 mmol/L 135   Potassium 3.6 - 5.5 mmol/L 4.8   Chloride 96 - 112 mmol/L 99   Co2 20 - 33 mmol/L 23   Anion Gap 7.0 - 16.0  13.0   Glucose 65 - 99 mg/dL 77   Bun 8 - 22 mg/dL 8   Creatinine 0.50 - 1.40 mg/dL 0.38 (L)   GFR (CKD-EPI) >60 mL/min/1.73 m 2 140   Calcium 8.5 - 10.5 mg/dL 9.4   Correct Calcium 8.5 - 10.5 mg/dL 8.9   AST(SGOT) 12 - 45 U/L 56 (H)   ALT(SGPT) 2 - 50 U/L 11   Alkaline Phosphatase 30 - 99 U/L 53   Total Bilirubin 0.1 - 1.5 mg/dL 3.3 (H)   Albumin 3.2 - 4.9 g/dL 4.6   Total Protein 6.0 - 8.2 g/dL 7.9   Globulin 1.9 - 3.5 g/dL 3.3   A-G Ratio g/dL 1.4     Assessment    Imp:    Visit Diagnosis:    1. 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 Total bilirubin, elevated        3. Functional asplenia            Plan:  CBC q 3 months - MyChart message me if any issues  See Estrellita 2/2024    Mike Morin M.D.

## 2023-06-28 ENCOUNTER — HOSPITAL ENCOUNTER (OUTPATIENT)
Dept: LAB | Facility: MEDICAL CENTER | Age: 28
End: 2023-06-28
Attending: NURSE PRACTITIONER
Payer: COMMERCIAL

## 2023-06-28 DIAGNOSIS — D57.1 HB-SS DISEASE WITHOUT CRISIS (HCC): ICD-10-CM

## 2023-06-28 LAB
ALBUMIN SERPL BCP-MCNC: 4.6 G/DL (ref 3.2–4.9)
ALBUMIN/GLOB SERPL: 1.4 G/DL
ALP SERPL-CCNC: 53 U/L (ref 30–99)
ALT SERPL-CCNC: 11 U/L (ref 2–50)
ANION GAP SERPL CALC-SCNC: 13 MMOL/L (ref 7–16)
ANISOCYTOSIS BLD QL SMEAR: ABNORMAL
AST SERPL-CCNC: 56 U/L (ref 12–45)
BASOPHILS # BLD AUTO: 0.5 % (ref 0–1.8)
BASOPHILS # BLD: 0.07 K/UL (ref 0–0.12)
BILIRUB SERPL-MCNC: 3.3 MG/DL (ref 0.1–1.5)
BUN SERPL-MCNC: 8 MG/DL (ref 8–22)
CALCIUM ALBUM COR SERPL-MCNC: 8.9 MG/DL (ref 8.5–10.5)
CALCIUM SERPL-MCNC: 9.4 MG/DL (ref 8.5–10.5)
CHLORIDE SERPL-SCNC: 99 MMOL/L (ref 96–112)
CO2 SERPL-SCNC: 23 MMOL/L (ref 20–33)
COMMENT 1642: NORMAL
CREAT SERPL-MCNC: 0.38 MG/DL (ref 0.5–1.4)
EOSINOPHIL # BLD AUTO: 0.52 K/UL (ref 0–0.51)
EOSINOPHIL NFR BLD: 3.6 % (ref 0–6.9)
ERYTHROCYTE [DISTWIDTH] IN BLOOD BY AUTOMATED COUNT: 71.4 FL (ref 35.9–50)
GFR SERPLBLD CREATININE-BSD FMLA CKD-EPI: 140 ML/MIN/1.73 M 2
GLOBULIN SER CALC-MCNC: 3.3 G/DL (ref 1.9–3.5)
GLUCOSE SERPL-MCNC: 77 MG/DL (ref 65–99)
HCT VFR BLD AUTO: 20.5 % (ref 37–47)
HGB BLD-MCNC: 7.2 G/DL (ref 12–16)
IMM GRANULOCYTES # BLD AUTO: 0.06 K/UL (ref 0–0.11)
IMM GRANULOCYTES NFR BLD AUTO: 0.4 % (ref 0–0.9)
LYMPHOCYTES # BLD AUTO: 4.14 K/UL (ref 1–4.8)
LYMPHOCYTES NFR BLD: 28.9 % (ref 22–41)
MACROCYTES BLD QL SMEAR: ABNORMAL
MCH RBC QN AUTO: 29.4 PG (ref 27–33)
MCHC RBC AUTO-ENTMCNC: 35.1 G/DL (ref 32.2–35.5)
MCV RBC AUTO: 83.7 FL (ref 81.4–97.8)
MONOCYTES # BLD AUTO: 0.84 K/UL (ref 0–0.85)
MONOCYTES NFR BLD AUTO: 5.9 % (ref 0–13.4)
MORPHOLOGY BLD-IMP: NORMAL
NEUTROPHILS # BLD AUTO: 8.68 K/UL (ref 1.82–7.42)
NEUTROPHILS NFR BLD: 60.7 % (ref 44–72)
NRBC # BLD AUTO: 0.13 K/UL
NRBC BLD-RTO: 0.9 /100 WBC (ref 0–0.2)
PLATELET # BLD AUTO: 555 K/UL (ref 164–446)
PLATELET BLD QL SMEAR: NORMAL
PMV BLD AUTO: 10.4 FL (ref 9–12.9)
POIKILOCYTOSIS BLD QL SMEAR: NORMAL
POTASSIUM SERPL-SCNC: 4.8 MMOL/L (ref 3.6–5.5)
PROT SERPL-MCNC: 7.9 G/DL (ref 6–8.2)
RBC # BLD AUTO: 2.45 M/UL (ref 4.2–5.4)
RBC BLD AUTO: PRESENT
SICKLE CELLS BLD QL SMEAR: NORMAL
SODIUM SERPL-SCNC: 135 MMOL/L (ref 135–145)
TARGETS BLD QL SMEAR: NORMAL
WBC # BLD AUTO: 14.3 K/UL (ref 4.8–10.8)

## 2023-06-28 PROCEDURE — 80053 COMPREHEN METABOLIC PANEL: CPT

## 2023-06-28 PROCEDURE — 36415 COLL VENOUS BLD VENIPUNCTURE: CPT

## 2023-06-28 PROCEDURE — 85025 COMPLETE CBC W/AUTO DIFF WBC: CPT

## 2023-06-30 ENCOUNTER — HOSPITAL ENCOUNTER (OUTPATIENT)
Dept: HEMATOLOGY ONCOLOGY | Facility: MEDICAL CENTER | Age: 28
End: 2023-06-30
Attending: INTERNAL MEDICINE
Payer: COMMERCIAL

## 2023-06-30 VITALS — BODY MASS INDEX: 18.64 KG/M2 | HEIGHT: 65 IN

## 2023-06-30 DIAGNOSIS — Q89.01 FUNCTIONAL ASPLENIA: ICD-10-CM

## 2023-06-30 DIAGNOSIS — R17 TOTAL BILIRUBIN, ELEVATED: ICD-10-CM

## 2023-06-30 DIAGNOSIS — D57.1 HB-SS DISEASE WITHOUT CRISIS (HCC): ICD-10-CM

## 2023-06-30 PROCEDURE — 99213 OFFICE O/P EST LOW 20 MIN: CPT | Mod: 95 | Performed by: INTERNAL MEDICINE

## 2023-07-13 ENCOUNTER — EH NON-PROVIDER (OUTPATIENT)
Dept: OCCUPATIONAL MEDICINE | Facility: CLINIC | Age: 28
End: 2023-07-13

## 2023-07-13 DIAGNOSIS — Z02.89 ENCOUNTER FOR OCCUPATIONAL HEALTH EXAMINATION INVOLVING RESPIRATOR: ICD-10-CM

## 2023-07-13 PROCEDURE — 94375 RESPIRATORY FLOW VOLUME LOOP: CPT | Performed by: NURSE PRACTITIONER

## 2023-08-09 ENCOUNTER — TELEPHONE (OUTPATIENT)
Dept: MEDICAL GROUP | Facility: MEDICAL CENTER | Age: 28
End: 2023-08-09
Payer: COMMERCIAL

## 2023-08-09 DIAGNOSIS — F41.9 ANXIETY: ICD-10-CM

## 2023-08-09 RX ORDER — CITALOPRAM 40 MG/1
40 TABLET ORAL DAILY
Qty: 90 TABLET | Refills: 3 | Status: SHIPPED | OUTPATIENT
Start: 2023-08-09 | End: 2024-03-14

## 2023-09-06 ENCOUNTER — APPOINTMENT (OUTPATIENT)
Dept: MEDICAL GROUP | Facility: MEDICAL CENTER | Age: 28
End: 2023-09-06
Payer: COMMERCIAL

## 2023-09-06 ENCOUNTER — TELEMEDICINE (OUTPATIENT)
Dept: MEDICAL GROUP | Facility: MEDICAL CENTER | Age: 28
End: 2023-09-06
Payer: COMMERCIAL

## 2023-09-06 DIAGNOSIS — F41.9 ANXIETY: ICD-10-CM

## 2023-09-06 PROCEDURE — 99213 OFFICE O/P EST LOW 20 MIN: CPT | Mod: 95 | Performed by: NURSE PRACTITIONER

## 2023-09-06 NOTE — LETTER
SIXTO Mendoza.  [facility]   [address] [facility] [place] [address]  Phone: [phone] - Fax: [phone]        September 6, 2023       Patient: Alicia Tejada   YOB: 1995   Date of Visit: 9/6/2023         To Whom It May Concern:    Alicia Tejada is under my medical care for Anxiety. I recommend that she have her cat, Ruben, with her in her residence as her emotional support animal.    She is responsible to behavior, grooming, and cleanliness of animal.     If you have any questions or concerns, please don't hesitate to call [phone]      Sincerely,          MARQUES Mendoza  Electronically Signed

## 2023-09-07 NOTE — PROGRESS NOTES
Telemedicine: Established Patient   This evaluation was conducted via Zoom using secure and encrypted videoconferencing technology. The patient was in a private location outside of their home in the state of [place].    The patient's identity was confirmed and verbal consent was obtained for this virtual visit.    Subjective:   CC: GARFIELD letter  Alicia Tejada is a 28 y.o. female presenting for evaluation and management of:    Anxiety  Chronic. Stable on citalopram 40 mg daily. Patient has her emotional support animal with her, had cat certified as GARFIELD. Patient moving to new apartment and requesting GARFIELD letter for cat.      ROS   Positive ROS as per HPI. All other systems reviewed and are negative.    No Known Allergies    Current medicines (including changes today)  Current Outpatient Medications   Medication Sig Dispense Refill    citalopram (CELEXA) 40 MG Tab Take 1 Tablet by mouth every day. 90 Tablet 3    cetirizine (ZYRTEC) 10 MG Tab Take 10 mg by mouth every day.      azelastine (OPTIVAR) 0.05 % ophthalmic solution Administer 1 Drop into both eyes 2 times a day. 6 mL 1    albuterol 108 (90 Base) MCG/ACT Aero Soln inhalation aerosol Inhale 2 Puffs every 6 hours as needed.      esomeprazole (NEXIUM) 20 MG capsule Take 1 Capsule by mouth every morning before breakfast. 90 Capsule 3    etonogestrel (NEXPLANON) 68 MG Implant implant Inject 1 Each under the skin one time.       No current facility-administered medications for this visit.       Patient Active Problem List    Diagnosis Date Noted    Other constipation 10/10/2022    Heart murmur 05/02/2022    Difficulty concentrating 03/18/2022    Attention deficit hyperactivity disorder (ADHD), combined type 03/18/2022    Mild episode of recurrent major depressive disorder (HCC) 03/18/2022    Annual physical exam 03/16/2022    Premature osteoporosis 04/09/2021    History of vertebral fracture 04/09/2021    Vitamin D deficiency 04/09/2021    Anemia 03/12/2021     Functional asplenia 03/12/2021    Asthma     Seasonal allergic rhinitis due to pollen 05/09/2019    Total bilirubin, elevated 09/13/2018    Abnormal bilirubin test 09/13/2018    Anxiety 08/16/2018    Hb-SS disease without crisis (HCC) 08/16/2018    Environmental allergies 01/27/2015    Mixed restrictive and obstructive lung disease (HCC) 08/05/2010    Low bone density for age 04/28/2010       Family History   Problem Relation Age of Onset    No Known Problems Mother     No Known Problems Father     No Known Problems Brother     Diabetes Maternal Grandmother         Type II    Hypertension Maternal Grandmother     Diabetes Paternal Grandmother         Type II    Hypertension Paternal Grandmother     No Known Problems Brother     GI Disease Brother         GI Ulcers    Other Maternal Uncle         Sickle Cell        She  has a past medical history of Anemia, Anxiety, Asthma, Mixed restrictive and obstructive lung disease (HCC) (8/5/2010), Nausea, vomiting, and diarrhea (3/12/2021), Premature osteoporosis, Sickle cell anemia (HCC), and Snoring.  She  has a past surgical history that includes tonsillectomy and trev by laparoscopy (1/15/2019).       Objective:   There were no vitals taken for this visit.    Physical Exam:  Constitutional: Alert, no distress, well-groomed.  Skin: No rashes in visible areas.  Eye: Round. Conjunctiva clear, lids normal. No icterus.   ENMT: Lips pink without lesions, good dentition, moist mucous membranes. Phonation normal.  Neck: No masses, no thyromegaly. Moves freely without pain.  CV: Pulse as reported by patient  Respiratory: Unlabored respiratory effort, no cough or audible wheeze  Psych: Alert and oriented x3, normal affect and mood.       Assessment and Plan:   The following treatment plan was discussed:     1. Anxiety  Stable on citalopram 40 mg daily  GARFIELD letter written      Follow-up: Return if symptoms worsen or fail to improve.

## 2023-09-07 NOTE — ASSESSMENT & PLAN NOTE
Chronic. Stable on citalopram 40 mg daily. Patient has her emotional support animal with her, had cat certified as GARFIELD. Patient moving to new apartment and requesting GARFIELD letter for cat.

## 2023-11-01 ENCOUNTER — APPOINTMENT (OUTPATIENT)
Dept: MEDICAL GROUP | Facility: MEDICAL CENTER | Age: 28
End: 2023-11-01
Payer: COMMERCIAL

## 2023-11-10 ENCOUNTER — APPOINTMENT (OUTPATIENT)
Dept: MEDICAL GROUP | Facility: MEDICAL CENTER | Age: 28
End: 2023-11-10
Payer: COMMERCIAL

## 2024-02-07 ENCOUNTER — APPOINTMENT (OUTPATIENT)
Dept: MEDICAL GROUP | Facility: MEDICAL CENTER | Age: 29
End: 2024-02-07
Payer: COMMERCIAL

## 2024-02-25 ENCOUNTER — OFFICE VISIT (OUTPATIENT)
Dept: URGENT CARE | Facility: CLINIC | Age: 29
End: 2024-02-25
Payer: COMMERCIAL

## 2024-02-25 ENCOUNTER — APPOINTMENT (OUTPATIENT)
Dept: RADIOLOGY | Facility: IMAGING CENTER | Age: 29
End: 2024-02-25
Payer: COMMERCIAL

## 2024-02-25 VITALS
WEIGHT: 119.6 LBS | BODY MASS INDEX: 19.93 KG/M2 | HEART RATE: 85 BPM | OXYGEN SATURATION: 93 % | DIASTOLIC BLOOD PRESSURE: 70 MMHG | SYSTOLIC BLOOD PRESSURE: 120 MMHG | HEIGHT: 65 IN | RESPIRATION RATE: 20 BRPM | TEMPERATURE: 98.1 F

## 2024-02-25 DIAGNOSIS — R07.89 ACUTE CHEST WALL PAIN: ICD-10-CM

## 2024-02-25 PROCEDURE — 3074F SYST BP LT 130 MM HG: CPT

## 2024-02-25 PROCEDURE — 3078F DIAST BP <80 MM HG: CPT

## 2024-02-25 PROCEDURE — 99213 OFFICE O/P EST LOW 20 MIN: CPT

## 2024-02-25 PROCEDURE — 71101 X-RAY EXAM UNILAT RIBS/CHEST: CPT | Mod: TC,RT

## 2024-02-25 ASSESSMENT — ENCOUNTER SYMPTOMS
FEVER: 0
SHORTNESS OF BREATH: 1
DIZZINESS: 0
MYALGIAS: 1
WEAKNESS: 0
CHILLS: 0
HEADACHES: 0
COUGH: 0

## 2024-02-25 ASSESSMENT — FIBROSIS 4 INDEX: FIB4 SCORE: 0.85

## 2024-02-25 NOTE — PROGRESS NOTES
Chief Complaint   Patient presents with    Rib Injury     Friday night, hurt it while dancing, woke up with extreme rib pain        HISTORY OF PRESENT ILLNESS: Patient is a pleasant 28 y.o. female who presents to urgent care today ongoing right-sided chest wall pain after dancing on Friday.  Patient denies falling.  Notes some mild shortness of breath along with bruising under her right breast.    Patient Active Problem List    Diagnosis Date Noted    Other constipation 10/10/2022    Heart murmur 05/02/2022    Difficulty concentrating 03/18/2022    Attention deficit hyperactivity disorder (ADHD), combined type 03/18/2022    Mild episode of recurrent major depressive disorder (HCC) 03/18/2022    Annual physical exam 03/16/2022    Premature osteoporosis 04/09/2021    History of vertebral fracture 04/09/2021    Vitamin D deficiency 04/09/2021    Anemia 03/12/2021    Functional asplenia 03/12/2021    Asthma     Seasonal allergic rhinitis due to pollen 05/09/2019    Total bilirubin, elevated 09/13/2018    Abnormal bilirubin test 09/13/2018    Anxiety 08/16/2018    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    Current Outpatient Medications Ordered in Epic   Medication Sig Dispense Refill    citalopram (CELEXA) 40 MG Tab Take 1 Tablet by mouth every day. 90 Tablet 3    cetirizine (ZYRTEC) 10 MG Tab Take 10 mg by mouth every day.      albuterol 108 (90 Base) MCG/ACT Aero Soln inhalation aerosol Inhale 2 Puffs every 6 hours as needed.      esomeprazole (NEXIUM) 20 MG capsule Take 1 Capsule by mouth every morning before breakfast. 90 Capsule 3    etonogestrel (NEXPLANON) 68 MG Implant implant Inject 1 Each under the skin one time.       No current [facility]-ordered facility-administered medications on file.       Past Medical History:  "  Diagnosis Date    Anemia     Anxiety     Asthma     Inhaler use PRN.    Mixed restrictive and obstructive lung disease (HCC) 2010    Nausea, vomiting, and diarrhea 3/12/2021    Premature osteoporosis     Sickle cell anemia (HCC)     Snoring     Negative sleep study.       Social History     Tobacco Use    Smoking status: Never    Smokeless tobacco: Never   Vaping Use    Vaping Use: Never used   Substance Use Topics    Alcohol use: Yes     Comment: socially.     Drug use: Never       Family Status   Relation Name Status    Mo  Alive    Fa  Alive    Bro  Alive    MGMo      MGFa      PGMo  Alive    PGFa  Alive    Bro  Alive    Bro  Alive    MUnc  Alive     Family History   Problem Relation Age of Onset    No Known Problems Mother     No Known Problems Father     No Known Problems Brother     Diabetes Maternal Grandmother         Type II    Hypertension Maternal Grandmother     Diabetes Paternal Grandmother         Type II    Hypertension Paternal Grandmother     No Known Problems Brother     GI Disease Brother         GI Ulcers    Other Maternal Uncle         Sickle Cell        Review of Systems   Constitutional:  Negative for chills, fever and malaise/fatigue.   Respiratory:  Positive for shortness of breath. Negative for cough.    Cardiovascular:  Negative for chest pain.   Musculoskeletal:  Positive for myalgias.   Neurological:  Negative for dizziness, weakness and headaches.       Exam:  /70 (BP Location: Left arm, Patient Position: Sitting, BP Cuff Size: Adult)   Pulse 85   Temp 36.7 °C (98.1 °F) (Temporal)   Resp 20   Ht 1.651 m (5' 5\")   Wt 54.3 kg (119 lb 9.6 oz)   SpO2 93%   Physical Exam  Vitals reviewed.   Constitutional:       General: She is not in acute distress.     Appearance: Normal appearance. She is normal weight. She is not toxic-appearing.   HENT:      Head: Normocephalic.      Right Ear: Tympanic membrane, ear canal and external ear normal.      Left Ear: " Tympanic membrane, ear canal and external ear normal.      Nose: No nasal tenderness or mucosal edema.      Mouth/Throat:      Mouth: Mucous membranes are moist.      Tonsils: No tonsillar exudate. 1+ on the right. 1+ on the left.   Eyes:      General:         Right eye: No discharge.         Left eye: No discharge.      Extraocular Movements: Extraocular movements intact.      Conjunctiva/sclera: Conjunctivae normal.      Pupils: Pupils are equal, round, and reactive to light.   Cardiovascular:      Rate and Rhythm: Normal rate and regular rhythm.      Pulses: Normal pulses.      Heart sounds: Normal heart sounds. No murmur heard.  Pulmonary:      Effort: Pulmonary effort is normal. No respiratory distress.      Breath sounds: Normal breath sounds.      Comments: Right-sided chest wall pain with palpation  Chest:      Chest wall: Tenderness present.   Musculoskeletal:         General: No swelling, tenderness, deformity or signs of injury. Normal range of motion.      Cervical back: Normal range of motion and neck supple. No tenderness.   Skin:     General: Skin is warm and dry.      Findings: No bruising, erythema, lesion or rash.   Neurological:      General: No focal deficit present.      Mental Status: She is alert.      Sensory: No sensory deficit.      Motor: No weakness.      Coordination: Coordination normal.      Gait: Gait normal.   Psychiatric:         Mood and Affect: Mood normal.         Thought Content: Thought content normal.         Assessment/Plan:  1. Acute chest wall pain  - [id number] (WITH 1-VIEW CXR) RIGHT; Future    Based on physical exam along with review of systems I did go ahead and order a chest x-ray.  Lung sounds clear to auscultation.  Patient does have a noted bruise under her left breast with slight pain noted with palpation.  X-ray negative for anything significant at this time.  Encouraged over-the-counter Motrin and Tylenol, ice for any ongoing pain.  Patient is aware of  the plan of care and agreeable.    Supportive care, differential diagnoses, and indications for immediate follow-up discussed with patient.   Pathogenesis of diagnosis discussed including typical length and natural progression.   Instructed to return to clinic or nearest emergency department for any change in condition, further concerns, or worsening of symptoms.  Patient states understanding of the plan of care and discharge instructions.  Instructed to make an appointment, for follow up, with primary care provider.    Please note that this dictation was created using voice recognition software. I have made every reasonable attempt to correct obvious errors, but I expect that there are errors of grammar and possibly content that I did not discover before finalizing the note.      Roxanna Currie APRN

## 2024-02-29 ENCOUNTER — APPOINTMENT (OUTPATIENT)
Dept: MEDICAL GROUP | Facility: MEDICAL CENTER | Age: 29
End: 2024-02-29
Payer: COMMERCIAL

## 2024-03-06 ENCOUNTER — APPOINTMENT (OUTPATIENT)
Dept: MEDICAL GROUP | Facility: MEDICAL CENTER | Age: 29
End: 2024-03-06
Payer: COMMERCIAL

## 2024-03-13 SDOH — HEALTH STABILITY: PHYSICAL HEALTH: ON AVERAGE, HOW MANY MINUTES DO YOU ENGAGE IN EXERCISE AT THIS LEVEL?: 50 MIN

## 2024-03-13 SDOH — ECONOMIC STABILITY: HOUSING INSECURITY: IN THE LAST 12 MONTHS, HOW MANY PLACES HAVE YOU LIVED?: 2

## 2024-03-13 SDOH — HEALTH STABILITY: PHYSICAL HEALTH: ON AVERAGE, HOW MANY DAYS PER WEEK DO YOU ENGAGE IN MODERATE TO STRENUOUS EXERCISE (LIKE A BRISK WALK)?: 4 DAYS

## 2024-03-13 SDOH — ECONOMIC STABILITY: INCOME INSECURITY: IN THE LAST 12 MONTHS, WAS THERE A TIME WHEN YOU WERE NOT ABLE TO PAY THE MORTGAGE OR RENT ON TIME?: NO

## 2024-03-13 SDOH — ECONOMIC STABILITY: FOOD INSECURITY: WITHIN THE PAST 12 MONTHS, THE FOOD YOU BOUGHT JUST DIDN'T LAST AND YOU DIDN'T HAVE MONEY TO GET MORE.: NEVER TRUE

## 2024-03-13 SDOH — ECONOMIC STABILITY: FOOD INSECURITY: WITHIN THE PAST 12 MONTHS, YOU WORRIED THAT YOUR FOOD WOULD RUN OUT BEFORE YOU GOT MONEY TO BUY MORE.: NEVER TRUE

## 2024-03-13 SDOH — ECONOMIC STABILITY: INCOME INSECURITY: HOW HARD IS IT FOR YOU TO PAY FOR THE VERY BASICS LIKE FOOD, HOUSING, MEDICAL CARE, AND HEATING?: NOT HARD AT ALL

## 2024-03-13 SDOH — HEALTH STABILITY: MENTAL HEALTH
STRESS IS WHEN SOMEONE FEELS TENSE, NERVOUS, ANXIOUS, OR CAN'T SLEEP AT NIGHT BECAUSE THEIR MIND IS TROUBLED. HOW STRESSED ARE YOU?: NOT AT ALL

## 2024-03-13 ASSESSMENT — SOCIAL DETERMINANTS OF HEALTH (SDOH)
IN A TYPICAL WEEK, HOW MANY TIMES DO YOU TALK ON THE PHONE WITH FAMILY, FRIENDS, OR NEIGHBORS?: THREE TIMES A WEEK
ARE YOU MARRIED, WIDOWED, DIVORCED, SEPARATED, NEVER MARRIED, OR LIVING WITH A PARTNER?: NEVER MARRIED
HOW OFTEN DO YOU ATTENT MEETINGS OF THE CLUB OR ORGANIZATION YOU BELONG TO?: NEVER
HOW OFTEN DO YOU GET TOGETHER WITH FRIENDS OR RELATIVES?: TWICE A WEEK
DO YOU BELONG TO ANY CLUBS OR ORGANIZATIONS SUCH AS CHURCH GROUPS UNIONS, FRATERNAL OR ATHLETIC GROUPS, OR SCHOOL GROUPS?: NO
HOW OFTEN DO YOU ATTEND CHURCH OR RELIGIOUS SERVICES?: 1 TO 4 TIMES PER YEAR
HOW HARD IS IT FOR YOU TO PAY FOR THE VERY BASICS LIKE FOOD, HOUSING, MEDICAL CARE, AND HEATING?: NOT HARD AT ALL
HOW OFTEN DO YOU HAVE A DRINK CONTAINING ALCOHOL: 2-4 TIMES A MONTH
HOW OFTEN DO YOU HAVE SIX OR MORE DRINKS ON ONE OCCASION: LESS THAN MONTHLY
IN A TYPICAL WEEK, HOW MANY TIMES DO YOU TALK ON THE PHONE WITH FAMILY, FRIENDS, OR NEIGHBORS?: THREE TIMES A WEEK
HOW OFTEN DO YOU GET TOGETHER WITH FRIENDS OR RELATIVES?: TWICE A WEEK
HOW OFTEN DO YOU ATTEND CHURCH OR RELIGIOUS SERVICES?: 1 TO 4 TIMES PER YEAR
HOW MANY DRINKS CONTAINING ALCOHOL DO YOU HAVE ON A TYPICAL DAY WHEN YOU ARE DRINKING: 1 OR 2
HOW OFTEN DO YOU ATTENT MEETINGS OF THE CLUB OR ORGANIZATION YOU BELONG TO?: NEVER
DO YOU BELONG TO ANY CLUBS OR ORGANIZATIONS SUCH AS CHURCH GROUPS UNIONS, FRATERNAL OR ATHLETIC GROUPS, OR SCHOOL GROUPS?: NO
WITHIN THE PAST 12 MONTHS, YOU WORRIED THAT YOUR FOOD WOULD RUN OUT BEFORE YOU GOT THE MONEY TO BUY MORE: NEVER TRUE
ARE YOU MARRIED, WIDOWED, DIVORCED, SEPARATED, NEVER MARRIED, OR LIVING WITH A PARTNER?: NEVER MARRIED

## 2024-03-13 ASSESSMENT — LIFESTYLE VARIABLES
AUDIT-C TOTAL SCORE: 3
HOW OFTEN DO YOU HAVE A DRINK CONTAINING ALCOHOL: 2-4 TIMES A MONTH
HOW MANY STANDARD DRINKS CONTAINING ALCOHOL DO YOU HAVE ON A TYPICAL DAY: 1 OR 2
HOW OFTEN DO YOU HAVE SIX OR MORE DRINKS ON ONE OCCASION: LESS THAN MONTHLY
SKIP TO QUESTIONS 9-10: 0

## 2024-03-14 ENCOUNTER — OFFICE VISIT (OUTPATIENT)
Dept: MEDICAL GROUP | Facility: MEDICAL CENTER | Age: 29
End: 2024-03-14
Payer: COMMERCIAL

## 2024-03-14 ENCOUNTER — HOSPITAL ENCOUNTER (OUTPATIENT)
Dept: LAB | Facility: MEDICAL CENTER | Age: 29
End: 2024-03-14
Attending: NURSE PRACTITIONER
Payer: COMMERCIAL

## 2024-03-14 VITALS
HEIGHT: 65 IN | OXYGEN SATURATION: 95 % | TEMPERATURE: 97 F | WEIGHT: 117 LBS | DIASTOLIC BLOOD PRESSURE: 60 MMHG | HEART RATE: 73 BPM | BODY MASS INDEX: 19.49 KG/M2 | SYSTOLIC BLOOD PRESSURE: 100 MMHG

## 2024-03-14 DIAGNOSIS — Z00.00 ANNUAL PHYSICAL EXAM: ICD-10-CM

## 2024-03-14 DIAGNOSIS — E55.9 VITAMIN D DEFICIENCY: ICD-10-CM

## 2024-03-14 DIAGNOSIS — F33.0 MILD EPISODE OF RECURRENT MAJOR DEPRESSIVE DISORDER (HCC): ICD-10-CM

## 2024-03-14 DIAGNOSIS — M81.8 PREMATURE OSTEOPOROSIS: ICD-10-CM

## 2024-03-14 DIAGNOSIS — D57.1 HB-SS DISEASE WITHOUT CRISIS (HCC): ICD-10-CM

## 2024-03-14 DIAGNOSIS — J45.20 MILD INTERMITTENT ASTHMA WITHOUT COMPLICATION: ICD-10-CM

## 2024-03-14 DIAGNOSIS — Z23 NEED FOR VACCINATION: ICD-10-CM

## 2024-03-14 DIAGNOSIS — Z97.5 NEXPLANON IN PLACE: ICD-10-CM

## 2024-03-14 LAB
25(OH)D3 SERPL-MCNC: 24 NG/ML (ref 30–100)
ALBUMIN SERPL BCP-MCNC: 4.8 G/DL (ref 3.2–4.9)
ALBUMIN/GLOB SERPL: 1.4 G/DL
ALP SERPL-CCNC: 58 U/L (ref 30–99)
ALT SERPL-CCNC: 12 U/L (ref 2–50)
ANION GAP SERPL CALC-SCNC: 13 MMOL/L (ref 7–16)
ANISOCYTOSIS BLD QL SMEAR: ABNORMAL
AST SERPL-CCNC: 49 U/L (ref 12–45)
BASOPHILS # BLD AUTO: 1 % (ref 0–1.8)
BASOPHILS # BLD: 0.09 K/UL (ref 0–0.12)
BILIRUB SERPL-MCNC: 4 MG/DL (ref 0.1–1.5)
BUN SERPL-MCNC: 4 MG/DL (ref 8–22)
CALCIUM ALBUM COR SERPL-MCNC: 8.6 MG/DL (ref 8.5–10.5)
CALCIUM SERPL-MCNC: 9.2 MG/DL (ref 8.4–10.2)
CHLORIDE SERPL-SCNC: 104 MMOL/L (ref 96–112)
CO2 SERPL-SCNC: 23 MMOL/L (ref 20–33)
CREAT SERPL-MCNC: 0.24 MG/DL (ref 0.5–1.4)
EOSINOPHIL # BLD AUTO: 0 K/UL (ref 0–0.51)
EOSINOPHIL NFR BLD: 0 % (ref 0–6.9)
ERYTHROCYTE [DISTWIDTH] IN BLOOD BY AUTOMATED COUNT: 75.9 FL (ref 35.9–50)
FASTING STATUS PATIENT QL REPORTED: NORMAL
FERRITIN SERPL-MCNC: 60.3 NG/ML (ref 10–291)
FOLATE SERPL-MCNC: 6.8 NG/ML
GFR SERPLBLD CREATININE-BSD FMLA CKD-EPI: 156 ML/MIN/1.73 M 2
GLOBULIN SER CALC-MCNC: 3.5 G/DL (ref 1.9–3.5)
GLUCOSE SERPL-MCNC: 100 MG/DL (ref 65–99)
HCT VFR BLD AUTO: 21.3 % (ref 37–47)
HGB BLD-MCNC: 7.4 G/DL (ref 12–16)
IRON SATN MFR SERPL: 37 % (ref 15–55)
IRON SERPL-MCNC: 101 UG/DL (ref 40–170)
LYMPHOCYTES # BLD AUTO: 2.34 K/UL (ref 1–4.8)
LYMPHOCYTES NFR BLD: 26 % (ref 22–41)
MACROCYTES BLD QL SMEAR: ABNORMAL
MANUAL DIFF BLD: NORMAL
MCH RBC QN AUTO: 30.1 PG (ref 27–33)
MCHC RBC AUTO-ENTMCNC: 34.7 G/DL (ref 32.2–35.5)
MCV RBC AUTO: 86.6 FL (ref 81.4–97.8)
MONOCYTES # BLD AUTO: 0.45 K/UL (ref 0–0.85)
MONOCYTES NFR BLD AUTO: 5 % (ref 0–13.4)
NEUTROPHILS # BLD AUTO: 6.12 K/UL (ref 1.82–7.42)
NEUTROPHILS NFR BLD: 67 % (ref 44–72)
NEUTS BAND NFR BLD MANUAL: 1 % (ref 0–10)
NRBC # BLD AUTO: 0.1 K/UL
NRBC BLD-RTO: 1.1 /100 WBC (ref 0–0.2)
OVALOCYTES BLD QL SMEAR: NORMAL
PLATELET # BLD AUTO: 578 K/UL (ref 164–446)
PLATELET BLD QL SMEAR: NORMAL
PMV BLD AUTO: 9.7 FL (ref 9–12.9)
POIKILOCYTOSIS BLD QL SMEAR: NORMAL
POTASSIUM SERPL-SCNC: 4.1 MMOL/L (ref 3.6–5.5)
PROT SERPL-MCNC: 8.3 G/DL (ref 6–8.2)
RBC # BLD AUTO: 2.46 M/UL (ref 4.2–5.4)
RBC BLD AUTO: PRESENT
SICKLE CELLS BLD QL SMEAR: NORMAL
SODIUM SERPL-SCNC: 140 MMOL/L (ref 135–145)
TARGETS BLD QL SMEAR: NORMAL
TIBC SERPL-MCNC: 275 UG/DL (ref 250–450)
TSH SERPL DL<=0.005 MIU/L-ACNC: 1.71 UIU/ML (ref 0.38–5.33)
UIBC SERPL-MCNC: 174 UG/DL (ref 110–370)
WBC # BLD AUTO: 9 K/UL (ref 4.8–10.8)

## 2024-03-14 PROCEDURE — 82306 VITAMIN D 25 HYDROXY: CPT

## 2024-03-14 PROCEDURE — 82746 ASSAY OF FOLIC ACID SERUM: CPT

## 2024-03-14 PROCEDURE — 83540 ASSAY OF IRON: CPT

## 2024-03-14 PROCEDURE — 99395 PREV VISIT EST AGE 18-39: CPT | Mod: 25 | Performed by: NURSE PRACTITIONER

## 2024-03-14 PROCEDURE — 82728 ASSAY OF FERRITIN: CPT

## 2024-03-14 PROCEDURE — 84443 ASSAY THYROID STIM HORMONE: CPT

## 2024-03-14 PROCEDURE — 83550 IRON BINDING TEST: CPT

## 2024-03-14 PROCEDURE — 90471 IMMUNIZATION ADMIN: CPT | Performed by: NURSE PRACTITIONER

## 2024-03-14 PROCEDURE — 36415 COLL VENOUS BLD VENIPUNCTURE: CPT

## 2024-03-14 PROCEDURE — 3078F DIAST BP <80 MM HG: CPT | Performed by: NURSE PRACTITIONER

## 2024-03-14 PROCEDURE — 80053 COMPREHEN METABOLIC PANEL: CPT

## 2024-03-14 PROCEDURE — 85007 BL SMEAR W/DIFF WBC COUNT: CPT

## 2024-03-14 PROCEDURE — 85027 COMPLETE CBC AUTOMATED: CPT

## 2024-03-14 PROCEDURE — 90621 MENB-FHBP VACC 2/3 DOSE IM: CPT | Performed by: NURSE PRACTITIONER

## 2024-03-14 PROCEDURE — 3074F SYST BP LT 130 MM HG: CPT | Performed by: NURSE PRACTITIONER

## 2024-03-14 ASSESSMENT — PATIENT HEALTH QUESTIONNAIRE - PHQ9
1. LITTLE INTEREST OR PLEASURE IN DOING THINGS: NOT AT ALL
SUM OF ALL RESPONSES TO PHQ9 QUESTIONS 1 AND 2: 0
9. THOUGHTS THAT YOU WOULD BE BETTER OFF DEAD, OR OF HURTING YOURSELF: NOT AT ALL
2. FEELING DOWN, DEPRESSED, IRRITABLE, OR HOPELESS: NOT AT ALL
4. FEELING TIRED OR HAVING LITTLE ENERGY: NOT AT ALL
8. MOVING OR SPEAKING SO SLOWLY THAT OTHER PEOPLE COULD HAVE NOTICED. OR THE OPPOSITE, BEING SO FIGETY OR RESTLESS THAT YOU HAVE BEEN MOVING AROUND A LOT MORE THAN USUAL: NOT AT ALL
6. FEELING BAD ABOUT YOURSELF - OR THAT YOU ARE A FAILURE OR HAVE LET YOURSELF OR YOUR FAMILY DOWN: NOT AL ALL
7. TROUBLE CONCENTRATING ON THINGS, SUCH AS READING THE NEWSPAPER OR WATCHING TELEVISION: SEVERAL DAYS
SUM OF ALL RESPONSES TO PHQ QUESTIONS 1-9: 1
5. POOR APPETITE OR OVEREATING: NOT AT ALL
3. TROUBLE FALLING OR STAYING ASLEEP OR SLEEPING TOO MUCH: NOT AT ALL

## 2024-03-14 ASSESSMENT — FIBROSIS 4 INDEX: FIB4 SCORE: 0.85

## 2024-03-14 NOTE — PROGRESS NOTES
Subjective:     History of Present Illness  The patient presents for an annual exam.    Her Nexplanon has  and she needs a new one. She is not on birth control currently. She is not sexually active currently. She denies any pain. She was getting her period once every 2 months, but currently she is getting it once every 2 weeks.    She stopped taking Celexa accidentally. She did not have any withdrawal symptoms when she was off of it however reports that she was sick for 2 days around when she stopped it. She has been fine since then. She had a break up in 2023. 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 She is going outside more. She denies any depression symptoms.   Depression Screening    Little interest or pleasure in doing things?   Not at all  Feeling down, depressed , or hopeless?  Not at all  Trouble falling or staying asleep, or sleeping too much?   Not at all  Feeling tired or having little energy?   Not at all  Poor appetite or overeating?   Not at all  Feeling bad about yourself - or that you are a failure or have let yourself or your family down?  Not al all  Trouble concentrating on things, such as reading the newspaper or watching television?  Several days  Moving or speaking so slowly that other people could have noticed.  Or the opposite - being so fidgety or restless that you have been moving around a lot more than usual?   Not at all  Thoughts that you would be better off dead, or of hurting yourself?   Not at all  Patient Health Questionnaire Score:  1      She is still seeing hematology. Her last appointment was in 2023. She is due again in 2024. She is not taking folic acid. She is not taking vitamin D. She is not taking calcium. She uses albuterol as needed, only needing this when she is sick. She has not seen pulmonology since she was a kid. Her breathing has been the same. She takes Flonase and Zyrtec occasionally. She takes Nexium as  "needed. She denies headaches, chest pains, or shortness of breath. She denies feeling more tired than normal. She has swelling in her feet and ankles after work. She has not tried compression socks.     Denies any sickle cell crises.       Current medicines (including changes today)  Current Outpatient Medications   Medication Sig Dispense Refill    cetirizine (ZYRTEC) 10 MG Tab Take 10 mg by mouth every day.      albuterol 108 (90 Base) MCG/ACT Aero Soln inhalation aerosol Inhale 2 Puffs every 6 hours as needed.      esomeprazole (NEXIUM) 20 MG capsule Take 1 Capsule by mouth every morning before breakfast. 90 Capsule 3    etonogestrel (NEXPLANON) 68 MG Implant implant Inject 1 Each under the skin one time.       No current facility-administered medications for this visit.     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    ROS  No chest pain, no shortness of breath, no abdominal pain  Positive ROS as per HPI.  All other systems reviewed and are negative.     Objective:     /60   Pulse 73   Temp 36.1 °C (97 °F) (Temporal)   Ht 1.651 m (5' 5\")   Wt 53.1 kg (117 lb)   SpO2 95%  Body mass index is 19.47 kg/m².   Physical Exam    Constitutional: Alert, no distress.  Skin: Warm, dry, good turgor, no rashes in visible areas.  Eye: Equal, round and reactive, conjunctiva clear, lids normal.  ENMT: Lips without lesions, good dentition, oropharynx clear.  Neck: Trachea midline, no masses, no thyromegaly. No cervical or supraclavicular lymphadenopathy  Respiratory: Unlabored respiratory effort, lungs clear to auscultation, no wheezes, no ronchi.  Cardiovascular: Normal S1, S2, no murmur, no edema.  Psych: Alert and oriented x3, normal affect and mood.      Results  Imaging  X-ray from urgent care visit was reviewed with the " patient.    Testing  Echocardiogram from 2023 was reviewed with the patient.        Assessment and Plan:   The following treatment plan was discussed    Assessment & Plan    1. Annual physical exam  Annual labs ordered, health maintenance reviewed and updated  She is due for meningitis B vaccine.She is overdue for repeat bone density scan. She will start taking folic acid twice a day. She will start taking vitamin D and calcium   - CBC WITH DIFFERENTIAL; Future  - Comp Metabolic Panel; Future  - TSH WITH REFLEX TO FT4; Future  - VITAMIN D,25 HYDROXY (DEFICIENCY); Future  - FOLATE; Future  - IRON/TOTAL IRON BIND; Future  - FERRITIN; Future    2. Nexplanon in place  She was provided with a list of providers who do Nexplanon removal and replacement.    3. Premature osteoporosis  Unstable  Restart daily vit D and Calcium  - DS-BONE DENSITY STUDY (DEXA); Future    4. Vitamin D deficiency  - VITAMIN D,25 HYDROXY (DEFICIENCY); Future    5. 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 Mild intermittent asthma without complication  Stable on albuterol    7. Mild episode of recurrent major depressive disorder (HCC)  Stable off citalopram    8. Need for vaccination  - Meningococcal (IM) Group B    Follow up for results review      The MA is performing the above orders under the direction of Dr. Craft    Please note that this dictation was created using voice recognition software. I have made every reasonable attempt to correct obvious errors, but I expect that there are errors of grammar and possibly content that I did not discover before finalizing the note.      Attestation      Verbal consent was acquired by the patient to use [employer] ambient listening note generation during this visit Yes

## 2024-03-22 ENCOUNTER — OFFICE VISIT (OUTPATIENT)
Dept: MEDICAL GROUP | Facility: LAB | Age: 29
End: 2024-03-22
Payer: COMMERCIAL

## 2024-03-22 VITALS
DIASTOLIC BLOOD PRESSURE: 74 MMHG | HEART RATE: 81 BPM | SYSTOLIC BLOOD PRESSURE: 100 MMHG | OXYGEN SATURATION: 97 % | WEIGHT: 120 LBS | TEMPERATURE: 98.1 F | RESPIRATION RATE: 12 BRPM | BODY MASS INDEX: 19.99 KG/M2 | HEIGHT: 65 IN

## 2024-03-22 DIAGNOSIS — M81.6 LOCALIZED OSTEOPOROSIS WITHOUT CURRENT PATHOLOGICAL FRACTURE: ICD-10-CM

## 2024-03-22 DIAGNOSIS — Z30.8 ENCOUNTER FOR OTHER CONTRACEPTIVE MANAGEMENT: ICD-10-CM

## 2024-03-22 ASSESSMENT — FIBROSIS 4 INDEX: FIB4 SCORE: 0.69

## 2024-03-22 NOTE — PROGRESS NOTES
"Subjective:     Chief Complaint   Patient presents with    Follow-Up     Birth Control         HPI:   Alicia presents today with Nexplanon ordering, discussion of swapping out. Has nexplanon. Period is still present, right now, every 2 weeks or so. This will be the first replacement. Three years was up in October.     Right side, left hand dominant.   Not sexually active right now.           Current Outpatient Medications Ordered in Epic   Medication Sig Dispense Refill    cetirizine (ZYRTEC) 10 MG Tab Take 10 mg by mouth every day.      albuterol 108 (90 Base) MCG/ACT Aero Soln inhalation aerosol Inhale 2 Puffs every 6 hours as needed.      esomeprazole (NEXIUM) 20 MG capsule Take 1 Capsule by mouth every morning before breakfast. 90 Capsule 3    etonogestrel (NEXPLANON) 68 MG Implant implant Inject 1 Each under the skin one time.       No current [facility]-ordered facility-administered medications on file.         ROS:  Gen: no fevers/chills, no changes in weight  Eyes: no changes in vision  ENT: no sore throat, no hearing loss, no bloody nose  Pulm: no sob, no cough  CV: no chest pain, no palpitations  GI: no nausea/vomiting, no diarrhea  : no dysuria  MSk: no myalgias  Skin: no rash  Neuro: no headaches, no numbness/tingling  Heme/Lymph: no easy bruising      Objective:     Exam:  /74 (BP Location: Right arm, Patient Position: Sitting, BP Cuff Size: Adult)   Pulse 81   Temp 36.7 °C (98.1 °F)   Resp 12   Ht 1.651 m (5' 5\")   Wt 54.4 kg (120 lb)   SpO2 97%   BMI 19.97 kg/m²  Body mass index is 19.97 kg/m².    Gen: AAOx3, NAD, well appearing  HEENT: NCAT, EOMI, Nares patent, Mucosa moist  Resp: Normal chest wall rise and fall, not SOB, no tachypnea  Skin: no rash or abnormality of visible skin.   Psych: normal speech, not slurred, good insight, affect full  MSK: Moves all four limbs equally and normally, gait normal        Assessment & Plan:     28 y.o. female with the following -     1. Encounter for " other contraceptive management  Guzman presents for ordering a Nexplanon through insurance and then replacing in clinic.  Paperwork filled out today.  Will get this sent in and then when we receive her device will let her know that she can call and schedule to get the swapped out.  We did discuss using backup contraception at this point although she still within 4 years and so does not likely have some contraceptive benefit just with the Nexplanon.    2. Localized osteoporosis without current pathological fracture  She has a history of osteoporosis and has since she was very young.  She has also been treated with Fosamax already in the past.  She is only 28.  Would really like her seen by endocrinology for further discussion about treatment options and fracture prevention.  - Referral to Endocrinology            No follow-ups on file.    Please note that this dictation was created using voice recognition software. I have made every reasonable attempt to correct obvious errors, but I expect that there are errors of grammar and possibly content that I did not discover before finalizing the note.

## 2024-04-18 ENCOUNTER — APPOINTMENT (OUTPATIENT)
Dept: RADIOLOGY | Facility: MEDICAL CENTER | Age: 29
End: 2024-04-18
Attending: NURSE PRACTITIONER
Payer: COMMERCIAL

## 2024-05-14 ENCOUNTER — APPOINTMENT (OUTPATIENT)
Dept: RADIOLOGY | Facility: MEDICAL CENTER | Age: 29
End: 2024-05-14
Attending: NURSE PRACTITIONER
Payer: COMMERCIAL

## 2024-06-20 ENCOUNTER — APPOINTMENT (OUTPATIENT)
Dept: RADIOLOGY | Facility: MEDICAL CENTER | Age: 29
End: 2024-06-20
Attending: NURSE PRACTITIONER
Payer: COMMERCIAL

## 2024-07-10 ENCOUNTER — OFFICE VISIT (OUTPATIENT)
Dept: MEDICAL GROUP | Facility: MEDICAL CENTER | Age: 29
End: 2024-07-10
Payer: COMMERCIAL

## 2024-07-10 ENCOUNTER — HOSPITAL ENCOUNTER (OUTPATIENT)
Dept: RADIOLOGY | Facility: MEDICAL CENTER | Age: 29
End: 2024-07-10
Attending: NURSE PRACTITIONER
Payer: COMMERCIAL

## 2024-07-10 ENCOUNTER — APPOINTMENT (OUTPATIENT)
Dept: MEDICAL GROUP | Facility: MEDICAL CENTER | Age: 29
End: 2024-07-10
Payer: COMMERCIAL

## 2024-07-10 VITALS
SYSTOLIC BLOOD PRESSURE: 100 MMHG | HEIGHT: 65 IN | BODY MASS INDEX: 19.66 KG/M2 | WEIGHT: 118 LBS | HEART RATE: 81 BPM | OXYGEN SATURATION: 96 % | DIASTOLIC BLOOD PRESSURE: 62 MMHG | TEMPERATURE: 98.1 F

## 2024-07-10 DIAGNOSIS — M25.561 ACUTE PAIN OF RIGHT KNEE: ICD-10-CM

## 2024-07-10 DIAGNOSIS — R53.83 OTHER FATIGUE: ICD-10-CM

## 2024-07-10 DIAGNOSIS — D64.9 ANEMIA, UNSPECIFIED TYPE: Chronic | ICD-10-CM

## 2024-07-10 DIAGNOSIS — M85.9 LOW BONE DENSITY FOR AGE: ICD-10-CM

## 2024-07-10 DIAGNOSIS — D57.1 HB-SS DISEASE WITHOUT CRISIS (HCC): ICD-10-CM

## 2024-07-10 DIAGNOSIS — M81.8 PREMATURE OSTEOPOROSIS: ICD-10-CM

## 2024-07-10 PROBLEM — N95.8 OTHER SPECIFIED MENOPAUSAL AND PERIMENOPAUSAL DISORDERS: Status: RESOLVED | Noted: 2024-07-10 | Resolved: 2024-07-10

## 2024-07-10 PROBLEM — M81.0 SENILE OSTEOPOROSIS: Status: ACTIVE | Noted: 2024-07-10

## 2024-07-10 PROBLEM — E21.3 HYPERPARATHYROIDISM (HCC): Status: ACTIVE | Noted: 2024-07-10

## 2024-07-10 PROBLEM — E21.3 HYPERPARATHYROIDISM (HCC): Status: RESOLVED | Noted: 2024-07-10 | Resolved: 2024-07-10

## 2024-07-10 PROBLEM — E03.9 HYPOTHYROIDISM: Status: RESOLVED | Noted: 2024-07-10 | Resolved: 2024-07-10

## 2024-07-10 PROBLEM — N95.8 OTHER SPECIFIED MENOPAUSAL AND PERIMENOPAUSAL DISORDERS: Status: ACTIVE | Noted: 2024-07-10

## 2024-07-10 PROBLEM — E03.9 HYPOTHYROIDISM: Status: ACTIVE | Noted: 2024-07-10

## 2024-07-10 PROBLEM — M81.0 SENILE OSTEOPOROSIS: Status: RESOLVED | Noted: 2024-07-10 | Resolved: 2024-07-10

## 2024-07-10 PROCEDURE — 3078F DIAST BP <80 MM HG: CPT | Performed by: NURSE PRACTITIONER

## 2024-07-10 PROCEDURE — 3074F SYST BP LT 130 MM HG: CPT | Performed by: NURSE PRACTITIONER

## 2024-07-10 PROCEDURE — 73562 X-RAY EXAM OF KNEE 3: CPT | Mod: RT

## 2024-07-10 PROCEDURE — 99214 OFFICE O/P EST MOD 30 MIN: CPT | Performed by: NURSE PRACTITIONER

## 2024-07-10 RX ORDER — DICLOFENAC SODIUM 75 MG/1
75 TABLET, DELAYED RELEASE ORAL 2 TIMES DAILY
Qty: 28 TABLET | Refills: 1 | Status: SHIPPED | OUTPATIENT
Start: 2024-07-10

## 2024-07-10 ASSESSMENT — FIBROSIS 4 INDEX: FIB4 SCORE: 0.71

## 2024-07-11 ENCOUNTER — HOSPITAL ENCOUNTER (OUTPATIENT)
Dept: LAB | Facility: MEDICAL CENTER | Age: 29
End: 2024-07-11
Attending: NURSE PRACTITIONER
Payer: COMMERCIAL

## 2024-07-11 DIAGNOSIS — R53.83 OTHER FATIGUE: ICD-10-CM

## 2024-07-11 DIAGNOSIS — D64.9 ANEMIA, UNSPECIFIED TYPE: Chronic | ICD-10-CM

## 2024-07-11 LAB
ANISOCYTOSIS BLD QL SMEAR: ABNORMAL
BASOPHILS # BLD AUTO: 0 % (ref 0–1.8)
BASOPHILS # BLD: 0 K/UL (ref 0–0.12)
EOSINOPHIL # BLD AUTO: 0.24 K/UL (ref 0–0.51)
EOSINOPHIL NFR BLD: 1.7 % (ref 0–6.9)
ERYTHROCYTE [DISTWIDTH] IN BLOOD BY AUTOMATED COUNT: 75.3 FL (ref 35.9–50)
HCT VFR BLD AUTO: 20.6 % (ref 37–47)
HGB BLD-MCNC: 7.4 G/DL (ref 12–16)
HOWELL-JOLLY BOD BLD QL SMEAR: NORMAL
LYMPHOCYTES # BLD AUTO: 3 K/UL (ref 1–4.8)
LYMPHOCYTES NFR BLD: 21.4 % (ref 22–41)
MANUAL DIFF BLD: NORMAL
MCH RBC QN AUTO: 31.8 PG (ref 27–33)
MCHC RBC AUTO-ENTMCNC: 35.9 G/DL (ref 32.2–35.5)
MCV RBC AUTO: 88.4 FL (ref 81.4–97.8)
MONOCYTES # BLD AUTO: 0.84 K/UL (ref 0–0.85)
MONOCYTES NFR BLD AUTO: 6 % (ref 0–13.4)
MORPHOLOGY BLD-IMP: NORMAL
NEUTROPHILS # BLD AUTO: 9.93 K/UL (ref 1.82–7.42)
NEUTROPHILS NFR BLD: 70.9 % (ref 44–72)
NRBC # BLD AUTO: 0.14 K/UL
NRBC BLD-RTO: 1 /100 WBC (ref 0–0.2)
PLATELET # BLD AUTO: 531 K/UL (ref 164–446)
PLATELET BLD QL SMEAR: NORMAL
PMV BLD AUTO: 10.7 FL (ref 9–12.9)
POIKILOCYTOSIS BLD QL SMEAR: NORMAL
POLYCHROMASIA BLD QL SMEAR: NORMAL
RBC # BLD AUTO: 2.33 M/UL (ref 4.2–5.4)
RBC BLD AUTO: PRESENT
SICKLE CELLS BLD QL SMEAR: NORMAL
T4 FREE SERPL-MCNC: 0.88 NG/DL (ref 0.93–1.7)
TARGETS BLD QL SMEAR: NORMAL
TSH SERPL DL<=0.005 MIU/L-ACNC: 0.64 UIU/ML (ref 0.38–5.33)
VIT B12 SERPL-MCNC: 497 PG/ML (ref 211–911)
WBC # BLD AUTO: 14 K/UL (ref 4.8–10.8)

## 2024-07-11 PROCEDURE — 85007 BL SMEAR W/DIFF WBC COUNT: CPT

## 2024-07-11 PROCEDURE — 85027 COMPLETE CBC AUTOMATED: CPT

## 2024-07-11 PROCEDURE — 36415 COLL VENOUS BLD VENIPUNCTURE: CPT

## 2024-07-11 PROCEDURE — 84439 ASSAY OF FREE THYROXINE: CPT

## 2024-07-11 PROCEDURE — 82607 VITAMIN B-12: CPT

## 2024-07-11 PROCEDURE — 84443 ASSAY THYROID STIM HORMONE: CPT

## 2024-08-13 ENCOUNTER — GYNECOLOGY VISIT (OUTPATIENT)
Dept: OBGYN | Facility: CLINIC | Age: 29
End: 2024-08-13
Payer: COMMERCIAL

## 2024-08-13 VITALS
WEIGHT: 123 LBS | DIASTOLIC BLOOD PRESSURE: 68 MMHG | HEIGHT: 65 IN | BODY MASS INDEX: 20.49 KG/M2 | SYSTOLIC BLOOD PRESSURE: 113 MMHG

## 2024-08-13 DIAGNOSIS — Z30.46 ENCOUNTER FOR REMOVAL AND REINSERTION OF NEXPLANON: ICD-10-CM

## 2024-08-13 LAB
POCT INT CON NEG: NEGATIVE
POCT INT CON POS: POSITIVE
POCT URINE PREGNANCY TEST: NEGATIVE

## 2024-08-13 PROCEDURE — 81025 URINE PREGNANCY TEST: CPT | Performed by: STUDENT IN AN ORGANIZED HEALTH CARE EDUCATION/TRAINING PROGRAM

## 2024-08-13 PROCEDURE — 11983 REMOVE/INSERT DRUG IMPLANT: CPT | Performed by: STUDENT IN AN ORGANIZED HEALTH CARE EDUCATION/TRAINING PROGRAM

## 2024-08-13 ASSESSMENT — ENCOUNTER SYMPTOMS
GASTROINTESTINAL NEGATIVE: 1
RESPIRATORY NEGATIVE: 1
CARDIOVASCULAR NEGATIVE: 1
CONSTITUTIONAL NEGATIVE: 1

## 2024-08-13 ASSESSMENT — FIBROSIS 4 INDEX: FIB4 SCORE: 0.77

## 2024-08-13 NOTE — PROGRESS NOTES
New Gynecological Visit    Alicia Tejada    29 y.o.    Chief complaint    Chief Complaint   Patient presents with    New Patient       HPI    28 y/o female comes in today as a new patient requesting to have her nexplanon replaced. She had her nexplanon inserted 10/20/2020. She states that she has been using condoms for intercourse. Last pap was 3/17/2022 NILM; HPV negative and negaive for gonorrhea/chlamydia.  UPT negative here today in the clinic. Denies any unprotected intercourse. LNMP: 2024        Review of Systems:  Review of Systems   Constitutional: Negative.    Respiratory: Negative.     Cardiovascular: Negative.    Gastrointestinal: Negative.    Skin: Negative.         Past Obstetrical History:          Past Gynecological History:    Last pap: 3/17/44048  H/o abnormal pap: no  Currently in a relationship: yes  Feel safe in your current relationship: yes  Current Sexual Activity: yes  H/o STIs: trichomonas  LMP: Patient's last menstrual period was 2024 (exact date).  Current contraception: Nexplanon    Past Medical History    Past Medical History:   Diagnosis Date    Anemia     Anxiety     Asthma     Inhaler use PRN.    Mixed restrictive and obstructive lung disease (HCC) 2010    Nausea, vomiting, and diarrhea 3/12/2021    Premature osteoporosis     Sickle cell anemia (HCC)     Snoring     Negative sleep study.       Past Surgical History    Past Surgical History:   Procedure Laterality Date    ANUP BY LAPAROSCOPY  1/15/2019    Procedure: laparoscopic cholecystectomy;  Surgeon: Noel Greene M.D.;  Location: SURGERY [facility];  Service: General    TONSILLECTOMY         Family History   Problem Relation Age of Onset    No Known Problems Mother     No Known Problems Father     No Known Problems Brother     No Known Problems Brother     GI Disease Brother         GI Ulcers    Other Maternal Uncle         Sickle Cell     Breast Cancer Paternal Aunt     Diabetes Maternal  "Grandmother         Type II    Hypertension Maternal Grandmother     Diabetes Paternal Grandmother         Type II    Hypertension Paternal Grandmother        Allergies    No Known Allergies    Medications    Current Outpatient Medications   Medication Sig Dispense Refill    diclofenac DR (VOLTAREN) 75 MG Tablet Delayed Response Take 1 Tablet by mouth 2 times a day. 28 Tablet 1    cetirizine (ZYRTEC) 10 MG Tab Take 10 mg by mouth every day.      albuterol 108 (90 Base) MCG/ACT Aero Soln inhalation aerosol Inhale 2 Puffs every 6 hours as needed.      esomeprazole (NEXIUM) 20 MG capsule Take 1 Capsule by mouth every morning before breakfast. 90 Capsule 3    etonogestrel (NEXPLANON) 68 MG Implant implant Inject 1 Each under the skin one time.       Current Facility-Administered Medications   Medication Dose Route Frequency Provider Last Rate Last Admin    etonogestrel (Nexplanon) implant 1 Each  1 Each Subcutaneous Once            Social  Social History     Tobacco Use    Smoking status: Never    Smokeless tobacco: Never   Vaping Use    Vaping status: Never Used   Substance Use Topics    Alcohol use: Yes     Alcohol/week: 1.2 oz     Types: 2 Standard drinks or equivalent per week     Comment: socially.     Drug use: Not Currently     Types: Marijuana     Comment: occasional        OBJECTIVE:    Vitals    /68 (BP Location: Right arm, Patient Position: Sitting, BP Cuff Size: Adult)   Ht 5' 5\"   Wt 123 lb   LMP 07/28/2024 (Exact Date)   BMI 20.47 kg/m²     Physical Exam    Constitutional: The patient is well developed and well nourished.  Psychiatric: Patient is oriented to time place and person.   Skin: No rash observed.  Neck: Appears symmetric. Thyroid normal size  Respiratory: normal effort  Breast: Inspection reveals no asymetry or nipple discharge, no skin thickening, dimpling or erythema.  Palpation demonstrates no masses.  Abdomen: Soft, non-tender.  Extremeties: Legs are symmetric and without " tenderness. There is no edema present. Nexplanon palpated to RUE.       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)    Total bilirubin, elevated    Abnormal bilirubin test    Seasonal allergic rhinitis due to pollen    Asthma    Anemia    Functional asplenia    Low bone density for age    Environmental allergies    Mixed restrictive and obstructive lung disease (HCC)    Premature osteoporosis    History of vertebral fracture    Vitamin D deficiency    Annual physical exam    Difficulty concentrating    Attention deficit hyperactivity disorder (ADHD), combined type    Mild episode of recurrent major depressive disorder (HCC)    Heart murmur    Other constipation    Nexplanon in place    Acute pain of right knee       Alicia Madison Tejada    29 y.o.        1. Encounter for removal and reinsertion of Nexplanon  Please see procedure note for nexplanon removal and replacemen. Nexplanon present to RUE. Pt tolerated the procedure well without any immediate complications.   - POCT Pregnancy  - Consent for all Surgical, Special Diagnostic or Therapeutic Procedures  - etonogestrel (Nexplanon) implant 1 Each          Time spent: 30 minutes        Cherrie Terry P.A.-C.    Obstetrics and Gynecology    :04 AM

## 2024-08-13 NOTE — PROCEDURES
"Nexplanon Removal Procedure Note    Patient presents for removal of Nexplanon, has had in place for 4 years and desires to switch to OCPs.  R/b/a discussed with patient, including pain during placement, bleeding, bruising, and side effects of OCPs.  Procedure discussed at length with patient, including use of local anesthetic.  Implant is in the patient's RIGHT upper arm.      Vitals:    24 0958   BP: 113/68   BP Location: Right arm   Patient Position: Sitting   BP Cuff Size: Adult   Weight: 123 lb   Height: 5' 5\"       The patient was positioned on the examination table with her RIGHT arm flexed at the elbow and externally rotated so that her wrist was parallel to her ear.  The Implanon was easily palpated along the medial aspect of the left upper arm and felt to be superficial.  The skin over the distal end of the implant was cleansed with betadine and 3mL of 1% lidocaine with epinephrine was injected subcutaneously.      An incision was made with scalpel and the end of the implant was visualized.  Using a forcep, the implant was grasped, dissected and removed intact. Minimal blood loss.      Nexplanon Placement Procedure Note    Alicia Madison Tejada  here for Nexplanon insertion.     Patient presents for desired long term reversible contraception.  R/B/A discussed with patient, including pain during placement, bleeding, bruising, and irregular uterine bleeding following placement.  Procedure discussed at length with patient, including use of local anesthetic.  She is LEFT hand dominant.  Consent form signed.    Pregnancy was excluded by negative UPT, intercourse with condoms, and nexplanon in arm    Patient's last menstrual period was 2024 (exact date).      /68 (BP Location: Right arm, Patient Position: Sitting, BP Cuff Size: Adult)   Ht 5' 5\"   Wt 123 lb   LMP 2024 (Exact Date)   BMI 20.47 kg/m²     The patient was positioned on the examination table with her non-dominant " (right) arm flexed at the elbow and externally rotated so that her wrist was parallel to her ear.  The insertion site was identified at the inner side of the non-dominant upper arm about 8-10cm (3-4 inches) above the medial epicondyle of the humerus.  The insertion site was cleansed with betadine and 3mL of 1% lidocaine with epinephrine was injected subcutaneously.      The nexplanon device was removed from the package and the protective covering was removed from the device.  The white colored implant was verified in the device.  The skin was punctured with the device at 30 degrees at the previously identified insertion site.  The applicator was then lowered to the horizontal position and the needle was then inserted to its full length.  The slider was then pulled back fully and the implant was released.  Presence of the implant was verified by both the physician and the patient.  Minimal blood loss.  A bandage was placed over the insertion site and kerlex wrap was applied.  Patient tolerated the procedure well.     Nexplanon implant information was collected and is to be scanned into the patient's electronic medical record.    Cherrie Terry P.A.-C.

## 2024-08-26 ENCOUNTER — TELEPHONE (OUTPATIENT)
Dept: MEDICAL GROUP | Facility: MEDICAL CENTER | Age: 29
End: 2024-08-26
Payer: COMMERCIAL

## 2024-08-26 NOTE — TELEPHONE ENCOUNTER
----- Message from Nurse Practitioner MARQUES Aly sent at 8/21/2024  4:14 PM PDT -----  Please schedule patient for follow up appointment to discuss lab results.   MARQUES Mendoza

## 2024-09-11 ENCOUNTER — APPOINTMENT (OUTPATIENT)
Dept: MEDICAL GROUP | Facility: MEDICAL CENTER | Age: 29
End: 2024-09-11
Payer: COMMERCIAL

## 2024-09-13 ENCOUNTER — APPOINTMENT (OUTPATIENT)
Dept: MEDICAL GROUP | Facility: MEDICAL CENTER | Age: 29
End: 2024-09-13
Payer: COMMERCIAL

## 2024-09-23 ENCOUNTER — HOSPITAL ENCOUNTER (OUTPATIENT)
Dept: RADIOLOGY | Facility: MEDICAL CENTER | Age: 29
End: 2024-09-23
Attending: NURSE PRACTITIONER
Payer: COMMERCIAL

## 2024-09-23 DIAGNOSIS — M81.8 PREMATURE OSTEOPOROSIS: ICD-10-CM

## 2024-09-23 PROCEDURE — 77080 DXA BONE DENSITY AXIAL: CPT

## 2024-09-25 ENCOUNTER — OFFICE VISIT (OUTPATIENT)
Dept: MEDICAL GROUP | Facility: MEDICAL CENTER | Age: 29
End: 2024-09-25
Payer: COMMERCIAL

## 2024-09-25 VITALS
BODY MASS INDEX: 20.16 KG/M2 | TEMPERATURE: 97 F | WEIGHT: 121 LBS | SYSTOLIC BLOOD PRESSURE: 110 MMHG | HEART RATE: 83 BPM | HEIGHT: 65 IN | DIASTOLIC BLOOD PRESSURE: 62 MMHG | OXYGEN SATURATION: 93 %

## 2024-09-25 DIAGNOSIS — Z11.3 SCREEN FOR STD (SEXUALLY TRANSMITTED DISEASE): ICD-10-CM

## 2024-09-25 DIAGNOSIS — M81.8 PREMATURE OSTEOPOROSIS: ICD-10-CM

## 2024-09-25 DIAGNOSIS — D57.1 HB-SS DISEASE WITHOUT CRISIS (HCC): ICD-10-CM

## 2024-09-25 DIAGNOSIS — R79.89 LOW T4: ICD-10-CM

## 2024-09-25 DIAGNOSIS — M85.9 LOW BONE DENSITY FOR AGE: ICD-10-CM

## 2024-09-25 PROCEDURE — 3078F DIAST BP <80 MM HG: CPT | Performed by: NURSE PRACTITIONER

## 2024-09-25 PROCEDURE — 99214 OFFICE O/P EST MOD 30 MIN: CPT | Performed by: NURSE PRACTITIONER

## 2024-09-25 PROCEDURE — 3074F SYST BP LT 130 MM HG: CPT | Performed by: NURSE PRACTITIONER

## 2024-09-25 ASSESSMENT — FIBROSIS 4 INDEX: FIB4 SCORE: 0.77

## 2024-09-26 NOTE — PROGRESS NOTES
Subjective:     History of Present Illness  The patient presents for results review    She reports a history of low normal thyroid levels but is not currently on any medication for it. She has been experiencing fatigue and is planning to schedule appointment to establish care with endocrinology.     She has discontinued the use of Nexium, which she previously took as needed, and has not used it for the past 4 to 5 months.    She recently had her Nexplanon contraceptive device replaced, which will be effective until 07/2027.    She has started taking vitamin D and calcium supplements regularly due to a decrease in her bone density.     She is planning to receive her influenza vaccine at work in the coming weeks.    She also requests testing for sexually transmitted diseases (STDs) due to a new partner, she is not experiencing any symptoms.      Current medicines (including changes today)  Current Outpatient Medications   Medication Sig Dispense Refill    diclofenac DR (VOLTAREN) 75 MG Tablet Delayed Response Take 1 Tablet by mouth 2 times a day. 28 Tablet 1    cetirizine (ZYRTEC) 10 MG Tab Take 10 mg by mouth every day.      albuterol 108 (90 Base) MCG/ACT Aero Soln inhalation aerosol Inhale 2 Puffs every 6 hours as needed.      esomeprazole (NEXIUM) 20 MG capsule Take 1 Capsule by mouth every morning before breakfast. 90 Capsule 3    etonogestrel (NEXPLANON) 68 MG Implant implant Inject 1 Each under the skin one time.       No current facility-administered medications for this visit.     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    ROS   No chest pain, no shortness of breath, no abdominal pain  Positive ROS as per HPI.  All other systems reviewed and are negative.     Objective:     /62   Pulse 83   Temp 36.1 °C (97 °F)  "(Temporal)   Ht 1.651 m (5' 5\")   Wt 54.9 kg (121 lb)   SpO2 93%  Body mass index is 20.14 kg/m².   Physical Exam    Constitutional: Alert, no distress.  Skin: Warm, dry, good turgor, no rashes in visible areas.  Eye: Equal, round and reactive, conjunctiva clear, lids normal.  ENMT: Lips without lesions, good dentition  Respiratory: Unlabored respiratory effort  Psych: Alert and oriented x3, normal affect and mood.      Results  Laboratory Studies  Thyroid function tests: TSH WNL, T4 was low.    Imaging  Bone density test showed a 3.5% decrease in right hip and a 6.3% decrease in spine, osteoporosis.         Assessment and Plan:   The following treatment plan was discussed    Assessment & Plan  1. Low t4  Her TSH levels are within the normal range, but her T4 levels are low, indicating hypothyroidism. A recheck of her thyroid labs, including antibodies, will be conducted. If her thyroid levels remain abnormal, levothyroxine will be initiated. She is advised to schedule an appointment with an endocrinologist.    2. Osteoporosis.  There is a 3.5% decrease in her right hip and a 6.3% decrease in her spine, indicating significant osteoporosis progression. She is advised to continue her daily intake of calcium and vitamin D. A new referral to an endocrinologist has been made to discuss potential treatments to help rebuild her bones and prevent spine fractures. Reinforced again the importance of following up and continuing her supplements, she understands and will schedule.     3. Health Maintenance.  STD testing, including HIV and syphilis, will be conducted due to a new partner. She will receive her flu shot at work in a couple of weeks.          ORDERS:  1. Low T4  - TSH; Future  - FREE THYROXINE; Future  - TRIIDOTHYRONINE; Future  - THYROID PEROXIDASE  (TPO) AB; Future  - ANTITHYROGLOBULIN AB; Future  - Referral to Endocrinology    2. Premature osteoporosis  - Referral to Endocrinology    3. Low bone density for " age  - Referral to Endocrinology    4. Screen for STD (sexually transmitted disease)  - HIV AG/AB COMBO ASSAY SCREENING; Future  - HEP C VIRUS ANTIBODY; Future  - RPR (SYPHILIS); Future  - Chlamydia/GC, PCR (Urine); Future    5. 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 Craft    Please note that this dictation was created using voice recognition software. I have made every reasonable attempt to correct obvious errors, but I expect that there are errors of grammar and possibly content that I did not discover before finalizing the note.      Attestation      Verbal consent was acquired by the patient to use Re[employer]It[employer] ambient listening note generation during this visit Yes

## 2024-10-18 ENCOUNTER — IMMUNIZATION (OUTPATIENT)
Dept: OCCUPATIONAL MEDICINE | Facility: CLINIC | Age: 29
End: 2024-10-18

## 2024-10-18 DIAGNOSIS — Z23 NEED FOR VACCINATION: Primary | ICD-10-CM

## 2024-10-18 PROCEDURE — 90656 IIV3 VACC NO PRSV 0.5 ML IM: CPT | Performed by: PREVENTIVE MEDICINE

## 2024-11-05 ENCOUNTER — HOSPITAL ENCOUNTER (OUTPATIENT)
Dept: LAB | Facility: MEDICAL CENTER | Age: 29
End: 2024-11-05
Attending: NURSE PRACTITIONER
Payer: COMMERCIAL

## 2024-11-05 DIAGNOSIS — Z11.3 SCREEN FOR STD (SEXUALLY TRANSMITTED DISEASE): ICD-10-CM

## 2024-11-05 DIAGNOSIS — R79.89 LOW T4: ICD-10-CM

## 2024-11-05 PROCEDURE — 36415 COLL VENOUS BLD VENIPUNCTURE: CPT

## 2024-11-05 PROCEDURE — 86800 THYROGLOBULIN ANTIBODY: CPT

## 2024-11-05 PROCEDURE — 86376 MICROSOMAL ANTIBODY EACH: CPT

## 2024-11-05 PROCEDURE — 84480 ASSAY TRIIODOTHYRONINE (T3): CPT

## 2024-11-05 PROCEDURE — 84443 ASSAY THYROID STIM HORMONE: CPT

## 2024-11-05 PROCEDURE — 84439 ASSAY OF FREE THYROXINE: CPT

## 2024-11-05 PROCEDURE — 87389 HIV-1 AG W/HIV-1&-2 AB AG IA: CPT

## 2024-11-05 PROCEDURE — 86803 HEPATITIS C AB TEST: CPT

## 2024-11-05 PROCEDURE — 86780 TREPONEMA PALLIDUM: CPT

## 2024-11-06 LAB
HCV AB SER QL: NONREACTIVE
HIV 1+2 AB+HIV1 P24 AG SERPL QL IA: NORMAL
T PALLIDUM AB SER QL IA: NONREACTIVE
T3 SERPL-MCNC: 137 NG/DL (ref 60–181)
T4 FREE SERPL-MCNC: 1.08 NG/DL (ref 0.93–1.7)
THYROPEROXIDASE AB SERPL-ACNC: 14 IU/ML (ref 0–9)
TSH SERPL-ACNC: 2.82 UIU/ML (ref 0.35–5.5)

## 2024-11-07 LAB — THYROGLOB AB SERPL-ACNC: <0.9 IU/ML (ref 0–4)

## 2024-11-23 ENCOUNTER — OFFICE VISIT (OUTPATIENT)
Dept: URGENT CARE | Facility: CLINIC | Age: 29
End: 2024-11-23
Payer: COMMERCIAL

## 2024-11-23 ENCOUNTER — HOSPITAL ENCOUNTER (OUTPATIENT)
Facility: MEDICAL CENTER | Age: 29
End: 2024-11-23
Attending: STUDENT IN AN ORGANIZED HEALTH CARE EDUCATION/TRAINING PROGRAM
Payer: COMMERCIAL

## 2024-11-23 VITALS
OXYGEN SATURATION: 100 % | WEIGHT: 121 LBS | RESPIRATION RATE: 16 BRPM | SYSTOLIC BLOOD PRESSURE: 102 MMHG | HEART RATE: 91 BPM | DIASTOLIC BLOOD PRESSURE: 70 MMHG | HEIGHT: 64 IN | BODY MASS INDEX: 20.66 KG/M2 | TEMPERATURE: 98 F

## 2024-11-23 DIAGNOSIS — N89.8 VAGINAL ITCHING: ICD-10-CM

## 2024-11-23 DIAGNOSIS — Z11.3 SCREENING EXAMINATION FOR STI: ICD-10-CM

## 2024-11-23 DIAGNOSIS — R35.0 URINARY FREQUENCY: ICD-10-CM

## 2024-11-23 DIAGNOSIS — N93.9 ABNORMAL VAGINAL BLEEDING: ICD-10-CM

## 2024-11-23 LAB
APPEARANCE UR: CLEAR
BILIRUB UR STRIP-MCNC: NEGATIVE MG/DL
CANDIDA DNA VAG QL PROBE+SIG AMP: POSITIVE
COLOR UR AUTO: NORMAL
G VAGINALIS DNA VAG QL PROBE+SIG AMP: POSITIVE
GLUCOSE UR STRIP.AUTO-MCNC: NEGATIVE MG/DL
KETONES UR STRIP.AUTO-MCNC: NEGATIVE MG/DL
LEUKOCYTE ESTERASE UR QL STRIP.AUTO: NEGATIVE
NITRITE UR QL STRIP.AUTO: NEGATIVE
PH UR STRIP.AUTO: 6 [PH] (ref 5–8)
PROT UR QL STRIP: NEGATIVE MG/DL
RBC UR QL AUTO: NORMAL
SP GR UR STRIP.AUTO: 1.02
T VAGINALIS DNA VAG QL PROBE+SIG AMP: NEGATIVE
UROBILINOGEN UR STRIP-MCNC: 8 MG/DL

## 2024-11-23 PROCEDURE — 87086 URINE CULTURE/COLONY COUNT: CPT

## 2024-11-23 PROCEDURE — 99214 OFFICE O/P EST MOD 30 MIN: CPT | Performed by: STUDENT IN AN ORGANIZED HEALTH CARE EDUCATION/TRAINING PROGRAM

## 2024-11-23 PROCEDURE — 87591 N.GONORRHOEAE DNA AMP PROB: CPT

## 2024-11-23 PROCEDURE — 87510 GARDNER VAG DNA DIR PROBE: CPT

## 2024-11-23 PROCEDURE — 3074F SYST BP LT 130 MM HG: CPT | Performed by: STUDENT IN AN ORGANIZED HEALTH CARE EDUCATION/TRAINING PROGRAM

## 2024-11-23 PROCEDURE — 87660 TRICHOMONAS VAGIN DIR PROBE: CPT

## 2024-11-23 PROCEDURE — 87480 CANDIDA DNA DIR PROBE: CPT

## 2024-11-23 PROCEDURE — 3078F DIAST BP <80 MM HG: CPT | Performed by: STUDENT IN AN ORGANIZED HEALTH CARE EDUCATION/TRAINING PROGRAM

## 2024-11-23 PROCEDURE — 87491 CHLMYD TRACH DNA AMP PROBE: CPT

## 2024-11-23 PROCEDURE — 81002 URINALYSIS NONAUTO W/O SCOPE: CPT | Performed by: STUDENT IN AN ORGANIZED HEALTH CARE EDUCATION/TRAINING PROGRAM

## 2024-11-23 ASSESSMENT — ENCOUNTER SYMPTOMS
CARDIOVASCULAR NEGATIVE: 1
RESPIRATORY NEGATIVE: 1
FLANK PAIN: 0
CONSTITUTIONAL NEGATIVE: 1
MUSCULOSKELETAL NEGATIVE: 1

## 2024-11-23 ASSESSMENT — FIBROSIS 4 INDEX: FIB4 SCORE: 0.77

## 2024-11-23 NOTE — PROGRESS NOTES
Subjective:   Alicia Tejada is a 29 y.o. female who presents for Vaginal Itching (Vaginal itching x 2-3 days/Spotting x 2 months)      HPI:  29-year-old female with 3 days of vaginal itching slightly increased urinary frequency.  She also has been having some spotting for the past few months after insertion.  - She would also like to be tested for STIs  - Denies any fevers, chills, flank or back pain.  - She does endorse some increased urinary frequency but denies any pain with urination.  - She does not report any vaginal discharge or vaginal pain but does report some mild vaginal itching.      Review of Systems   Constitutional: Negative.    HENT: Negative.     Respiratory: Negative.     Cardiovascular: Negative.    Genitourinary:  Positive for frequency. Negative for dysuria, flank pain, hematuria and urgency.   Musculoskeletal: Negative.        Medications:    albuterol Aers  cetirizine Tabs  diclofenac  Tbec  esomeprazole  Nexplanon Impl    Allergies: Patient has no known allergies.    Problem List: Alicia Tejada does not have any pertinent problems on file.    Surgical History:  Past Surgical History:   Procedure Laterality Date    ANUP BY LAPAROSCOPY  1/15/2019    Procedure: laparoscopic cholecystectomy;  Surgeon: Noel Greene M.D.;  Location: SURGERY [facility];  Service: General    TONSILLECTOMY         Past Social Hx: Alicia Tejada  reports that she has never smoked. She has never used smokeless tobacco. She reports current alcohol use of about 1.2 oz of alcohol per week. She reports that she does not currently use drugs after having used the following drugs: Marijuana.     Past Family Hx:  Alicia Tejada family history includes Breast Cancer in her paternal aunt; Diabetes in her maternal grandmother and paternal grandmother; GI Disease in her brother; Hypertension in her maternal grandmother and paternal grandmother; No Known Problems in her brother, brother,  "father, and mother; Other in her maternal uncle.     Problem list, medications, and allergies reviewed by myself today in Epic.     Objective:     /70 (BP Location: Left arm, Patient Position: Sitting, BP Cuff Size: Adult)   Pulse 91   Temp 36.7 °C (98 °F) (Temporal)   Resp 16   Ht 1.626 m (5' 4\")   Wt 54.9 kg (121 lb)   SpO2 100%   BMI 20.77 kg/m²     Physical Exam  Constitutional:       Appearance: Normal appearance.   Cardiovascular:      Rate and Rhythm: Normal rate and regular rhythm.      Pulses: Normal pulses.      Heart sounds: Normal heart sounds.   Pulmonary:      Effort: Pulmonary effort is normal.      Breath sounds: Normal breath sounds.   Abdominal:      General: Abdomen is flat.      Tenderness: There is no right CVA tenderness or left CVA tenderness.   Neurological:      Mental Status: She is alert.         Assessment/Plan:     Diagnosis and associated orders:     1. Screening examination for STI  Chlamydia/GC, PCR (Genital/Anal swab)    HIV AG/AB Combo Assay Screening    T.Pallidum AB AMANDA (Screening)    URINE CULTURE(NEW)    VAGINAL PATHOGENS DNA PANEL    CANCELED: VAGINAL PATHOGENS DNA PANEL      2. Vaginal itching  Chlamydia/GC, PCR (Genital/Anal swab)    URINE CULTURE(NEW)    VAGINAL PATHOGENS DNA PANEL    POCT Urinalysis    CANCELED: VAGINAL PATHOGENS DNA PANEL      3. Abnormal vaginal bleeding        4. Urinary frequency           Comments/MDM:     1. Screening examination for STI  Like routine screening for STI.  She is currently sexually active.  She denies any current symptoms other than the vaginal itching.  Her last encounter was over 3 weeks ago.  In the symptoms started up to 3 days ago.  - Will order testing as below  - Chlamydia/GC, PCR (Genital/Anal swab); Future  - HIV AG/AB Combo Assay Screening; Future  - T.Pallidum AB AMANDA (Screening); Future  - URINE CULTURE(NEW); Future  - VAGINAL PATHOGENS DNA PANEL; Future  2. Vaginal itching  Will order gonorrhea chlamydia, " vaginal pathogens DNA panel.  - We will discuss treatment if any come back abnormal.    - Chlamydia/GC, PCR (Genital/Anal swab); Future  - URINE CULTURE(NEW); Future  - VAGINAL PATHOGENS DNA PANEL; Future --> + BV and yeast will treat with flagyl and diflucan  - POCT Urinalysis      3. Abnormal vaginal bleeding  Patient reports irregular spotting very light bleeding but pretty continuous spotting since her last switch out of Nexplanon back in July/August.  - She denies any vaginal pain at this time  - I discussed with patient that she should contact her OB/GYN for additional evaluation and possible use of oral contraceptive pills to assist with irregular bleeding.    4. Urinary frequency  Patient reports slightly increased urinary frequency but no dysuria, flank or back pain.  - Urinalysis completed today with few red blood cells but she is currently spotting and having irregular periods as well as increased urobilinogen.  Patient does have a history of sickle cell anemia has had this in the past.  - We will send for urine culture though at this time I do not believe to have urinary tract infection.  If patient has any changes in his symptomology including pain with urination, flank or back pain, development of fevers I recommend additional evaluation.           Differential diagnosis, natural history, supportive care, and indications for immediate follow-up discussed.    Advised the patient to follow-up with the primary care physician for recheck, reevaluation, and consideration of further management.    Please note that this dictation was created using voice recognition software. I have made a reasonable attempt to correct obvious errors, but I expect that there are errors of grammar and possibly content that I did not discover before finalizing the note.    Angel Luis Vanegas M.D.

## 2024-11-24 ENCOUNTER — PHARMACY VISIT (OUTPATIENT)
Dept: PHARMACY | Facility: MEDICAL CENTER | Age: 29
End: 2024-11-24
Payer: COMMERCIAL

## 2024-11-24 LAB
C TRACH DNA GENITAL QL NAA+PROBE: NEGATIVE
N GONORRHOEA DNA GENITAL QL NAA+PROBE: NEGATIVE
SPECIMEN SOURCE: NORMAL

## 2024-11-24 PROCEDURE — RXMED WILLOW AMBULATORY MEDICATION CHARGE: Performed by: STUDENT IN AN ORGANIZED HEALTH CARE EDUCATION/TRAINING PROGRAM

## 2024-11-24 RX ORDER — FLUCONAZOLE 150 MG/1
150 TABLET ORAL DAILY
Qty: 2 TABLET | Refills: 0 | Status: SHIPPED | OUTPATIENT
Start: 2024-11-24

## 2024-11-24 RX ORDER — METRONIDAZOLE 500 MG/1
500 TABLET ORAL 2 TIMES DAILY
Qty: 14 TABLET | Refills: 0 | Status: SHIPPED | OUTPATIENT
Start: 2024-11-24 | End: 2024-12-01

## 2024-11-25 LAB
BACTERIA UR CULT: NORMAL
SIGNIFICANT IND 70042: NORMAL
SITE SITE: NORMAL
SOURCE SOURCE: NORMAL

## 2025-01-02 ENCOUNTER — PHARMACY VISIT (OUTPATIENT)
Dept: PHARMACY | Facility: MEDICAL CENTER | Age: 30
End: 2025-01-02

## 2025-01-02 PROCEDURE — RXOTC WILLOW AMBULATORY OTC CHARGE

## 2025-01-06 ENCOUNTER — APPOINTMENT (OUTPATIENT)
Dept: OBGYN | Facility: CLINIC | Age: 30
End: 2025-01-06
Payer: COMMERCIAL

## 2025-02-27 ENCOUNTER — HOSPITAL ENCOUNTER (OUTPATIENT)
Dept: LAB | Facility: MEDICAL CENTER | Age: 30
End: 2025-02-27
Attending: FAMILY MEDICINE
Payer: COMMERCIAL

## 2025-02-27 ENCOUNTER — OFFICE VISIT (OUTPATIENT)
Dept: URGENT CARE | Facility: PHYSICIAN GROUP | Age: 30
End: 2025-02-27
Payer: COMMERCIAL

## 2025-02-27 ENCOUNTER — HOSPITAL ENCOUNTER (OUTPATIENT)
Facility: MEDICAL CENTER | Age: 30
End: 2025-02-27
Attending: FAMILY MEDICINE
Payer: COMMERCIAL

## 2025-02-27 VITALS
HEART RATE: 78 BPM | TEMPERATURE: 98.6 F | RESPIRATION RATE: 16 BRPM | SYSTOLIC BLOOD PRESSURE: 110 MMHG | DIASTOLIC BLOOD PRESSURE: 62 MMHG | BODY MASS INDEX: 20.04 KG/M2 | HEIGHT: 65 IN | WEIGHT: 120.26 LBS | OXYGEN SATURATION: 92 %

## 2025-02-27 DIAGNOSIS — Z11.3 SCREEN FOR STD (SEXUALLY TRANSMITTED DISEASE): ICD-10-CM

## 2025-02-27 PROCEDURE — 86803 HEPATITIS C AB TEST: CPT

## 2025-02-27 PROCEDURE — 87510 GARDNER VAG DNA DIR PROBE: CPT

## 2025-02-27 PROCEDURE — 36415 COLL VENOUS BLD VENIPUNCTURE: CPT

## 2025-02-27 PROCEDURE — 87491 CHLMYD TRACH DNA AMP PROBE: CPT

## 2025-02-27 PROCEDURE — 86704 HEP B CORE ANTIBODY TOTAL: CPT

## 2025-02-27 PROCEDURE — 87480 CANDIDA DNA DIR PROBE: CPT

## 2025-02-27 PROCEDURE — 87340 HEPATITIS B SURFACE AG IA: CPT

## 2025-02-27 PROCEDURE — 86706 HEP B SURFACE ANTIBODY: CPT

## 2025-02-27 PROCEDURE — 87389 HIV-1 AG W/HIV-1&-2 AB AG IA: CPT

## 2025-02-27 PROCEDURE — 86780 TREPONEMA PALLIDUM: CPT

## 2025-02-27 PROCEDURE — 87660 TRICHOMONAS VAGIN DIR PROBE: CPT

## 2025-02-27 PROCEDURE — 87591 N.GONORRHOEAE DNA AMP PROB: CPT

## 2025-02-27 ASSESSMENT — FIBROSIS 4 INDEX: FIB4 SCORE: 0.77

## 2025-02-27 NOTE — PROGRESS NOTES
"Subjective     Alicia Tejada is a 29 y.o. female who presents with Sexually Transmitted Diseases (Would like to get checked for STI. No Sx )      This is a  new problem with uncertain prognosis:   This is a very pleasant 29 y.o. who has come to the walk-in clinic today for asymptomatic STD screening.          ALLERGIES:  Patient has no known allergies.     PMH:  Past Medical History:   Diagnosis Date    Anemia     Anxiety     Asthma     Inhaler use PRN.    Mixed restrictive and obstructive lung disease (HCC) 8/5/2010    Nausea, vomiting, and diarrhea 3/12/2021    Premature osteoporosis     Sickle cell anemia (HCC)     Snoring     Negative sleep study.        PSH:  Past Surgical History:   Procedure Laterality Date    ANUP BY LAPAROSCOPY  1/15/2019    Procedure: laparoscopic cholecystectomy;  Surgeon: Noel Greene M.D.;  Location: SURGERY [facility];  Service: General    TONSILLECTOMY         MEDS:    Current Outpatient Medications:     cetirizine (ZYRTEC) 10 MG Tab, Take 10 mg by mouth every day., Disp: , Rfl:     albuterol 108 (90 Base) MCG/ACT Aero Soln inhalation aerosol, Inhale 2 Puffs every 6 hours as needed., Disp: , Rfl:     esomeprazole (NEXIUM) 20 MG capsule, Take 1 Capsule by mouth every morning before breakfast., Disp: 90 Capsule, Rfl: 3    etonogestrel (NEXPLANON) 68 MG Implant implant, Inject 1 Each under the skin one time., Disp: , Rfl:     ** I have documented what I find to be significant in regards to past medical, social, family and surgical history  in my HPI or under PMH/PSH/FH review section, otherwise it is noncontributory **         HPI    Review of Systems   All other systems reviewed and are negative.             Objective     /62 (BP Location: Right arm, Patient Position: Sitting, BP Cuff Size: Adult)   Pulse 78   Temp 37 °C (98.6 °F) (Temporal)   Resp 16   Ht 1.651 m (5' 5\")   Wt 54.5 kg (120 lb 4.2 oz)   SpO2 92%   BMI 20.01 kg/m²      Physical Exam  Vitals " and nursing note reviewed.   Constitutional:       General: She is not in acute distress.     Appearance: Normal appearance. She is well-developed. She is not ill-appearing, toxic-appearing or diaphoretic.   HENT:      Head: Normocephalic.   Cardiovascular:      Rate and Rhythm: Normal rate and regular rhythm.      Heart sounds: Normal heart sounds.   Pulmonary:      Effort: Pulmonary effort is normal. No respiratory distress.   Neurological:      Mental Status: She is alert.      Motor: No abnormal muscle tone.   Psychiatric:         Mood and Affect: Mood normal.         Behavior: Behavior normal.         1. Screen for STD (sexually transmitted disease)  Chlamydia/GC, PCR (Genital/Anal swab)    VAGINAL PATHOGENS DNA PANEL    HIV AG/AB Combo Assay Screening    T.Pallidum AB AMANDA (Screening)    Hepatitis C Virus Antibody    HEP B Surface Antibody    Hep B Core AB Total    Hep B Surface Antigen          - Dx, plan & d/c instructions discussed       Patient left in stable condition               Discussed if any in-clinic testing done [facility] results and [facility].  Testing such as strep, covid, flu, RSV and x-rays    Discussed if any testing, labs or imaging studies are obtained outside of the [facility] facility, it is their responsibility to contact the Urgent Care and let us know that it was done and get us the results so adequate follow up can be initiated    Any pertinent prior lab work and/or imaging studies in Epic have been reviewed by me today on day of this visit and taken into account for my treatment and plan today    Any pertinent PMH/PSH and/or chronic conditions and medications if any were reviewed today and taken into account for my treatment and plan today    Pertinent prior office visit, ER and urgent care notes in [facility] have been reviewed by me today on day of this visit.    Please note that this dictation may have been created using voice recognition software, if so I  have made every reasonable attempt to correct obvious errors, but I expect that there are errors of grammar and possibly content that I did not discover before finalizing the note.

## 2025-02-28 ENCOUNTER — RESULTS FOLLOW-UP (OUTPATIENT)
Dept: URGENT CARE | Facility: PHYSICIAN GROUP | Age: 30
End: 2025-02-28

## 2025-02-28 LAB
C TRACH DNA GENITAL QL NAA+PROBE: NEGATIVE
CANDIDA DNA VAG QL PROBE+SIG AMP: NEGATIVE
G VAGINALIS DNA VAG QL PROBE+SIG AMP: NEGATIVE
HBV CORE AB SERPL QL IA: NONREACTIVE
HBV SURFACE AB SERPL IA-ACNC: 15.1 MIU/ML (ref 0–10)
HBV SURFACE AG SER QL: NORMAL
HCV AB SER QL: NORMAL
HIV 1+2 AB+HIV1 P24 AG SERPL QL IA: NORMAL
N GONORRHOEA DNA GENITAL QL NAA+PROBE: NEGATIVE
SPECIMEN SOURCE: NORMAL
T PALLIDUM AB SER QL IA: NORMAL
T VAGINALIS DNA VAG QL PROBE+SIG AMP: NEGATIVE

## 2025-04-09 ENCOUNTER — GYNECOLOGY VISIT (OUTPATIENT)
Dept: OBGYN | Facility: CLINIC | Age: 30
End: 2025-04-09
Payer: COMMERCIAL

## 2025-04-09 VITALS — WEIGHT: 117 LBS | BODY MASS INDEX: 19.47 KG/M2 | DIASTOLIC BLOOD PRESSURE: 62 MMHG | SYSTOLIC BLOOD PRESSURE: 106 MMHG

## 2025-04-09 DIAGNOSIS — D57.1 HB-SS DISEASE WITHOUT CRISIS (HCC): ICD-10-CM

## 2025-04-09 DIAGNOSIS — Z30.46 NEXPLANON REMOVAL: ICD-10-CM

## 2025-04-09 PROCEDURE — 11982 REMOVE DRUG IMPLANT DEVICE: CPT | Performed by: STUDENT IN AN ORGANIZED HEALTH CARE EDUCATION/TRAINING PROGRAM

## 2025-04-09 ASSESSMENT — FIBROSIS 4 INDEX: FIB4 SCORE: 0.77

## 2025-04-09 NOTE — PROGRESS NOTES
GYN FOLLOW UP VISIT    CC:  Procedure (Nexplanon removal)       HPI: Patient is a 29 y.o.  who presents for NEXPLANON removal.  Pt had nexplanon placed 2024. Since placement has had AUB. Is no interested in any additional contraception.      ROS:   General: denies fever / chills  HEENT: denies sore throat:  CV: denies chest pain:  Repiratory: denies shortness of breath  GI: denies abdominal pain  : denies dysuria:    PFSH:  I personally reviewed the past medical and surgical histories.       PHYSICAL EXAMINATION:  Vital Signs:   Vitals:    25 1008   BP: 106/62   Weight: 117 lb     Body mass index is 19.47 kg/m².    Gen: appears well, NAD  Respiratory: normal effort  Abdomen: Soft, non-tender.  Extremities: nexplanon present RUE.    ASSESSMENT AND PLAN:  29 y.o.      1. Nexplanon removal  See procedure note for nexplanon removal.  - Consent for all Surgical, Special Diagnostic or Therapeutic Procedures        Time spent: 30 minutes      Cherrie Terry P.A.-C.       Never smoker

## 2025-04-09 NOTE — PROCEDURES
Nexplanon Removal Procedure Note    Patient presents for removal of Nexplanon. She is having removed due to AUB.  R/b/a discussed with patient, including pain during removal, bleeding, and bruising.  Procedure discussed at length with patient, including use of local anesthetic.  Implant is in the patient's RIGHT upper arm.      Vitals:    04/09/25 1008   BP: 106/62   Weight: 117 lb       The patient was positioned on the examination table with her RIGHT arm flexed at the elbow and externally rotated so that her wrist was parallel to her ear.  The Implanon was easily palpated along the medial aspect of the left upper arm and felt to be superficial.  The skin over the distal end of the implant was cleansed with betadine and 3mL of 1% lidocaine with epinephrine was injected subcutaneously.      An incision was made with scalpel and the end of the implant was visualized.  Using a forcep, the implant was grasped, dissected and removed intact. Minimal blood loss. Steristrips placed over incision and a pressure dressing was applied.   Patient tolerated the procedure well.     Cherrie Terry P.A.-C.

## 2025-04-09 NOTE — PROGRESS NOTES
Nexplanon removal  Consent received   Pt report bleeding 2 weeks out of the month on nexplanon, no other BC desired at this time

## 2025-04-15 ENCOUNTER — OFFICE VISIT (OUTPATIENT)
Dept: URGENT CARE | Facility: CLINIC | Age: 30
End: 2025-04-15
Payer: COMMERCIAL

## 2025-04-15 ENCOUNTER — HOSPITAL ENCOUNTER (OUTPATIENT)
Facility: MEDICAL CENTER | Age: 30
End: 2025-04-15
Payer: COMMERCIAL

## 2025-04-15 VITALS
TEMPERATURE: 98 F | SYSTOLIC BLOOD PRESSURE: 110 MMHG | DIASTOLIC BLOOD PRESSURE: 76 MMHG | WEIGHT: 120 LBS | RESPIRATION RATE: 14 BRPM | HEIGHT: 65 IN | BODY MASS INDEX: 19.99 KG/M2 | OXYGEN SATURATION: 88 % | HEART RATE: 94 BPM

## 2025-04-15 DIAGNOSIS — B96.89 BACTERIAL VAGINOSIS: ICD-10-CM

## 2025-04-15 DIAGNOSIS — N89.8 VAGINAL ODOR: ICD-10-CM

## 2025-04-15 DIAGNOSIS — N76.0 BACTERIAL VAGINOSIS: ICD-10-CM

## 2025-04-15 PROCEDURE — 87510 GARDNER VAG DNA DIR PROBE: CPT

## 2025-04-15 PROCEDURE — 3078F DIAST BP <80 MM HG: CPT

## 2025-04-15 PROCEDURE — 87480 CANDIDA DNA DIR PROBE: CPT

## 2025-04-15 PROCEDURE — 99213 OFFICE O/P EST LOW 20 MIN: CPT

## 2025-04-15 PROCEDURE — 87660 TRICHOMONAS VAGIN DIR PROBE: CPT

## 2025-04-15 PROCEDURE — 3074F SYST BP LT 130 MM HG: CPT

## 2025-04-15 PROCEDURE — 87798 DETECT AGENT NOS DNA AMP: CPT | Mod: 91

## 2025-04-15 PROCEDURE — 87563 M. GENITALIUM AMP PROBE: CPT

## 2025-04-15 ASSESSMENT — ENCOUNTER SYMPTOMS
NEUROLOGICAL NEGATIVE: 1
FLANK PAIN: 0
SHORTNESS OF BREATH: 0
MUSCULOSKELETAL NEGATIVE: 1
EYES NEGATIVE: 1
FEVER: 0
GASTROINTESTINAL NEGATIVE: 1
CHILLS: 0
COUGH: 0

## 2025-04-15 ASSESSMENT — FIBROSIS 4 INDEX: FIB4 SCORE: 0.77

## 2025-04-15 NOTE — PROGRESS NOTES
Subjective:     Chief Complaint   Patient presents with    Vaginitis     Having vaginal odor , wants to be swabbed        HPI:  Alicia Tejada is a 29 y.o. female who presents for vaginal odor. No change in amount or color of discharge. Reports associated minor pain when whipping after urinating.  Had nexplanon out last week. Denies pelvic pain or abd pain. Denies painful urination. Last STI testing was in Feb.         ROS:  Review of Systems   Constitutional:  Negative for chills and fever.   HENT: Negative.     Eyes: Negative.    Respiratory:  Negative for cough and shortness of breath.    Cardiovascular:  Negative for chest pain.   Gastrointestinal: Negative.    Genitourinary: Negative.  Negative for dysuria, flank pain, frequency, hematuria and urgency.   Musculoskeletal: Negative.    Skin:  Negative for rash.   Neurological: Negative.    All other systems reviewed and are negative.       CURRENT MEDICATIONS:  Current Outpatient Medications   Medication Sig Refill Last Dispense    albuterol 108 (90 Base) MCG/ACT Aero Soln inhalation aerosol Inhale 2 Puffs every 6 hours as needed.  Unknown (patient-reported)    cetirizine (ZYRTEC) 10 MG Tab Take 10 mg by mouth every day.  Unknown (patient-reported)    esomeprazole (NEXIUM) 20 MG capsule Take 1 Capsule by mouth every morning before breakfast. 3 Unknown (outside pharmacy)    etonogestrel (NEXPLANON) 68 MG Implant implant Inject 1 Each under the skin one time. (Patient not taking: Reported on 4/15/2025)  Unknown (patient-reported)    metroNIDAZOLE (FLAGYL) 500 MG Tab Take 1 Tablet by mouth 2 times a day for 7 days. 0 Unknown (outside pharmacy)       ALLERGIES:   No Known Allergies    PROBLEM LIST:    does not have any pertinent problems on file.    Allergies, Medications, & Tobacco/Substance Use were reconciled by the Medical Assistant and reviewed by myself.     Objective:   /76 (BP Location: Left arm, Patient Position: Sitting, BP Cuff Size: Adult)    "Pulse 94   Temp 36.7 °C (98 °F) (Temporal)   Resp 14   Ht 1.651 m (5' 5\")   Wt 54.4 kg (120 lb)   SpO2 88%   BMI 19.97 kg/m²     Physical Exam  Constitutional:       General: She is not in acute distress.     Appearance: She is not ill-appearing or toxic-appearing.   Cardiovascular:      Rate and Rhythm: Normal rate and regular rhythm.   Pulmonary:      Effort: Pulmonary effort is normal.      Breath sounds: Normal breath sounds.   Skin:     General: Skin is warm and dry.   Neurological:      Mental Status: She is alert.         Assessment/Plan:   Pt's history and physical exam consistent with vaginal odor. Swabs will be collected in clinic and pt will be informed of results via PSafet.     Assessment & Plan  Vaginal odor  Orders:    VAGINAL PATHOGENS DNA PANEL; Future    UROGENITAL UREAPLASMA/MYCOPLASMA, PCR; Future    Bacterial vaginosis  Orders:    metroNIDAZOLE (FLAGYL) 500 MG Tab; Take 1 Tablet by mouth 2 times a day for 7 days.        Discussed differential diagnosis, management options, risks/benefits, and alternatives to planned treatment. Pt expressed understanding and the treatment plan was agreed upon. Questions were encouraged and answered. Pt encouraged to return to urgent care as needed if new or worsening symptoms or if there is no improvement in condition. Pt educated in red flags and indications to immediately call 911 or present to the Emergency Department. Advised the patient to follow-up with the primary care physician for recheck, reevaluation, and further management.    I personally reviewed prior external notes and test results pertinent to today's visit. I have independently reviewed and interpreted all diagnostics ordered during this visit.    Please note that this dictation was created using voice recognition software. I have made a reasonable attempt to correct obvious errors, but I expect that there are errors of grammar and possibly content that I did not discover before " finalizing the note.    This note was electronically signed by RED Pastrana

## 2025-04-16 ENCOUNTER — RESULTS FOLLOW-UP (OUTPATIENT)
Dept: URGENT CARE | Facility: CLINIC | Age: 30
End: 2025-04-16

## 2025-04-16 LAB
CANDIDA DNA VAG QL PROBE+SIG AMP: NEGATIVE
G VAGINALIS DNA VAG QL PROBE+SIG AMP: POSITIVE
T VAGINALIS DNA VAG QL PROBE+SIG AMP: NEGATIVE

## 2025-04-16 RX ORDER — METRONIDAZOLE 500 MG/1
500 TABLET ORAL 2 TIMES DAILY
Qty: 14 TABLET | Refills: 0 | Status: SHIPPED | OUTPATIENT
Start: 2025-04-16 | End: 2025-04-23

## 2025-04-18 LAB
M GENITALIUM DNA SPEC QL NAA+PROBE: NOT DETECTED
M HOMINIS DNA SPEC QL NAA+PROBE: NOT DETECTED
SPECIMEN SOURCE: NORMAL
U PARVUM DNA SPEC QL NAA+PROBE: NOT DETECTED
U UREALYTICUM DNA SPEC QL NAA+PROBE: NOT DETECTED

## 2025-05-02 ENCOUNTER — APPOINTMENT (OUTPATIENT)
Dept: HEMATOLOGY ONCOLOGY | Facility: MEDICAL CENTER | Age: 30
End: 2025-05-02
Attending: NURSE PRACTITIONER
Payer: COMMERCIAL

## 2025-05-05 ENCOUNTER — HOSPITAL ENCOUNTER (OUTPATIENT)
Dept: LAB | Facility: MEDICAL CENTER | Age: 30
End: 2025-05-05
Attending: NURSE PRACTITIONER
Payer: COMMERCIAL

## 2025-05-05 DIAGNOSIS — D57.1 HB-SS DISEASE WITHOUT CRISIS (HCC): ICD-10-CM

## 2025-05-05 LAB
ALBUMIN SERPL BCP-MCNC: 4.2 G/DL (ref 3.2–4.9)
ALBUMIN/GLOB SERPL: 1.3 G/DL
ALP SERPL-CCNC: 47 U/L (ref 30–99)
ALT SERPL-CCNC: 16 U/L (ref 2–50)
ANION GAP SERPL CALC-SCNC: 9 MMOL/L (ref 7–16)
ANISOCYTOSIS BLD QL SMEAR: ABNORMAL
AST SERPL-CCNC: 66 U/L (ref 12–45)
BASOPHILS # BLD AUTO: 0.7 % (ref 0–1.8)
BASOPHILS # BLD: 0.07 K/UL (ref 0–0.12)
BILIRUB SERPL-MCNC: 3.6 MG/DL (ref 0.1–1.5)
BUN SERPL-MCNC: 4 MG/DL (ref 8–22)
CALCIUM ALBUM COR SERPL-MCNC: 9.2 MG/DL (ref 8.5–10.5)
CALCIUM SERPL-MCNC: 9.4 MG/DL (ref 8.5–10.5)
CHLORIDE SERPL-SCNC: 104 MMOL/L (ref 96–112)
CO2 SERPL-SCNC: 25 MMOL/L (ref 20–33)
COMMENT 1642: NORMAL
CREAT SERPL-MCNC: 0.47 MG/DL (ref 0.5–1.4)
EOSINOPHIL # BLD AUTO: 0.35 K/UL (ref 0–0.51)
EOSINOPHIL NFR BLD: 3.5 % (ref 0–6.9)
ERYTHROCYTE [DISTWIDTH] IN BLOOD BY AUTOMATED COUNT: 78.4 FL (ref 35.9–50)
FASTING STATUS PATIENT QL REPORTED: NORMAL
GFR SERPLBLD CREATININE-BSD FMLA CKD-EPI: 131 ML/MIN/1.73 M 2
GLOBULIN SER CALC-MCNC: 3.2 G/DL (ref 1.9–3.5)
GLUCOSE SERPL-MCNC: 79 MG/DL (ref 65–99)
HCT VFR BLD AUTO: 20.4 % (ref 37–47)
HGB BLD-MCNC: 7.3 G/DL (ref 12–16)
IMM GRANULOCYTES # BLD AUTO: 0.04 K/UL (ref 0–0.11)
IMM GRANULOCYTES NFR BLD AUTO: 0.4 % (ref 0–0.9)
LYMPHOCYTES # BLD AUTO: 3.51 K/UL (ref 1–4.8)
LYMPHOCYTES NFR BLD: 35.1 % (ref 22–41)
MACROCYTES BLD QL SMEAR: ABNORMAL
MCH RBC QN AUTO: 31.1 PG (ref 27–33)
MCHC RBC AUTO-ENTMCNC: 35.8 G/DL (ref 32.2–35.5)
MCV RBC AUTO: 86.8 FL (ref 81.4–97.8)
MICROCYTES BLD QL SMEAR: ABNORMAL
MONOCYTES # BLD AUTO: 0.64 K/UL (ref 0–0.85)
MONOCYTES NFR BLD AUTO: 6.4 % (ref 0–13.4)
MORPHOLOGY BLD-IMP: NORMAL
NEUTROPHILS # BLD AUTO: 5.4 K/UL (ref 1.82–7.42)
NEUTROPHILS NFR BLD: 53.9 % (ref 44–72)
NRBC # BLD AUTO: 0.41 K/UL
NRBC BLD-RTO: 4.1 /100 WBC (ref 0–0.2)
OVALOCYTES BLD QL SMEAR: NORMAL
PLATELET # BLD AUTO: 490 K/UL (ref 164–446)
PLATELET BLD QL SMEAR: NORMAL
PMV BLD AUTO: 10.8 FL (ref 9–12.9)
POIKILOCYTOSIS BLD QL SMEAR: NORMAL
POLYCHROMASIA BLD QL SMEAR: NORMAL
POTASSIUM SERPL-SCNC: 4.5 MMOL/L (ref 3.6–5.5)
PROT SERPL-MCNC: 7.4 G/DL (ref 6–8.2)
RBC # BLD AUTO: 2.35 M/UL (ref 4.2–5.4)
RBC BLD AUTO: PRESENT
SICKLE CELLS BLD QL SMEAR: NORMAL
SODIUM SERPL-SCNC: 138 MMOL/L (ref 135–145)
TARGETS BLD QL SMEAR: NORMAL
WBC # BLD AUTO: 10 K/UL (ref 4.8–10.8)

## 2025-05-05 PROCEDURE — 36415 COLL VENOUS BLD VENIPUNCTURE: CPT

## 2025-05-05 PROCEDURE — 85025 COMPLETE CBC W/AUTO DIFF WBC: CPT

## 2025-05-05 PROCEDURE — 80053 COMPREHEN METABOLIC PANEL: CPT

## 2025-05-09 ENCOUNTER — OFFICE VISIT (OUTPATIENT)
Dept: URGENT CARE | Facility: CLINIC | Age: 30
End: 2025-05-09
Payer: COMMERCIAL

## 2025-05-09 VITALS
RESPIRATION RATE: 16 BRPM | HEART RATE: 102 BPM | TEMPERATURE: 97.9 F | BODY MASS INDEX: 21.09 KG/M2 | WEIGHT: 119 LBS | SYSTOLIC BLOOD PRESSURE: 96 MMHG | DIASTOLIC BLOOD PRESSURE: 64 MMHG | OXYGEN SATURATION: 90 % | HEIGHT: 63 IN

## 2025-05-09 DIAGNOSIS — J02.9 PHARYNGITIS, UNSPECIFIED ETIOLOGY: ICD-10-CM

## 2025-05-09 LAB — S PYO DNA SPEC NAA+PROBE: NOT DETECTED

## 2025-05-09 PROCEDURE — 3074F SYST BP LT 130 MM HG: CPT | Performed by: PHYSICIAN ASSISTANT

## 2025-05-09 PROCEDURE — 99213 OFFICE O/P EST LOW 20 MIN: CPT | Performed by: PHYSICIAN ASSISTANT

## 2025-05-09 PROCEDURE — 3078F DIAST BP <80 MM HG: CPT | Performed by: PHYSICIAN ASSISTANT

## 2025-05-09 PROCEDURE — 87651 STREP A DNA AMP PROBE: CPT | Performed by: PHYSICIAN ASSISTANT

## 2025-05-09 ASSESSMENT — FIBROSIS 4 INDEX: FIB4 SCORE: 0.98

## 2025-05-09 NOTE — PROGRESS NOTES
Subjective     Alicia Tejada is a 29 y.o. female who presents with Sore Throat (Sore throat x 5 days)    HPI:  Alicia Tejada is a 29 y.o. female who presents today for evaluation of sore throat.  Sore throat has been present for approximately 5 days.  No fever.  Possible sick exposures at her workplace.  Has been using OTC medications such as topical drops which provide moderate relief.        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  MEDS:   Current Outpatient Medications:     cetirizine (ZYRTEC) 10 MG Tab, Take 10 mg by mouth every day., Disp: , Rfl:     albuterol 108 (90 Base) MCG/ACT Aero Soln inhalation aerosol, Inhale 2 Puffs every 6 hours as needed., Disp: , Rfl:     esomeprazole (NEXIUM) 20 MG capsule, Take 1 Capsule by mouth every morning before breakfast., Disp: 90 Capsule, Rfl: 3    etonogestrel (NEXPLANON) 68 MG Implant implant, Inject 1 Each under the skin one time. (Patient not taking: Reported on 4/15/2025), Disp: , Rfl:   ALLERGIES: No Known Allergies  SURGHX:   Past Surgical History:   Procedure Laterality Date    ANUP BY LAPAROSCOPY  1/15/2019    Procedure: laparoscopic cholecystectomy;  Surgeon: Noel Greene M.D.;  Location: SURGERY [facility];  Service: General    TONSILLECTOMY       SOCHX:  reports that she has never smoked. She has never used smokeless tobacco. She reports that she does not currently use alcohol after a past usage of about 1.2 oz of alcohol per week. She reports that she does not currently use drugs after having used the following drugs: Marijuana.  FH: Family history was reviewed, no pertinent findings to report      Objective     BP 96/64 (BP Location: Left arm, Patient Position: Sitting, BP Cuff Size: Adult)   Pulse (!) 102   Temp 36.6 °C (97.9 °F)  "(Temporal)   Resp 16   Ht 1.6 m (5' 3\")   Wt 54 kg (119 lb)   SpO2 90%   BMI 21.08 kg/m²      Physical Exam  Constitutional:       Appearance: She is well-developed.   HENT:      Head: Normocephalic and atraumatic.      Right Ear: Tympanic membrane, ear canal and external ear normal.      Left Ear: Tympanic membrane, ear canal and external ear normal.      Nose: Mucosal edema present. No congestion or rhinorrhea.      Mouth/Throat:      Lips: Pink.      Mouth: Mucous membranes are moist.      Pharynx: Oropharynx is clear.      Comments: Tonsils are surgically absent  Eyes:      Conjunctiva/sclera: Conjunctivae normal.      Pupils: Pupils are equal, round, and reactive to light.   Cardiovascular:      Rate and Rhythm: Normal rate and regular rhythm.      Heart sounds: Normal heart sounds. No murmur heard.  Pulmonary:      Effort: Pulmonary effort is normal.      Breath sounds: Normal breath sounds. No wheezing.   Musculoskeletal:      Cervical back: Normal range of motion.   Lymphadenopathy:      Cervical: No cervical adenopathy.   Skin:     General: Skin is warm and dry.      Capillary Refill: Capillary refill takes less than 2 seconds.   Neurological:      Mental Status: She is alert and oriented to person, place, and time.   Psychiatric:         Behavior: Behavior normal.         Judgment: Judgment normal.         POCT GROUP A STREP, PCR - Negative    Assessment & Plan     1. Pharyngitis, unspecified etiology  - POCT GROUP A STREP, PCR  PCR strep test negative.  Sore throat could be secondary to viral illness or allergy mediated.  Recommend continued use of supportive care measures and over-the-counter medications for symptom relief.  Follow-up as needed.        Differential Diagnosis, natural history, and supportive care discussed. Return to the Urgent Care or follow up with your PCP if symptoms fail to resolve, or for any new or worsening symptoms. Emergency room precautions discussed. Patient and/or family " appears understanding of information.

## 2025-05-21 ENCOUNTER — OFFICE VISIT (OUTPATIENT)
Dept: URGENT CARE | Facility: PHYSICIAN GROUP | Age: 30
End: 2025-05-21
Payer: COMMERCIAL

## 2025-05-21 VITALS
SYSTOLIC BLOOD PRESSURE: 110 MMHG | HEART RATE: 82 BPM | TEMPERATURE: 98.7 F | BODY MASS INDEX: 19.66 KG/M2 | RESPIRATION RATE: 18 BRPM | HEIGHT: 65 IN | DIASTOLIC BLOOD PRESSURE: 58 MMHG | WEIGHT: 118 LBS | OXYGEN SATURATION: 94 %

## 2025-05-21 DIAGNOSIS — B96.89 ACUTE BACTERIAL SINUSITIS: Primary | ICD-10-CM

## 2025-05-21 DIAGNOSIS — J01.90 ACUTE BACTERIAL SINUSITIS: Primary | ICD-10-CM

## 2025-05-21 DIAGNOSIS — R05.1 ACUTE COUGH: ICD-10-CM

## 2025-05-21 PROCEDURE — 99213 OFFICE O/P EST LOW 20 MIN: CPT | Performed by: STUDENT IN AN ORGANIZED HEALTH CARE EDUCATION/TRAINING PROGRAM

## 2025-05-21 PROCEDURE — 3074F SYST BP LT 130 MM HG: CPT | Performed by: STUDENT IN AN ORGANIZED HEALTH CARE EDUCATION/TRAINING PROGRAM

## 2025-05-21 PROCEDURE — 3078F DIAST BP <80 MM HG: CPT | Performed by: STUDENT IN AN ORGANIZED HEALTH CARE EDUCATION/TRAINING PROGRAM

## 2025-05-21 RX ORDER — BENZONATATE 100 MG/1
200 CAPSULE ORAL 3 TIMES DAILY PRN
Qty: 60 CAPSULE | Refills: 0 | Status: SHIPPED | OUTPATIENT
Start: 2025-05-21

## 2025-05-21 ASSESSMENT — FIBROSIS 4 INDEX: FIB4 SCORE: 0.98

## 2025-05-21 NOTE — PROGRESS NOTES
Subjective:   Alicia Tejada is a 29 y.o. female who presents for Cough (Follow up 5/9 still having sx cough, fatigue)      HPI:  29-year-old female presents to the urgent care for sinus pressure, nasal congestion, postnasal drip, mixed dry/productive cough, and fatigue.  Had sore throat and postnasal drip on 5/9/2025 and was seen at that time.  Had negative strep test.  Felt like her symptoms never fully went away and the cough got worse over the past 7 days.  Having worsening sinus symptoms as well.  Does have history of asthma but no wheezing or active shortness of breath.  Has been using OTC medication without improvement.  Cough is most productive in the morning and then becomes more dry in the day.  Sinuses also seem to be worse first thing in the morning as well.    Medications:    albuterol Aers  amoxicillin-clavulanate Tabs  benzonatate Caps  cetirizine Tabs  esomeprazole  Nexplanon Impl    Allergies: Patient has no known allergies.    Problem List: Alicia Tejada does not have any pertinent problems on file.    Surgical History:  Past Surgical History:   Procedure Laterality Date    ANUP BY LAPAROSCOPY  1/15/2019    Procedure: laparoscopic cholecystectomy;  Surgeon: Noel Greene M.D.;  Location: SURGERY [facility];  Service: General    TONSILLECTOMY         Past Social Hx: Alicia Tejada  reports that she has never smoked. She has never used smokeless tobacco. She reports that she does not currently use alcohol after a past usage of about 1.2 oz of alcohol per week. She reports that she does not currently use drugs after having used the following drugs: Marijuana.     Past Family Hx:  Alciia Tejada family history includes Breast Cancer in her paternal aunt; Diabetes in her maternal grandmother and paternal grandmother; GI Disease in her brother; Hypertension in her maternal grandmother and paternal grandmother; No Known Problems in her brother, brother, father, and  "mother; Other in her maternal uncle.     Problem list, medications, and allergies reviewed by myself today in Epic.     Objective:     /58   Pulse 82   Temp 37.1 °C (98.7 °F)   Resp 18   Ht 1.651 m (5' 5\")   Wt 53.5 kg (118 lb)   SpO2 94%   BMI 19.64 kg/m²     Physical Exam  Vitals reviewed.   Constitutional:       Appearance: She is not toxic-appearing.   HENT:      Right Ear: Tympanic membrane, ear canal and external ear normal.      Left Ear: Tympanic membrane, ear canal and external ear normal.      Nose: Congestion present.   Cardiovascular:      Rate and Rhythm: Normal rate and regular rhythm.      Pulses: Normal pulses.      Heart sounds: Normal heart sounds. No murmur heard.  Pulmonary:      Effort: Pulmonary effort is normal. No respiratory distress.      Breath sounds: Normal breath sounds. No stridor. No wheezing, rhonchi or rales.         Assessment/Plan:     Diagnosis and associated orders:     1. Acute bacterial sinusitis  amoxicillin-clavulanate (AUGMENTIN) 875-125 MG Tab      2. Acute cough  benzonatate (TESSALON) 100 MG Cap         Comments/MDM:     Patient's presentation physical exam findings are consistent with acute bacterial sinusitis.  Given the length of symptoms and worsening symptoms over the past 7 days, patient will be started on a course of Augmentin.  Tessalon to hopefully better control her cough.  I do believe her cough is likely from her thick postnasal drip.  Pulmonary exam shows no wheezing, rales, rhonchi.  No signs of pneumonia.  No signs of asthma exacerbation at this time.  Continue home supportive care.  Return if no improvement or symptoms worsen.         Differential diagnosis, natural history, supportive care, and indications for immediate follow-up discussed.    Advised the patient to follow-up with the primary care physician for recheck, reevaluation, and consideration of further management.    Please note that this dictation was created using voice " recognition software. I have made a reasonable attempt to correct obvious errors, but I expect that there are errors of grammar and possibly content that I did not discover before finalizing the note.    Electronically signed by [place] PA-C.

## 2025-05-22 ENCOUNTER — TELEPHONE (OUTPATIENT)
Dept: MEDICAL GROUP | Facility: LAB | Age: 30
End: 2025-05-22
Payer: COMMERCIAL

## 2025-05-22 NOTE — TELEPHONE ENCOUNTER
Phone Number Called: [phone]    Message: Lvm regards her nexplanon that was ordered last year on 03/22/2024 when she seen Dr. Carter. Advised since its been over year we are going to get rid of it. Any questions she can call back at [phone]

## 2025-05-23 ENCOUNTER — HOSPITAL ENCOUNTER (OUTPATIENT)
Dept: HEMATOLOGY ONCOLOGY | Facility: MEDICAL CENTER | Age: 30
End: 2025-05-23
Attending: NURSE PRACTITIONER
Payer: COMMERCIAL

## 2025-05-23 VITALS
OXYGEN SATURATION: 97 % | WEIGHT: 118 LBS | HEART RATE: 72 BPM | SYSTOLIC BLOOD PRESSURE: 102 MMHG | RESPIRATION RATE: 12 BRPM | HEIGHT: 65 IN | BODY MASS INDEX: 19.66 KG/M2 | DIASTOLIC BLOOD PRESSURE: 70 MMHG | TEMPERATURE: 98 F

## 2025-05-23 DIAGNOSIS — R79.89 ELEVATED PLATELET COUNT: ICD-10-CM

## 2025-05-23 DIAGNOSIS — D57.1 HB-SS DISEASE WITHOUT CRISIS (HCC): ICD-10-CM

## 2025-05-23 DIAGNOSIS — Z09 ENCOUNTER FOR HEMATOLOGY FOLLOW-UP: ICD-10-CM

## 2025-05-23 DIAGNOSIS — M81.8 PREMATURE OSTEOPOROSIS: ICD-10-CM

## 2025-05-23 DIAGNOSIS — R17 TOTAL BILIRUBIN, ELEVATED: ICD-10-CM

## 2025-05-23 PROCEDURE — 99212 OFFICE O/P EST SF 10 MIN: CPT | Performed by: NURSE PRACTITIONER

## 2025-05-23 PROCEDURE — 99214 OFFICE O/P EST MOD 30 MIN: CPT | Performed by: NURSE PRACTITIONER

## 2025-05-23 ASSESSMENT — ENCOUNTER SYMPTOMS
COUGH: 0
MYALGIAS: 0
DIAPHORESIS: 1
SHORTNESS OF BREATH: 0
HEADACHES: 0
PALPITATIONS: 0
NAUSEA: 0
VOMITING: 0
TINGLING: 0
DIARRHEA: 0
CONSTIPATION: 1
BLOOD IN STOOL: 0
FEVER: 0
WEIGHT LOSS: 0
DIZZINESS: 0
CHILLS: 0
INSOMNIA: 0
WHEEZING: 0

## 2025-05-23 ASSESSMENT — FIBROSIS 4 INDEX: FIB4 SCORE: 0.98

## 2025-05-23 NOTE — PROGRESS NOTES
Subjective     Alicia Tejada is a 29 y.o. female who presents with Follow-Up (Sickle Cell)            HPI  Ms. Tejada presents for surveillance evaluation of sickle cell anemia without crisis. She is unaccompanied for today's visit.     Patient diagnosed in childhood and was followed closely by [facility], she moved to [place] at around 8 years of age. She has never required Hydrea therapy and manages symptoms very well with hydration and diet. She rarely requires transfusion, last completed in 3/2021. She had established with [facility] Dr. Gavino Carrillo's office, but required no intervention and will follow up only as needed.    Patient continues with very rare crises which are still well-managed with hydration, diet, mobility.  She denies myalgia, arthralgia.  She is not taking folic acid but will resume as her platelets are elevated which are suspected to be reactive elevation s/t mild anemia.  Patient is otherwise asymptomatic and at her baseline.      Review of Systems   Constitutional:  Positive for diaphoresis (w/ anxiety). 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   Respiratory:  Negative for cough, shortness of breath and wheezing.         On abx for sinus infection over past 2 weeks   Cardiovascular:  Negative for chest pain, palpitations and leg swelling.   Gastrointestinal:  Positive for constipation (fiber daily, consistent with baseline). Negative for blood in stool, diarrhea, melena, nausea and vomiting.   Genitourinary:  Negative for dysuria and hematuria.   Musculoskeletal:  Negative for joint pain and myalgias.   Neurological:  Negative for dizziness, tingling and headaches.   Psychiatric/Behavioral:  The patient does not have insomnia.         Dreams when overwhelmed and anxious     Allergies[1]      Medications Ordered Prior to Encounter[2]           Objective     /70 (BP  "Location: Left arm, Patient Position: Sitting, BP Cuff Size: Adult)   Pulse 72   Temp 36.7 °C (98 °F) (Temporal)   Resp 12   Ht 1.651 m (5' 5\")   Wt 53.5 kg (118 lb)   SpO2 97%   BMI 19.64 kg/m²      Physical Exam  Vitals reviewed.   Constitutional:       General: She is not in acute distress.     Appearance: She is well-developed. She is not diaphoretic.   HENT:      Head: Normocephalic and atraumatic.      Mouth/Throat:      Pharynx: No oropharyngeal exudate.   Eyes:      General: No scleral icterus.        Right eye: No discharge.         Left eye: No discharge.      Conjunctiva/sclera: Conjunctivae normal.      Pupils: Pupils are equal, round, and reactive to light.   Cardiovascular:      Rate and Rhythm: Normal rate and regular rhythm.      Heart sounds: Normal heart sounds. No murmur heard.     No friction rub. No gallop.   Pulmonary:      Effort: Pulmonary effort is normal. No respiratory distress.      Breath sounds: Normal breath sounds. No wheezing.   Abdominal:      General: Bowel sounds are normal. There is no distension.      Palpations: Abdomen is soft. There is no hepatomegaly, splenomegaly or mass.      Tenderness: There is no abdominal tenderness.   Musculoskeletal:         General: Normal range of motion.      Cervical back: Normal range of motion and neck supple.   Skin:     General: Skin is warm and dry.      Coloration: Skin is not pale.      Findings: No erythema or rash.   Neurological:      Mental Status: She is alert and oriented to person, place, and time.   Psychiatric:         Mood and Affect: Mood normal.         Behavior: Behavior normal.        Latest Reference Range & Units 05/05/25 12:46   WBC 4.8 - 10.8 K/uL 10.0   RBC 4.20 - 5.40 M/uL 2.35 (L)   Hemoglobin 12.0 - 16.0 g/dL 7.3 (L)   Hematocrit 37.0 - 47.0 % 20.4 (L)   MCV 81.4 - 97.8 fL 86.8   MCH 27.0 - 33.0 pg 31.1   MCHC 32.2 - 35.5 g/dL 35.8 (H)   RDW 35.9 - 50.0 fL 78.4 (H)   Platelet Count 164 - 446 K/uL 490 (H)   MPV " 9.0 - 12.9 fL 10.8   Neutrophils-Polys 44.00 - 72.00 % 53.90   Neutrophils (Absolute) 1.82 - 7.42 K/uL 5.40   Lymphocytes 22.00 - 41.00 % 35.10   Lymphs (Absolute) 1.00 - 4.80 K/uL 3.51   Monocytes 0.00 - 13.40 % 6.40   Monos (Absolute) 0.00 - 0.85 K/uL 0.64   Eosinophils 0.00 - 6.90 % 3.50   Eos (Absolute) 0.00 - 0.51 K/uL 0.35   Basophils 0.00 - 1.80 % 0.70   Baso (Absolute) 0.00 - 0.12 K/uL 0.07   Immature Granulocytes 0.00 - 0.90 % 0.40   Immature Granulocytes (abs) 0.00 - 0.11 K/uL 0.04   Nucleated RBC 0.00 - 0.20 /100 WBC 4.10 (H)   NRBC (Absolute) K/uL 0.41   Plt Estimation  Normal   RBC Morphology  Present   Anisocytosis  2+ !   Macrocytosis  2+ !   Microcytosis  1+   Polychromia  1+   Poikilocytosis  3+   Ovalocytes  1+   Target Cells  1+   Sickle Cells  3+   Comments-Diff  see below   Peripheral Smear Review  see below   Sodium 135 - 145 mmol/L 138   Potassium 3.6 - 5.5 mmol/L 4.5   Chloride 96 - 112 mmol/L 104   Co2 20 - 33 mmol/L 25   Anion Gap 7.0 - 16.0  9.0   Glucose 65 - 99 mg/dL 79   Bun 8 - 22 mg/dL 4 (L)   Creatinine 0.50 - 1.40 mg/dL 0.47 (L)   GFR (CKD-EPI) >60 mL/min/1.73 m 2 131   Calcium 8.5 - 10.5 mg/dL 9.4   Correct Calcium 8.5 - 10.5 mg/dL 9.2   AST(SGOT) 12 - 45 U/L 66 (H)   ALT(SGPT) 2 - 50 U/L 16   Alkaline Phosphatase 30 - 99 U/L 47   Total Bilirubin 0.1 - 1.5 mg/dL 3.6 (H)   Albumin 3.2 - 4.9 g/dL 4.2   Total Protein 6.0 - 8.2 g/dL 7.4   Globulin 1.9 - 3.5 g/dL 3.2   A-G Ratio g/dL 1.3   Fasting Status  Unknown   (L): Data is abnormally low  (H): Data is abnormally high  !: Data is abnormal              Latest Reference Range & Units Most Recent   Reticulocyte Count 0.8 - 2.1 % 11.4 (H)  3/9/23 12:36   Retic, Absolute 0.04 - 0.06 M/uL 0.27 (H)  3/9/23 12:36   Imm. Reticulocyte Fraction 9.3 - 17.4 % 46.0 (H)  3/9/23 12:36   Retic Hgb Equivalent 29.0 - 35.0 pg/cell 31.9  3/9/23 12:36   Hgb Solubility  Positive  3/9/23 12:36   Hemoglobin A1 95.0 - 97.9 % 0.0 (L)  3/9/23 12:36    Hemoglobin A2 2.0 - 3.5 % 3.3  3/9/23 12:36   Hemoglobin F 0.0 - 2.1 % 6.0 (H)  3/9/23 12:36   Hemoglobin S 0.0 - 0.0 % 88.6 (H)  3/9/23 12:36   Hemaglobin C 0.0 - 0.0 % 0.0  3/9/23 12:36   Hemoglobin E 0.0 - 0.0 % 0.0  3/9/23 12:36   Hemoglobin Eval  Abnormal !  3/9/23 12:36   Hemoglobin Other 0.0 - 0.0 % 2.1 (H)  3/9/23 12:36   Hemoglobin,Capillary Electrophoresis  Performed  3/9/23 12:36   (H): Data is abnormally high  (L): Data is abnormally low  !: Data is abnormal      DEXA  9/23/2024 10:37 AM  HISTORY/REASON FOR EXAM:  Sickle cell, osteoporosis of the lumbar spine, osteopenia of the right femur     TECHNIQUE/EXAM DESCRIPTION AND NUMBER OF VIEWS:  Dual x-ray bone densitometry was performed from the L1 through L4 levels and from the proximal right femur utilizing the GE Prodigy unit.     COMPARISON: Bone densitometry scan 10/20/2021     FINDINGS:  The lumbar spine has a mean bone mineral density of 0.791 g/cm2, with a T score of -3.2 and a Z score of -3.6.     The proximal right femur has a mean bone mineral density of 0.827 g/cm2, with a T score of -1.4 and a Z score of -2.1.     When compared with the most recent study dated 10/20/2021, there has been a 6.3% decrease in the bone mineral density of the lumbar spine and a 3.5% decrease in the bone mineral density of the right femur.     IMPRESSION:  According to the World Health Organization classification, bone mineral density of this patient is osteoporotic for the lumbar spine and osteopenic for the right femur. Decrease in bone density in the lumbar spine since the prior exam is statistically   significant. Decrease in bone density in the right femur since the prior exam is not statistically significant.     FRAX score not obtained for this patient.           Assessment & Plan     1. 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 Encounter for hematology follow-up        3. Total bilirubin,  elevated        4. Elevated platelet count        5. Premature osteoporosis             1.  Osteoporosis: She continues with calcium w/ vitamin D and WBE as per PCP. Patient reports referral has been placed per PCP for endocrinology f/v.     2.  Elevated platelets: Improved, suspect reactive r/t anemia, continue to monitor.     3.  Elevated bilirubin: Stable, previously elevated (2023) with elevated retic, indirect and direct bili, correlating with hemolysis expected from sickle cell disease. She is not jaundiced and denies sickle cell cries and associated discomfort, continue to monitor. She is encouraged to resume Folic acid to help with cell integrity.      4.  Sickle Cell: Stable with infrequent crises, last transfusion 3/2021.     CBC and CMP have been reviewed and found to be within acceptable limits, except were addressed above. She continues to manage symptoms well with hydration and diet. She will completed labs every 6 months and return for re-evaluation in 1 year, sooner as needed. Will have her follow up with hematologist at next visit.    - retic added to surveillance labs      The patient verbalized agreement and understanding of current plan. All questions and concerns were addressed at time of visit.    Please note that this dictation was created using voice recognition software. I have made every reasonable attempt to correct obvious errors, but I expect that there are errors of grammar and possibly content that I did not discover before finalizing the note.              [1] No Known Allergies  [2]   Current Outpatient Medications on File Prior to Encounter   Medication Sig Dispense Refill    amoxicillin-clavulanate (AUGMENTIN) 875-125 MG Tab Take 1 Tablet by mouth 2 times a day for 7 days. 14 Tablet 0    benzonatate (TESSALON) 100 MG Cap Take 2 Capsules by mouth 3 times a day as needed for Cough. 60 Capsule 0    cetirizine (ZYRTEC) 10 MG Tab Take 10 mg by mouth every day.      albuterol 108 (90  Base) MCG/ACT Aero Soln inhalation aerosol Inhale 2 Puffs every 6 hours as needed.      esomeprazole (NEXIUM) 20 MG capsule Take 1 Capsule by mouth every morning before breakfast. 90 Capsule 3    etonogestrel (NEXPLANON) 68 MG Implant implant Inject 1 Each under the skin one time.       No current facility-administered medications on file prior to encounter.

## 2025-07-09 ENCOUNTER — HOSPITAL ENCOUNTER (OUTPATIENT)
Facility: MEDICAL CENTER | Age: 30
End: 2025-07-09
Attending: NURSE PRACTITIONER
Payer: COMMERCIAL

## 2025-07-09 ENCOUNTER — OFFICE VISIT (OUTPATIENT)
Dept: URGENT CARE | Facility: CLINIC | Age: 30
End: 2025-07-09
Payer: COMMERCIAL

## 2025-07-09 VITALS
DIASTOLIC BLOOD PRESSURE: 80 MMHG | TEMPERATURE: 98.4 F | BODY MASS INDEX: 19.49 KG/M2 | SYSTOLIC BLOOD PRESSURE: 110 MMHG | RESPIRATION RATE: 17 BRPM | WEIGHT: 117 LBS | HEART RATE: 79 BPM | OXYGEN SATURATION: 91 % | HEIGHT: 65 IN

## 2025-07-09 DIAGNOSIS — B37.9 YEAST INFECTION: ICD-10-CM

## 2025-07-09 DIAGNOSIS — N89.8 VAGINAL DISCHARGE: ICD-10-CM

## 2025-07-09 DIAGNOSIS — R39.9 UTI SYMPTOMS: Primary | ICD-10-CM

## 2025-07-09 DIAGNOSIS — T75.3XXA MOTION SICKNESS, INITIAL ENCOUNTER: ICD-10-CM

## 2025-07-09 LAB
APPEARANCE UR: CLEAR
BILIRUB UR STRIP-MCNC: NEGATIVE MG/DL
COLOR UR AUTO: YELLOW
GLUCOSE UR STRIP.AUTO-MCNC: NEGATIVE MG/DL
KETONES UR STRIP.AUTO-MCNC: NEGATIVE MG/DL
LEUKOCYTE ESTERASE UR QL STRIP.AUTO: NORMAL
NITRITE UR QL STRIP.AUTO: NEGATIVE
PH UR STRIP.AUTO: 7 [PH] (ref 5–8)
PROT UR QL STRIP: NEGATIVE MG/DL
RBC UR QL AUTO: NEGATIVE
SP GR UR STRIP.AUTO: 1.01
UROBILINOGEN UR STRIP-MCNC: >=8 MG/DL

## 2025-07-09 PROCEDURE — 87660 TRICHOMONAS VAGIN DIR PROBE: CPT

## 2025-07-09 PROCEDURE — 81002 URINALYSIS NONAUTO W/O SCOPE: CPT | Performed by: NURSE PRACTITIONER

## 2025-07-09 PROCEDURE — 87480 CANDIDA DNA DIR PROBE: CPT

## 2025-07-09 PROCEDURE — 87563 M. GENITALIUM AMP PROBE: CPT

## 2025-07-09 PROCEDURE — 87798 DETECT AGENT NOS DNA AMP: CPT | Mod: 91

## 2025-07-09 PROCEDURE — 87591 N.GONORRHOEAE DNA AMP PROB: CPT

## 2025-07-09 PROCEDURE — 87491 CHLMYD TRACH DNA AMP PROBE: CPT

## 2025-07-09 PROCEDURE — 99214 OFFICE O/P EST MOD 30 MIN: CPT | Performed by: NURSE PRACTITIONER

## 2025-07-09 PROCEDURE — 3079F DIAST BP 80-89 MM HG: CPT | Performed by: NURSE PRACTITIONER

## 2025-07-09 PROCEDURE — 87510 GARDNER VAG DNA DIR PROBE: CPT

## 2025-07-09 PROCEDURE — 3074F SYST BP LT 130 MM HG: CPT | Performed by: NURSE PRACTITIONER

## 2025-07-09 RX ORDER — ONDANSETRON 4 MG/1
4 TABLET, ORALLY DISINTEGRATING ORAL EVERY 8 HOURS PRN
Qty: 10 TABLET | Refills: 0 | Status: SHIPPED | OUTPATIENT
Start: 2025-07-09

## 2025-07-09 RX ORDER — NITROFURANTOIN 25; 75 MG/1; MG/1
100 CAPSULE ORAL 2 TIMES DAILY
Qty: 10 CAPSULE | Refills: 0 | Status: SHIPPED | OUTPATIENT
Start: 2025-07-09 | End: 2025-07-14

## 2025-07-09 RX ORDER — SCOPOLAMINE 1 MG/3D
1 PATCH, EXTENDED RELEASE TRANSDERMAL
Qty: 4 PATCH | Refills: 3 | Status: SHIPPED | OUTPATIENT
Start: 2025-07-09

## 2025-07-09 ASSESSMENT — FIBROSIS 4 INDEX: FIB4 SCORE: 1.01

## 2025-07-10 LAB
C TRACH DNA GENITAL QL NAA+PROBE: NEGATIVE
CANDIDA DNA VAG QL PROBE+SIG AMP: POSITIVE
G VAGINALIS DNA VAG QL PROBE+SIG AMP: NEGATIVE
N GONORRHOEA DNA GENITAL QL NAA+PROBE: NEGATIVE
SPECIMEN SOURCE: NORMAL
T VAGINALIS DNA VAG QL PROBE+SIG AMP: NEGATIVE

## 2025-07-10 RX ORDER — FLUCONAZOLE 150 MG/1
150 TABLET ORAL DAILY
Qty: 2 TABLET | Refills: 0 | Status: SHIPPED | OUTPATIENT
Start: 2025-07-10

## 2025-07-10 ASSESSMENT — ENCOUNTER SYMPTOMS
CHILLS: 0
FLANK PAIN: 0

## 2025-07-10 NOTE — PROGRESS NOTES
Subjective:   Alicia Tejada is a 30 y.o. female who presents for Other (Urination discomfort/pain x1 day)      Dysuria   This is a new problem. The current episode started yesterday. The problem occurs every urination. The problem has been unchanged. The quality of the pain is described as burning. The pain is mild. She is Not sexually active. There is No history of pyelonephritis. Associated symptoms include a discharge and frequency. Pertinent negatives include no chills, flank pain, hematuria, possible pregnancy or urgency. She has tried nothing for the symptoms. The treatment provided no relief. There is no history of kidney stones or recurrent UTIs.       Review of Systems   Constitutional:  Negative for chills.   Genitourinary:  Positive for dysuria and frequency. Negative for flank pain, hematuria and urgency.        Itching     Musculoskeletal:  Negative for joint pain.       Medications:    albuterol Aers  benzonatate Caps  cetirizine Tabs  esomeprazole  fluconazole  Nexplanon Impl  nitrofurantoin Caps  ondansetron Tbdp  scopolamine Pt72    Allergies: Patient has no known allergies.    Problem List: Alicia Tejada does not have any pertinent problems on file.    Surgical History:  Past Surgical History:   Procedure Laterality Date    ANUP BY LAPAROSCOPY  1/15/2019    Procedure: laparoscopic cholecystectomy;  Surgeon: Noel Greene M.D.;  Location: SURGERY [facility];  Service: General    TONSILLECTOMY         Past Social Hx: Alicia Tejada  reports that she has never smoked. She has never used smokeless tobacco. She reports current alcohol use of about 1.2 oz of alcohol per week. She reports that she does not currently use drugs after having used the following drugs: Marijuana.     Past Family Hx:  Alicia Tejada family history includes Breast Cancer in her paternal aunt; Diabetes in her maternal grandmother and paternal grandmother; GI Disease in her brother;  "Hypertension in her maternal grandmother and paternal grandmother; No Known Problems in her brother, brother, father, and mother; Other in her maternal uncle.     Problem list, medications, and allergies reviewed by myself today in Epic.     Objective:     /80   Pulse 79   Temp 36.9 °C (98.4 °F) (Temporal)   Resp 17   Ht 1.638 m (5' 4.5\")   Wt 53.1 kg (117 lb)   SpO2 91%   BMI 19.77 kg/m²     Physical Exam  Constitutional:       Appearance: Normal appearance. She is not ill-appearing or toxic-appearing.   HENT:      Head: Normocephalic.      Right Ear: External ear normal.      Left Ear: External ear normal.      Nose: Nose normal.      Mouth/Throat:      Lips: Pink.   Eyes:      General: Lids are normal.   Pulmonary:      Effort: Pulmonary effort is normal. No accessory muscle usage.   Abdominal:      Tenderness: There is no right CVA tenderness or left CVA tenderness.   Musculoskeletal:      Cervical back: Full passive range of motion without pain.   Neurological:      Mental Status: She is alert and oriented to person, place, and time.   Psychiatric:         Mood and Affect: Mood normal.         Thought Content: Thought content normal.         Assessment/Plan:     Diagnosis and associated orders:     1. UTI symptoms  POCT Urinalysis    nitrofurantoin (MACROBID) 100 MG Cap      2. Vaginal discharge  VAGINAL PATHOGENS DNA PANEL    Chlamydia/GC, PCR (Genital/Anal swab)    UROGENITAL UREAPLASMA/MYCOPLASMA, PCR      3. Motion sickness, initial encounter  ondansetron (ZOFRAN ODT) 4 MG TABLET DISPERSIBLE    scopolamine (TRANSDERM-SCOP) 1 MG/3DAYS PATCH 72 HR      4. Yeast infection  fluconazole (DIFLUCAN) 150 MG tablet         Comments/MDM:       Results for orders placed or performed in visit on 07/09/25   POCT Urinalysis    Collection Time: 07/09/25  7:43 PM   Result Value Ref Range    POC Color yellow Negative    POC Appearance clear Negative    POC Glucose negative Negative mg/dL    POC Bilirubin " negative Negative mg/dL    POC Ketones negative Negative mg/dL    POC Specific Gravity 1.010 <1.005 - >1.030    POC Blood negative Negative    POC Urine PH 7.0 5.0 - 8.0    POC Protein negative Negative mg/dL    POC Urobiligen >=8.0 Negative (0.2) mg/dL    POC Nitrites negative Negative    POC Leukocyte Esterase trace Negative       I personally reviewed prior external notes and prior test results pertinent to today's visit.   Will treat for suspected UTI follow-up with pending labs changes indicated.  Patient also requesting oral antiemetic for motion sickness as she is going to be on a cruise next week.  Rx medication provided discussed management options, risks and benefits, and alternatives to treatment plan agreed upon.   Red flags discussed and indications to immediately call 911 or present to the Emergency Department.   Supportive care, differential diagnoses, and indications for immediate follow-up discussed with patient.    Patient expresses understanding and agrees to plan. Patient denies any other questions or concerns.                    Please note that this dictation was created using voice recognition software. I have made a reasonable attempt to correct obvious errors, but I expect that there are errors of grammar and possibly content that I did not discover before finalizing the note.    This note was electronically signed by Marvin MOON.

## 2025-07-18 LAB
M GENITALIUM DNA SPEC QL NAA+PROBE: NOT DETECTED
M HOMINIS DNA SPEC QL NAA+PROBE: DETECTED
SPECIMEN SOURCE: ABNORMAL
U PARVUM DNA SPEC QL NAA+PROBE: NOT DETECTED
U UREALYTICUM DNA SPEC QL NAA+PROBE: DETECTED

## 2025-07-19 ENCOUNTER — TELEPHONE (OUTPATIENT)
Dept: URGENT CARE | Facility: CLINIC | Age: 30
End: 2025-07-19
Payer: COMMERCIAL

## 2025-07-19 DIAGNOSIS — A49.3 MYCOPLASMA INFECTION: Primary | ICD-10-CM

## 2025-07-19 RX ORDER — DOXYCYCLINE HYCLATE 100 MG
100 TABLET ORAL 2 TIMES DAILY
Qty: 14 TABLET | Refills: 0 | Status: SHIPPED | OUTPATIENT
Start: 2025-07-19 | End: 2025-07-26

## (undated) DEVICE — GOWN SURGEONS X-LARGE - DISP. (30/CA)

## (undated) DEVICE — TUBING CLEARLINK DUO-VENT - C-FLO (48EA/CA)

## (undated) DEVICE — SODIUM CHL IRRIGATION 0.9% 1000ML (12EA/CA)

## (undated) DEVICE — ELECTRODE DUAL RETURN W/ CORD - (50/PK)

## (undated) DEVICE — ELECTRODE 850 FOAM ADHESIVE - HYDROGEL RADIOTRNSPRNT (50/PK)

## (undated) DEVICE — DERMABOND ADVANCED - (12EA/BX)

## (undated) DEVICE — KIT ROOM DECONTAMINATION

## (undated) DEVICE — GLOVE BIOGEL SZ 7 SURGICAL PF LTX - (50PR/BX 4BX/CA)

## (undated) DEVICE — CLIP MED LG INTNL HRZN TI ESCP - (20/BX)

## (undated) DEVICE — DRAPESURG STERI-DRAPE LONG - (10/BX 4BX/CA)

## (undated) DEVICE — SUTURE 0 COATED VICRYL 6-18IN - (12PK/BX)

## (undated) DEVICE — MASK ANESTHESIA ADULT  - (100/CA)

## (undated) DEVICE — TROCAR Z THREAD12MM OPTICAL - NON BLADED (6/BX)

## (undated) DEVICE — SET LEADWIRE 5 LEAD BEDSIDE DISPOSABLE ECG (1SET OF 5/EA)

## (undated) DEVICE — TUBE CONNECT SUCTION CLEAR 120 X 1/4" (50EA/CA)"

## (undated) DEVICE — SENSOR SPO2 NEO LNCS ADHESIVE (20/BX) SEE USER NOTES

## (undated) DEVICE — SUTURE 4-0 MONOCRYL PLUS PS-2 - 27 INCH (36/BX)

## (undated) DEVICE — SET SUCTION/IRRIGATION WITH DISPOSABLE TIP (6/CA )PART #0250-070-520 IS A SUB

## (undated) DEVICE — DRAPE C-ARM LARGE 41IN X 74 IN - (10/BX 2BX/CA)

## (undated) DEVICE — CHLORAPREP 26 ML APPLICATOR - ORANGE TINT(25/CA)

## (undated) DEVICE — SUTURE GENERAL

## (undated) DEVICE — GLOVE BIOGEL SZ 8 SURGICAL PF LTX - (50PR/BX 4BX/CA)

## (undated) DEVICE — BLADE SURGICAL #15 - (50/BX 3BX/CA)

## (undated) DEVICE — PROTECTOR ULNA NERVE - (36PR/CA)

## (undated) DEVICE — CANISTER SUCTION 3000ML MECHANICAL FILTER AUTO SHUTOFF MEDI-VAC NONSTERILE LF DISP  (40EA/CA)

## (undated) DEVICE — SCISSORS 5MM CVD (6EA/BX)

## (undated) DEVICE — SET EXTENSION WITH 2 PORTS (48EA/CA) ***PART #2C8610 IS A SUBSTITUTE*****

## (undated) DEVICE — CANNULA W/SEAL 5X100 Z-THRE - ADED KII (12/BX)

## (undated) DEVICE — BAG RETRIEVAL 10ML (10EA/BX)

## (undated) DEVICE — NEPTUNE 4 PORT MANIFOLD - (20/PK)

## (undated) DEVICE — TUBE E-T HI-LO CUFF 7.0MM (10EA/PK)

## (undated) DEVICE — PACK LAP CHOLE OR - (2EA/CA)

## (undated) DEVICE — SUCTION INSTRUMENT YANKAUER BULBOUS TIP W/O VENT (50EA/CA)

## (undated) DEVICE — HEAD HOLDER JUNIOR/ADULT

## (undated) DEVICE — TROCAR 5X100 NON BLADED Z-TH - READ KII (6/BX)

## (undated) DEVICE — LACTATED RINGERS INJ 1000 ML - (14EA/CA 60CA/PF)

## (undated) DEVICE — KIT ANESTHESIA W/CIRCUIT & 3/LT BAG W/FILTER (20EA/CA)